# Patient Record
Sex: FEMALE | Race: WHITE | NOT HISPANIC OR LATINO | Employment: OTHER | URBAN - METROPOLITAN AREA
[De-identification: names, ages, dates, MRNs, and addresses within clinical notes are randomized per-mention and may not be internally consistent; named-entity substitution may affect disease eponyms.]

---

## 2017-01-09 ENCOUNTER — OFFICE VISIT (OUTPATIENT)
Dept: FAMILY MEDICINE | Facility: CLINIC | Age: 82
End: 2017-01-09

## 2017-01-09 VITALS
HEART RATE: 59 BPM | HEIGHT: 62 IN | OXYGEN SATURATION: 97 % | SYSTOLIC BLOOD PRESSURE: 120 MMHG | TEMPERATURE: 98.1 F | DIASTOLIC BLOOD PRESSURE: 74 MMHG | WEIGHT: 185 LBS | BODY MASS INDEX: 34.04 KG/M2

## 2017-01-09 DIAGNOSIS — Z01.818 PREOPERATIVE EXAMINATION: Primary | ICD-10-CM

## 2017-01-09 DIAGNOSIS — E11.40 TYPE 2 DIABETES MELLITUS WITH DIABETIC NEUROPATHY, WITHOUT LONG-TERM CURRENT USE OF INSULIN (H): ICD-10-CM

## 2017-01-09 DIAGNOSIS — E53.9 B-COMPLEX DEFICIENCY: ICD-10-CM

## 2017-01-09 DIAGNOSIS — E78.2 MIXED HYPERLIPIDEMIA: ICD-10-CM

## 2017-01-09 LAB
HBA1C MFR BLD: 7.2 % (ref 4–7)
HEMOGLOBIN: 11.3 G/DL (ref 11.7–15.7)

## 2017-01-09 PROCEDURE — 85018 HEMOGLOBIN: CPT | Performed by: FAMILY MEDICINE

## 2017-01-09 PROCEDURE — 99214 OFFICE O/P EST MOD 30 MIN: CPT | Performed by: FAMILY MEDICINE

## 2017-01-09 PROCEDURE — 83036 HEMOGLOBIN GLYCOSYLATED A1C: CPT | Performed by: FAMILY MEDICINE

## 2017-01-09 PROCEDURE — 36415 COLL VENOUS BLD VENIPUNCTURE: CPT | Performed by: FAMILY MEDICINE

## 2017-01-09 PROCEDURE — 93000 ELECTROCARDIOGRAM COMPLETE: CPT | Performed by: FAMILY MEDICINE

## 2017-01-09 NOTE — PROGRESS NOTES
[facility]  [address]  [address]  [facility] [address]  [phone]  Dept: [phone]    PRE-OP EVALUATION:  Today's date: 2017    Trisha Lucia (: 1935) presents for pre-operative evaluation assessment as requested by Dr. Joaquin Skaggs.  She requires evaluation and anesthesia risk assessment prior to undergoing surgery/procedure for treatment of Hernia .  Proposed procedure:ventral Hernia Repair    Date of Surgery/ Procedure: 17  Time of Surgery/ Procedure: 7:45Am  Hospital/Surgical Facility: [facility]  Primary Physician: Judith Walker  Type of Anesthesia Anticipated: General    Patient has a Health Care Directive or Living Will:  YES On File  Noelle Guerrero CMA    1. NO - Do you have a history of heart attack, stroke, stent, bypass or surgery on an artery in the head, neck, heart or legs?  2. NO - Do you ever have any pain or discomfort in your chest?  3. NO - Do you have a history of  Heart Failure?  4. NO - Are you troubled by shortness of breath when: walking on the level, up a slight hill or at night?  5. NO - Do you currently have a cold, bronchitis or other respiratory infection?  6. NO - Do you have a cough, shortness of breath or wheezing?  7. NO - Do you sometimes get pains in the calves of your legs when you walk?  8. NO - Do you or anyone in your family have previous history of blood clots?  9. NO - Do you or does anyone in your family have a serious bleeding problem such as prolonged bleeding following surgeries or cuts?  10. NO - Have you ever had problems with anemia or been told to take iron pills?  11. NO - Have you had any abnormal blood loss such as black, tarry or bloody stools, or abnormal vaginal bleeding?  12. NO - Have you ever had a blood transfusion?  13. NO - Have you or any of your relatives ever had problems with anesthesia?  14. YES - DO YOU HAVE SLEEP APNEA, EXCESSIVE SNORING OR DAYTIME DROWSINESS? Has CPAP  but doesn't use  15. NO - Do you have any prosthetic heart valves?  16. YES - DO YOU HAVE PROSTHETIC JOINTS? knee  17. NO - Is there any chance that you may be pregnant?      HPI:                                                      Brief HPI related to upcoming procedure: ventral hernia      See problem list for active medical problems.  Problems all longstanding and stable, except as noted/documented.  See ROS for pertinent symptoms related to these conditions.                                                                                                  .  DIABETES - Patient has a longstanding history of DiabetesType Type II . Patient is being treated with oral agents and denies significant side effects. Control has been good. Complicating factors include but are not limited to: high cholesterol  and obesity .                                                                                                             .    MEDICAL HISTORY:                                                      Patient Active Problem List    Diagnosis Date Noted     Ventral hernia 07/20/2016     Priority: Medium     Pernicious anemia 05/09/2016     Priority: Medium     Type 2 diabetes mellitus with diabetic neuropathy (H) 02/01/2016     Priority: Medium     Neuropathic pain 02/24/2015     Priority: Medium     Followed by neurology       ACP (advance care planning) 07/15/2014     Priority: Medium     Advance Care Planning 7/6/2016: Receipt of ACP document:  Received: Health Care Directive which was witnessed or notarized on 1/18/11.  Document previously scanned on 4/12/16.  Validation form completed and sent to be scanned.  Code Status needs to be updated to reflect choices in most recent ACP document. A conversation about goals of care is recommended for this patient with creation of POLST if appropriate. Confirmed/documented designated decision maker(s).  Added by Josie Holman   Advance Care Planning Liaison  Advance Care  Planning 7/15/2014 : ACP Review and Resources Provided:  Reviewed chart for advance care plan.  Trisha COPE Rea has no plan or code status on file. Discussed available resources and provided with information. Confirmed code status reflects current choices pending further ACP discussions.  Confirmed/documented designated decision maker(s). See permanent comments section of demographics in clinical tab. Added by Destiny Hardy               Open wound of foot, excluding toe(s) 04/03/2013     Priority: Medium     Problem list name updated by automated process. 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 Provider to review       Health Care Home 09/13/2012     Priority: Medium     State Tier Level:  Tier 2  Status:  n/a  Care Coordinator:    See Letters for [facility] Care Plan           History of small bowel obstruction 06/27/2011     Priority: Medium     6/16/2011  She had a CT of the abdomen in the emergency room which was suggestive of a small-bowel obstruction. Her lactic acid level was elevated. Surgery was consulted. The patient was put on IV fluids and pain medications. After surgical evaluation, she underwent surgery and had a laparotomy with lysis of adhesive band and small bowel resection with primary enteroenterostomy and appendectomy. During her hospital course, the patient had a good recovery.       ANNA (obstructive sleep apnea) 12/27/2010     Priority: Medium     cpap       Corn of toe 05/30/2008     Priority: Medium     Cellulitis and abscess of foot 05/30/2008     Priority: Medium     Mixed hyperlipidemia 02/01/2006     Priority: Medium     Obesity 09/28/2004     Priority: Medium     Problem list name updated by automated process. Provider to review       Irritable bowel syndrome 09/28/2004     Priority: Medium     Mucous polyp of cervix 09/28/2004      Priority: Medium     Essential hypertension, benign 06/19/2003     Priority: Medium     Other specified idiopathic peripheral neuropathy 06/19/2003     Priority: Medium     Hypothyroidism 06/19/2003     Priority: Medium     Problem list name updated by automated process. Provider to review       Osteoarthrosis, unspecified whether generalized or localized, involving lower leg 06/19/2003     Priority: Medium     Problem list name updated by automated process. Provider to review        Past Medical History   Diagnosis Date     Essential hypertension, benign      Other specified idiopathic peripheral neuropathy      Unspecified hypothyroidism      Other abnormal glucose      DIABETES MELLITUS TYPE II-UNCOMPL 10/24/2007     Sleep apnea      Past Surgical History   Procedure Laterality Date     Hc debridment mastoid cavity, simple       L ear     Hc knee scope, diagnostic  1997     Arthroscopy, Knee     Hc knee scope, diagnostic  2001     Arthroscopy, Knee     C eye exam established pt  2007     C mammogram, screening  2005     Hcl pap smear  2005     Hc colonoscopy thru stoma, diagnostic  2001     C dexa, bone density, axial skel  2005     Normal     Knee surgery  2011     right total-Dr. Gorman     Small bowel obstruction  6/2011     Small-bowel obstruction, status post laparotomy with lysis of adhesions, small bowel resection with primary enteroenterostomy and appendectomy     Bunionectomy  4/9/2013     Procedure: BUNIONECTOMY;  RIGHT GREAT CLAWTOE CORRECTION WITH TENDON TRANSFER, ENDOSCOPIC RECESSION OF GASTRONEMIUS, DEBRIDEMENT OF ULCER ON SOLE OF RIGHT FOOT;  Surgeon: Stephon Rae MD;  Location: [facility]     Irrigation and debridement foot, combined  4/9/2013     Procedure: COMBINED IRRIGATION AND DEBRIDEMENT FOOT;  Debridement of sole ulcer right foot;  Surgeon: Stephon Rae MD;  Location: [facility]     Endoscopic recession gastrocnemius (yvonne)  4/9/2013     Procedure: ENDOSCOPIC RECESSION  GASTROCNEMIUS (DONTA);;  Surgeon: Stephon Rae MD;  Location: [facility] repair of gracia,one  2015     Butch     Current Outpatient Prescriptions   Medication Sig Dispense Refill     simvastatin (ZOCOR) 20 MG tablet TAKE 1 TABLET (20 MG) BY MOUTH AT BEDTIME 90 tablet 1     levothyroxine (SYNTHROID, LEVOTHROID) 150 MCG tablet Take 1 tablet (150 mcg) by mouth daily 90 tablet 1     lisinopril (PRINIVIL,ZESTRIL) 20 MG tablet Take 1 tablet (20 mg) by mouth daily 90 tablet 1     metFORMIN (GLUCOPHAGE) 1000 MG tablet TAKE 1 TABLET (1,000 MG) BY MOUTH 2 TIMES DAILY (WITH MEALS) 180 tablet 1     latanoprost (XALATAN) 0.005 % ophthalmic solution   4     gabapentin (NEURONTIN) 300 MG capsule        hyoscyamine (ANASPAZ,LEVSIN) 0.125 MG tablet Take 1 tablet (125 mcg) by mouth 4 times daily (before meals and nightly) 30 tablet 2     Multiple Vitamins-Minerals (SENIOR MULTIVITAMIN PLUS) TABS Take  by mouth.       citalopram (CELEXA) 20 MG tablet Take 20 mg by mouth daily.       aspirin 81 MG EC tablet Take 1 tablet by mouth daily. 90 tablet 3     pregabalin (LYRICA) 150 MG capsule Take 150 mg by mouth 3 times daily.       OXYCONTIN 10 MG 12 hr tablet   0     HYDROcodone-acetaminophen (NORCO) 5-325 MG per tablet 1 tablet daily       OTC products: None, except as noted above    Allergies   Allergen Reactions     Cephalexin Other (See Comments) and Itching     Redness and peeling skin     Penicillins      Childhood rxn of red dots     Percocet [Oxycodone-Acetaminophen]      hallucinations     Shellfish Allergy      Sulfa Drugs       Latex Allergy: NO    Social History   Substance Use Topics     Smoking status: Never Smoker      Smokeless tobacco: Never Used     Alcohol Use: No      Comment: 0     History   Drug Use No       REVIEW OF SYSTEMS:                                                    C: NEGATIVE for fever, chills, change in weight  E/M: NEGATIVE for ear, mouth and throat problems  R: NEGATIVE for  "significant cough or SOB  CV: NEGATIVE for chest pain, palpitations or peripheral edema  : negative for and dysuria  PSYCHIATRIC: NEGATIVE for changes in mood or affect    EXAM:                                                    /74 mmHg  Pulse 59  Temp(Src) 98.1  F (36.7  C) (Oral)  Ht 1.572 m (5' 1.9\")  Wt 83.915 kg (185 lb)  BMI 33.96 kg/m2  SpO2 97%  GENERAL APPEARANCE: healthy, alert and no distress  HENT: ear canals and TM's normal and nose and mouth without ulcers or lesions  RESP: lungs clear to auscultation - no rales, rhonchi or wheezes  CV: regular rate and rhythm, normal S1 S2, no S3 or S4 and no murmur, click or rub   ABDOMEN: soft, nontender, no HSM or masses and bowel sounds normal  NEURO: Normal strength and tone, sensory exam grossly normal, mentation intact and speech normal  PSYCH: mentation appears normal and affect normal/bright    DIAGNOSTICS:                                                      EKG: sinus bradycardia, normal axis, normal intervals, no acute ST/T changes c/w ischemia, no LVH by voltage criteria, unchanged from previous tracings-do not agree with computer read of inferior Q waves  Labs Resulted Today:   Results for orders placed or performed in visit on 01/09/17   CL AFF HEMOGLOBIN (BFP)   Result Value Ref Range    Hemoglobin 11.3 (A) 11.7 - 15.7 g/dL    Narrative    Ran twice to confirm.   Hemoglobin A1c (BFP)   Result Value Ref Range    Hemoglobin A1C 7.2 (A) 4.0 - 7.0 %       Recent Labs   Lab Test  07/20/16   1343  07/20/16   1334  02/01/16   1000  02/01/16   0953   04/06/15   1627   03/20/13   0948   01/19/13   0950   04/21/11   0645  04/20/11   0635   HGB   --    --    --    --    --   12.1   --   12.1   --   12.0   < >   --   9.0*   PLT   --    --    --    --    --    --    --   183   --   238   < >   --    --    INR   --    --    --    --    --    --    --    --    --    --    --   1.51*  1.33*   NA  140   --    --   139   < >   --    < >   --    < >   --   "  < >   --    --    POTASSIUM  4.3   --    --   4.1   < >   --    < >   --    < >   --    < >   --    --    CR  0.61   --    --   0.61   < >   --    < >   --    < >   --    < >  0.64   --    A1C   --   7.1*  6.9   --    < >   --    < >   --    --    --    < >   --    --     < > = values in this interval not displayed.        IMPRESSION:                                                    Reason for surgery/procedure: ventral hernia    The proposed surgical procedure is considered INTERMEDIATE risk.    REVISED CARDIAC RISK INDEX  The patient has the following serious cardiovascular risks for perioperative complications such as (MI, PE, VFib and 3  AV Block):  No serious cardiac risks  INTERPRETATION: 0 risks: Class I (very low risk - 0.4% complication rate)    The patient has the following additional risks for perioperative complications:  No identified additional risks      ICD-10-CM    1. Preoperative examination Z01.818 EKG 12-lead complete w/read - Clinics     CL AFF HEMOGLOBIN (BFP)     Hemoglobin A1c (BFP)     VENOUS COLLECTION   2. Type 2 diabetes mellitus with diabetic neuropathy, without long-term current use of insulin (H) E11.40 EKG 12-lead complete w/read - Clinics     Hemoglobin A1c (BFP)     VENOUS COLLECTION   3. Mixed hyperlipidemia E78.2 EKG 12-lead complete w/read - Clinics       RECOMMENDATIONS:                                                        Obstructive Sleep Apnea (or suspected sleep apnea)  Hospital staff are advised to monitor for sleep related oxygen desaturations due to suspicion of ANNA      --Patient is to take all scheduled medications on the day of surgery EXCEPT for modifications listed below.    Diabetes Medication Use  -----Hold usual  oral diabetic meds (e.g. Metformin, Actos, Glipizide) while NPO.       Anticoagulant or Antiplatelet Medication Use  ASPIRIN: Discontinue ASA 7-10 days prior to procedure to reduce bleeding risk.  It should be resumed post-operatively.         APPROVAL GIVEN to proceed with proposed procedure, without further diagnostic evaluation Pt may take pain meds day of surgery but avoid metformin and lisinopril       Signed Electronically by: Berhane Stevens MD    Copy of this evaluation report is provided to requesting physician.    [facility] Preop Guidelines

## 2017-01-09 NOTE — NURSING NOTE
The following medication was given:     MEDICATION: Vitamin B12  1000mcg  ROUTE: IM  SITE: Arm - Right  DOSE: 1ML  LOT #: [id number]  :  [employer] [place] [employer]  EXPIRATION DATE:  05/01/2018  NDC#: 7660-5702-78    Noelle Guerrero CMA

## 2017-01-10 DIAGNOSIS — E03.9 HYPOTHYROIDISM: ICD-10-CM

## 2017-01-10 DIAGNOSIS — I10 ESSENTIAL HYPERTENSION, BENIGN: Primary | ICD-10-CM

## 2017-01-10 DIAGNOSIS — E03.9 HYPOTHYROIDISM, UNSPECIFIED TYPE: ICD-10-CM

## 2017-01-11 RX ORDER — LISINOPRIL 20 MG/1
TABLET ORAL
Qty: 30 TABLET | Refills: 0 | Status: SHIPPED | OUTPATIENT
Start: 2017-01-11 | End: 2017-02-16

## 2017-01-11 RX ORDER — LEVOTHYROXINE SODIUM 150 UG/1
TABLET ORAL
Qty: 30 TABLET | Refills: 0 | Status: SHIPPED | OUTPATIENT
Start: 2017-01-11 | End: 2017-06-09

## 2017-01-11 NOTE — TELEPHONE ENCOUNTER
Spoke with patient, informed meds were sent, pt is having surgery 1/13 and unsure if she will feel well enough to come in within 30 days.  She will call back to schedule visit for med check once she is recovered from surgery.

## 2017-01-11 NOTE — TELEPHONE ENCOUNTER
Pt is due for 6 mo F/U but insurance wants #[phone]-I will call and let her know she is due for an appt. Last visit was 7/20/16    Pending Prescriptions:                       Disp   Refills    levothyroxine (SYNTHROID/LEVOTHROID) 150 *90 tab*0            Sig: TAKE ONE TABLET BY MOUTH DAILY    lisinopril (PRINIVIL/ZESTRIL) 20 MG table*90 tab*0            Sig: TAKE ONE TABLET BY MOUTH ONE TIME DAILY

## 2017-01-11 NOTE — TELEPHONE ENCOUNTER
JCC, the patient is due for an office visit. I have changed the quantity to #30 and put in a note that the patient is due for an ov.    Pending Prescriptions:                       Disp   Refills    levothyroxine (SYNTHROID/LEVOTHROID) 150 *30 tab*0            Sig: TAKE ONE TABLET BY MOUTH DAILY    lisinopril (PRINIVIL/ZESTRIL) 20 MG table*30 tab*0            Sig: TAKE ONE TABLET BY MOUTH ONE TIME DAILY       Ready to be faxed when Rx is approved.    Please forward to the  so the can call the patient and help them set up an appointment.      Telephone Information:   Mobile [phone]         Thank-You,  Edie

## 2017-01-11 NOTE — TELEPHONE ENCOUNTER
Please let the patient know that a 30 day refill has been sent for their prescription.  They are due for a return fasting office visit within 30 days.  Failure to schedule an appointment may result in no further refills of his/her medication.

## 2017-01-13 ENCOUNTER — APPOINTMENT (OUTPATIENT)
Dept: SURGERY | Facility: PHYSICIAN GROUP | Age: 82
End: 2017-01-13
Payer: COMMERCIAL

## 2017-01-13 ENCOUNTER — ANESTHESIA (OUTPATIENT)
Dept: SURGERY | Facility: CLINIC | Age: 82
End: 2017-01-13
Payer: MEDICARE

## 2017-01-13 ENCOUNTER — ANESTHESIA EVENT (OUTPATIENT)
Dept: SURGERY | Facility: CLINIC | Age: 82
End: 2017-01-13
Payer: MEDICARE

## 2017-01-13 PROBLEM — K43.2 INCISIONAL HERNIA: Status: ACTIVE | Noted: 2017-01-13

## 2017-01-13 PROCEDURE — 25800025 ZZH RX 258: Performed by: ANESTHESIOLOGY

## 2017-01-13 PROCEDURE — 49560 ZZHC REPAIR INCISIONAL HERNIA,REDUCIBLE: CPT | Performed by: SURGERY

## 2017-01-13 PROCEDURE — 25000125 ZZHC RX 250: Performed by: NURSE ANESTHETIST, CERTIFIED REGISTERED

## 2017-01-13 PROCEDURE — 49568 ZZHC REPAIR HERNIA WITH MESH: CPT | Performed by: SURGERY

## 2017-01-13 RX ORDER — PROPOFOL 10 MG/ML
INJECTION, EMULSION INTRAVENOUS PRN
Status: DISCONTINUED | OUTPATIENT
Start: 2017-01-13 | End: 2017-01-13

## 2017-01-13 RX ORDER — GLYCOPYRROLATE 0.2 MG/ML
INJECTION, SOLUTION INTRAMUSCULAR; INTRAVENOUS PRN
Status: DISCONTINUED | OUTPATIENT
Start: 2017-01-13 | End: 2017-01-13

## 2017-01-13 RX ORDER — FENTANYL CITRATE 50 UG/ML
INJECTION, SOLUTION INTRAMUSCULAR; INTRAVENOUS PRN
Status: DISCONTINUED | OUTPATIENT
Start: 2017-01-13 | End: 2017-01-13

## 2017-01-13 RX ORDER — EPHEDRINE SULFATE 50 MG/ML
INJECTION, SOLUTION INTRAVENOUS PRN
Status: DISCONTINUED | OUTPATIENT
Start: 2017-01-13 | End: 2017-01-13

## 2017-01-13 RX ORDER — DEXAMETHASONE SODIUM PHOSPHATE 4 MG/ML
INJECTION, SOLUTION INTRA-ARTICULAR; INTRALESIONAL; INTRAMUSCULAR; INTRAVENOUS; SOFT TISSUE PRN
Status: DISCONTINUED | OUTPATIENT
Start: 2017-01-13 | End: 2017-01-13

## 2017-01-13 RX ORDER — ONDANSETRON 2 MG/ML
INJECTION INTRAMUSCULAR; INTRAVENOUS PRN
Status: DISCONTINUED | OUTPATIENT
Start: 2017-01-13 | End: 2017-01-13

## 2017-01-13 RX ORDER — LIDOCAINE HYDROCHLORIDE 10 MG/ML
INJECTION, SOLUTION INFILTRATION; PERINEURAL PRN
Status: DISCONTINUED | OUTPATIENT
Start: 2017-01-13 | End: 2017-01-13

## 2017-01-13 RX ADMIN — FENTANYL CITRATE 25 MCG: 50 INJECTION, SOLUTION INTRAMUSCULAR; INTRAVENOUS at 09:08

## 2017-01-13 RX ADMIN — SODIUM CHLORIDE, POTASSIUM CHLORIDE, SODIUM LACTATE AND CALCIUM CHLORIDE: 600; 310; 30; 20 INJECTION, SOLUTION INTRAVENOUS at 07:18

## 2017-01-13 RX ADMIN — FENTANYL CITRATE 100 MCG: 50 INJECTION, SOLUTION INTRAMUSCULAR; INTRAVENOUS at 07:54

## 2017-01-13 RX ADMIN — EPHEDRINE SULFATE 5 MG: 50 INJECTION, SOLUTION INTRAMUSCULAR; INTRAVENOUS; SUBCUTANEOUS at 08:06

## 2017-01-13 RX ADMIN — ONDANSETRON 4 MG: 2 INJECTION INTRAMUSCULAR; INTRAVENOUS at 09:08

## 2017-01-13 RX ADMIN — SODIUM CHLORIDE, POTASSIUM CHLORIDE, SODIUM LACTATE AND CALCIUM CHLORIDE: 600; 310; 30; 20 INJECTION, SOLUTION INTRAVENOUS at 08:52

## 2017-01-13 RX ADMIN — DEXAMETHASONE SODIUM PHOSPHATE 4 MG: 4 INJECTION, SOLUTION INTRAMUSCULAR; INTRAVENOUS at 07:54

## 2017-01-13 RX ADMIN — FENTANYL CITRATE 25 MCG: 50 INJECTION, SOLUTION INTRAMUSCULAR; INTRAVENOUS at 09:09

## 2017-01-13 RX ADMIN — PROPOFOL 160 MG: 10 INJECTION, EMULSION INTRAVENOUS at 07:54

## 2017-01-13 RX ADMIN — GLYCOPYRROLATE 0.2 MG: 0.2 INJECTION, SOLUTION INTRAMUSCULAR; INTRAVENOUS at 07:54

## 2017-01-13 RX ADMIN — FENTANYL CITRATE 50 MCG: 50 INJECTION, SOLUTION INTRAMUSCULAR; INTRAVENOUS at 08:32

## 2017-01-13 RX ADMIN — MIDAZOLAM HYDROCHLORIDE 2 MG: 1 INJECTION, SOLUTION INTRAMUSCULAR; INTRAVENOUS at 07:48

## 2017-01-13 RX ADMIN — FENTANYL CITRATE 50 MCG: 50 INJECTION, SOLUTION INTRAMUSCULAR; INTRAVENOUS at 08:30

## 2017-01-13 RX ADMIN — LIDOCAINE HYDROCHLORIDE 50 MG: 10 INJECTION, SOLUTION INFILTRATION; PERINEURAL at 07:54

## 2017-01-13 RX ADMIN — ROCURONIUM BROMIDE 10 MG: 10 INJECTION INTRAVENOUS at 08:35

## 2017-01-13 NOTE — ANESTHESIA CARE TRANSFER NOTE
Patient: Trisha Lucia    HERNIORRHAPHY INCISIONAL (LOCATION) (N/A Update)  Additional InformationProcedure(s):  Incisional Hernia repair with mesh  - Wound Class: I-Clean    Diagnosis: Incisional Hernia   Diagnosis Additional Information: No value filed.    Anesthesia Type:   General     Note:  Airway :LMA and Face Mask  Patient transferred to:PACU  Comments: Spontaneous respsirations. O2 per mask. Spontaneous respsirations. O2 per mask.       Vitals: (Last set prior to Anesthesia Care Transfer)              Electronically Signed By: SUPRIYA Robbins CRNA  January 13, 2017  9:28 AM

## 2017-01-13 NOTE — ANESTHESIA PREPROCEDURE EVALUATION
Anesthesia Evaluation     .        ROS/MED HX    ENT/Pulmonary:     (+)sleep apnea, , . .    Neurologic:  - neg neurologic ROS     Cardiovascular:     (+) hypertension----. : . . . :. .       METS/Exercise Tolerance:     Hematologic:  - neg hematologic  ROS       Musculoskeletal:  - neg musculoskeletal ROS       GI/Hepatic:  - neg GI/hepatic ROS       Renal/Genitourinary:         Endo:     (+) type I DM, thyroid problem hypothyroidism, Obesity, .      Psychiatric:  - neg psychiatric ROS       Infectious Disease:  - neg infectious disease ROS       Malignancy:      - no malignancy   Other:    (+) No chance of pregnancy C-spine cleared: N/A, no H/O Chronic Pain,no other significant disability   - neg other ROS           Physical Exam  Normal systems: cardiovascular, pulmonary and dental    Airway   Mallampati: I  TM distance: >3 FB  Neck ROM: full    Dental     Cardiovascular       Pulmonary                     Anesthesia Plan      History & Physical Review  History and physical reviewed and following examination; no interval change.    ASA Status:  3 .    NPO Status:  > 8 hours    Plan for General with Intravenous induction. Maintenance will be Balanced.    PONV prophylaxis:  Ondansetron (or other 5HT-3) and Dexamethasone or Solumedrol       Postoperative Care  Postoperative pain management:  IV analgesics.      Consents  Anesthetic plan, risks, benefits and alternatives discussed with:  Patient.  Use of blood products discussed: Yes.   Use of blood products discussed with Patient.  Consented to blood products.  .                          .

## 2017-01-13 NOTE — ANESTHESIA POSTPROCEDURE EVALUATION
Patient: Trisha Lucia    HERNIORRHAPHY INCISIONAL (LOCATION) (N/A Update)  Additional InformationProcedure(s):  Incisional Hernia repair with mesh  - Wound Class: I-Clean    Diagnosis:Incisional Hernia   Diagnosis Additional Information: Incisional Hernia      Anesthesia Type:  General    Note:  Anesthesia Post Evaluation    Patient location during evaluation: PACU  Patient participation: Able to fully participate in evaluation  Level of consciousness: awake and alert  Pain management: adequate  Airway patency: patent  Cardiovascular status: acceptable  Respiratory status: acceptable  Hydration status: acceptable  PONV: controlled     Anesthetic complications: None          Last vitals:  Filed Vitals:    01/13/17 1225 01/13/17 1235 01/13/17 1240   BP: 130/98  138/74   Pulse:      Temp:      Resp: 14 10 10   SpO2: 97% 94% 94%       Electronically Signed By: Nikolai Fernández MD  January 13, 2017  1:31 PM

## 2017-01-31 ENCOUNTER — OFFICE VISIT (OUTPATIENT)
Dept: SURGERY | Facility: CLINIC | Age: 82
End: 2017-01-31
Payer: COMMERCIAL

## 2017-01-31 DIAGNOSIS — E11.40 TYPE 2 DIABETES MELLITUS WITH DIABETIC NEUROPATHY, WITHOUT LONG-TERM CURRENT USE OF INSULIN (H): Primary | ICD-10-CM

## 2017-01-31 DIAGNOSIS — Z09 SURGICAL FOLLOWUP VISIT: Primary | ICD-10-CM

## 2017-01-31 PROCEDURE — 99024 POSTOP FOLLOW-UP VISIT: CPT | Performed by: PHYSICIAN ASSISTANT

## 2017-01-31 NOTE — Clinical Note
2017    Re:  Trisha COPE Rea   :  1935      Dear Dr. Walker,    I had the pleasure of seeing Trisha Vaughn today in follow-up after her incisional hernia repair with mesh on 2017 by Dr. Skaggs. The patient tolerated the procedure well. Trisha Vaughn is happy to report that her preoperative symptoms have improved. She is now tolerating a regular diet and having normal bowel movements.  She has returned to her baseline pain med regimen for neuropathy, and denies abdominal pain.    On exam, the patient's incision is healing well without signs of infection. Her abdomen is soft and non-tender.     Trisha Vaughn is recovering well postoperatively. We will be happy to see her in the future as needed. She was encouraged to call us with any questions or concerns.      Sincerely,       Jennie Newman PA-C

## 2017-01-31 NOTE — PROGRESS NOTES
2017    Re:  Trisha COPE Rea   :  1935      Dear Dr. Walker,    I had the pleasure of seeing Trisha Vaughn today in follow-up after her incisional hernia repair with mesh on 2017 by Dr. Skaggs. The patient tolerated the procedure well. Trisha Vaughn is happy to report that her preoperative symptoms have improved. She is now tolerating a regular diet and having normal bowel movements.  She has returned to her baseline pain med regimen for neuropathy, and denies abdominal pain.    On exam, the patient's incision is healing well without signs of infection. Her abdomen is soft and non-tender.     Trisha Vaughn is recovering well postoperatively. We will be happy to see her in the future as needed. She was encouraged to call us with any questions or concerns.      Sincerely,           Jennie Newman PA-C      Please route or send letter to:  Primary Care Provider (PCP)        DANDRE      ROS      Physical Exam

## 2017-02-01 NOTE — TELEPHONE ENCOUNTER
#30 only sent to [facility] pharmacy of Metformin.  Please call to schedule fasting Diabetes check.

## 2017-02-01 NOTE — TELEPHONE ENCOUNTER
Refused Prescriptions:                       Disp   Refills    metFORMIN (GLUCOPHAGE) 1000 MG tablet [Pha*180 ta*0        Sig: TAKE ONE TABLET BY MOUTH TWICE DAILY WITH MEALS  Refused By: JODI ACUÑA  Reason for Refusal: Request already responded to by other means (phone, fax, etc.)  Reason for Refusal Comment: called in 30 today     Frannie  [phone] (home)

## 2017-02-16 ENCOUNTER — OFFICE VISIT (OUTPATIENT)
Dept: FAMILY MEDICINE | Facility: CLINIC | Age: 82
End: 2017-02-16

## 2017-02-16 VITALS
SYSTOLIC BLOOD PRESSURE: 120 MMHG | WEIGHT: 180.2 LBS | HEART RATE: 61 BPM | BODY MASS INDEX: 34.05 KG/M2 | DIASTOLIC BLOOD PRESSURE: 62 MMHG | OXYGEN SATURATION: 96 % | TEMPERATURE: 98 F

## 2017-02-16 DIAGNOSIS — E11.40 TYPE 2 DIABETES MELLITUS WITH DIABETIC NEUROPATHY, WITHOUT LONG-TERM CURRENT USE OF INSULIN (H): ICD-10-CM

## 2017-02-16 DIAGNOSIS — E78.2 MIXED HYPERLIPIDEMIA: ICD-10-CM

## 2017-02-16 DIAGNOSIS — G62.9 PERIPHERAL POLYNEUROPATHY: ICD-10-CM

## 2017-02-16 DIAGNOSIS — I10 ESSENTIAL HYPERTENSION, BENIGN: ICD-10-CM

## 2017-02-16 DIAGNOSIS — E03.9 ACQUIRED HYPOTHYROIDISM: ICD-10-CM

## 2017-02-16 LAB
ALBUMIN URINE MG/G CR: <30 MG/G CREATININE
ALBUMIN URINE MG/SPEC: 30
CREATININE URINE: 100

## 2017-02-16 PROCEDURE — 82043 UR ALBUMIN QUANTITATIVE: CPT | Performed by: FAMILY MEDICINE

## 2017-02-16 PROCEDURE — 82607 VITAMIN B-12: CPT | Mod: 90 | Performed by: FAMILY MEDICINE

## 2017-02-16 PROCEDURE — 99214 OFFICE O/P EST MOD 30 MIN: CPT | Performed by: FAMILY MEDICINE

## 2017-02-16 PROCEDURE — 36415 COLL VENOUS BLD VENIPUNCTURE: CPT | Performed by: FAMILY MEDICINE

## 2017-02-16 RX ORDER — SIMVASTATIN 20 MG
TABLET ORAL
Qty: 90 TABLET | Refills: 1 | Status: SHIPPED | OUTPATIENT
Start: 2017-02-16 | End: 2017-06-12

## 2017-02-16 RX ORDER — LISINOPRIL 20 MG/1
20 TABLET ORAL DAILY
Qty: 90 TABLET | Refills: 1 | Status: SHIPPED | OUTPATIENT
Start: 2017-02-16 | End: 2017-06-12

## 2017-02-16 RX ORDER — CITALOPRAM HYDROBROMIDE 20 MG/1
20 TABLET ORAL DAILY
Qty: 90 TABLET | Refills: 1 | Status: SHIPPED | OUTPATIENT
Start: 2017-02-16 | End: 2017-10-04

## 2017-02-16 NOTE — PROGRESS NOTES
SUBJECTIVE:                                                    Trisha Lucia is a 81 year old female who presents to clinic today for the following health issues:      Diabetes Follow-up      Patient is checking blood sugars: not at all    Diabetic concerns: None     Symptoms of hypoglycemia (low blood sugar): none     Paresthesias (numbness or burning in feet) or sores: Yes norco half a pill in am, and middle of afternoon / oxycontin at bedtime    Under care of Dr Bennett for neuropathy- preceded diagnosis of diabetis- lyrica prescribed by Dr Bennett     Date of last diabetic eye exam: Dr Smith- about early January/ mild glaucoma- no diabetic retniopathy- sees every six months       Amount of exercise or physical activity: sedentary- walks stores- runs errands    Problems taking medications regularly: No    Medication side effects: none  Diet: diabetic    Back on aspirin: after surgery    Other problems:  In need of refill of citalopram-    Citalopram initially prescribed by Dr Lawson- twenty years ago- she can't remember why, but believes it was around menopause-     is on same medication  I recommended a trial of 10 mg (half dose) and if doing well, consider DC after recheck for diabetis in six months.    Hypothyroidism Follow-up      Since last visit, patient describes the following symptoms: Weight stable, no hair loss, no skin changes, no constipation, no loose stools     On same dose of thyroid medication since diagnosed in 1965/ after baby born  Recovering from hernia surgery-     Problem list and histories reviewed & adjusted, as indicated.  Additional history: as documented    Patient Active Problem List   Diagnosis     Essential hypertension, benign     Other specified idiopathic peripheral neuropathy     Hypothyroidism     Osteoarthrosis, unspecified whether generalized or localized, involving lower leg     Obesity     Irritable bowel syndrome     Mucous polyp of cervix     Mixed  hyperlipidemia     Corn of toe     Cellulitis and abscess of foot     ANNA (obstructive sleep apnea)     History of small bowel obstruction     Health Care Home     B-complex deficiency     Open wound of foot, excluding toe(s)     Hallux valgus with bunions     ACP (advance care planning)     Neuropathic pain     Type 2 diabetes mellitus with diabetic neuropathy (H)     Pernicious anemia     Ventral hernia     Incisional hernia     Past Surgical History   Procedure Laterality Date     Hc debridment mastoid cavity, simple       L ear     Hc knee scope, diagnostic  1997     Arthroscopy, Knee     Hc knee scope, diagnostic  2001     Arthroscopy, Knee     C eye exam established pt  2007     C mammogram, screening  2005     Hcl pap smear  2005     Hc colonoscopy thru stoma, diagnostic  2001     C dexa, bone density, axial skel  2005     Normal     Knee surgery  2011     right total-Dr. Gorman     Small bowel obstruction  6/2011     Small-bowel obstruction, status post laparotomy with lysis of adhesions, small bowel resection with primary enteroenterostomy and appendectomy     Bunionectomy  4/9/2013     Procedure: BUNIONECTOMY;  RIGHT GREAT CLAWTOE CORRECTION WITH TENDON TRANSFER, ENDOSCOPIC RECESSION OF GASTRONEMIUS, DEBRIDEMENT OF ULCER ON SOLE OF RIGHT FOOT;  Surgeon: Stephon Rae MD;  Location: [facility]     Irrigation and debridement foot, combined  4/9/2013     Procedure: COMBINED IRRIGATION AND DEBRIDEMENT FOOT;  Debridement of sole ulcer right foot;  Surgeon: Stephon Rae MD;  Location: [facility]     Endoscopic recession gastrocnemius (yvonne)  4/9/2013     Procedure: ENDOSCOPIC RECESSION GASTROCNEMIUS (YVONNE);;  Surgeon: Stephon Rae MD;  Location: [facility] repair of hammertoe,one  2015     [facility]     Herniorrhaphy incisional (location) N/A 1/13/2017     Procedure: HERNIORRHAPHY INCISIONAL (LOCATION);  Surgeon: Joaquin Skaggs MD;  Location:  OR       Social History    Substance Use Topics     Smoking status: Never Smoker     Smokeless tobacco: Never Used     Alcohol use No      Comment: 0     Family History   Problem Relation Age of Onset     C.A.D. Mother      DIABETES No family hx of      Hypertension Mother      Breast Cancer No family hx of      Cancer - colorectal No family hx of          Current Outpatient Prescriptions   Medication Sig Dispense Refill     lisinopril (PRINIVIL/ZESTRIL) 20 MG tablet Take 1 tablet (20 mg) by mouth daily 90 tablet 1     simvastatin (ZOCOR) 20 MG tablet TAKE 1 TABLET (20 MG) BY MOUTH AT BEDTIME 90 tablet 1     citalopram (CELEXA) 20 MG tablet Take 1 tablet (20 mg) by mouth daily 90 tablet 1     metFORMIN (GLUCOPHAGE) 1000 MG tablet TAKE ONE TABLET BY MOUTH TWICE DAILY WITH MEALS 180 tablet 0     VITAMIN D, CHOLECALCIFEROL, PO Take 1,000 Units by mouth daily       Cyanocobalamin (VITAMIN B 12 PO) Take 50 mcg by mouth daily       HYDROcodone-acetaminophen (NORCO) 5-325 MG per tablet Take 1-2 tablets by mouth every 4 hours as needed for other (Moderate to Severe Pain) 30 tablet 0     levothyroxine (SYNTHROID/LEVOTHROID) 150 MCG tablet TAKE ONE TABLET BY MOUTH DAILY 30 tablet 0     OXYCONTIN 10 MG 12 hr tablet Take 10 mg by mouth At Bedtime   0     gabapentin (NEURONTIN) 300 MG capsule Take 300 mg by mouth every evening        hyoscyamine (ANASPAZ,LEVSIN) 0.125 MG tablet Take 1 tablet (125 mcg) by mouth 4 times daily (before meals and nightly) 30 tablet 2     HYDROcodone-acetaminophen (NORCO) 5-325 MG per tablet Take 0.5 tablets by mouth 2 times daily        Multiple Vitamins-Minerals (SENIOR MULTIVITAMIN PLUS) TABS Take 1 tablet by mouth daily        aspirin 81 MG EC tablet Take 1 tablet by mouth daily. 90 tablet 3     pregabalin (LYRICA) 150 MG capsule Take 150 mg by mouth 3 times daily.       [DISCONTINUED] lisinopril (PRINIVIL/ZESTRIL) 20 MG tablet TAKE ONE TABLET BY MOUTH ONE TIME DAILY  30 tablet 0     [DISCONTINUED] simvastatin (ZOCOR) 20  MG tablet TAKE 1 TABLET (20 MG) BY MOUTH AT BEDTIME 90 tablet 1     [DISCONTINUED] citalopram (CELEXA) 20 MG tablet Take 20 mg by mouth daily.         ROS:  C: NEGATIVE for fever, chills, change in weight  E/M: NEGATIVE for ear, mouth and throat problems  R: NEGATIVE for significant cough or SOB  CV: NEGATIVE for chest pain, palpitations or peripheral edema    OBJECTIVE:                                                    /62 (BP Location: Left arm, Patient Position: Chair, Cuff Size: Adult Regular)  Pulse 61  Temp 98  F (36.7  C) (Oral)  Wt 81.7 kg (180 lb 3.2 oz)  SpO2 96%  BMI 34.05 kg/m2  Body mass index is 34.05 kg/(m^2).  Regular rate and  rhythm. S1 and S2 normal, no murmurs, clicks, gallops or rubs. No edema or JVD. Chest is clear; no wheezes or rales.  No edema    Diagnostic Test Results:  Results for orders placed or performed in visit on 02/16/17 (from the past 24 hour(s))   Albumin Random Urine Quantitative   Result Value Ref Range    Albumin Urine mg/spec 30 <30    Albumin Urine mg/g Cr <30 <30 MG/G Creatinine    Creatinine Urine 100 <300        ASSESSMENT/PLAN:                                                      Problem List Items Addressed This Visit     Essential hypertension, benign    Relevant Medications    lisinopril (PRINIVIL/ZESTRIL) 20 MG tablet    Other Relevant Orders    Albumin Random Urine Quantitative (Completed)    Lipid Profile    BASIC METABOLIC PANEL (QUEST)    Hypothyroidism - Primary    Relevant Orders    VENOUS COLLECTION (Completed)    TSH (QUEST)    T4 free    Mixed hyperlipidemia    Relevant Medications    simvastatin (ZOCOR) 20 MG tablet    Other Relevant Orders    Lipid Profile    VENOUS COLLECTION (Completed)    Type 2 diabetes mellitus with diabetic neuropathy (H)      Other Visit Diagnoses     Peripheral polyneuropathy (H)        Relevant Medications    citalopram (CELEXA) 20 MG tablet    Other Relevant Orders    VENOUS COLLECTION (Completed)    VITAMIN B12 (QUEST)     "       BMI:   Estimated body mass index is 34.05 kg/(m^2) as calculated from the following:    Height as of 1/13/17: 1.549 m (5' 1\").    Weight as of this encounter: 81.7 kg (180 lb 3.2 oz).   Weight management plan: Discussed healthy diet and exercise guidelines and patient will follow up in 6 months in clinic to re-evaluate.      MEDICATIONS:        - Decrease Citalopram to 10 mg       - Continue other medications without change  FUTURE APPOINTMENTS:       - Follow-up visit in  6months  Regular exercise    Judith Walker MD  [facility]        "

## 2017-02-16 NOTE — NURSING NOTE
The following medication was given:     MEDICATION: Vitamin B12  58005 mcg  ROUTE: IM  SITE: Deltoid - Right  DOSE: 1 ml  LOT #: [id number]  :  [employer]  EXPIRATION DATE:  07/30/2018  NDC#: 6809-3679-83

## 2017-02-16 NOTE — NURSING NOTE
Trisha is here for a fasting medication recheck.     Pre-Visit Screening :  Immunizations : up to date  Colonoscopy : is up to date  Mammogram : is up to date  Asthma Action Test/Plan : na  PHQ9/GAD7 :  utd    BP done on the left arm, with a regular sized cuff.  Pulse - regular  My Chart - accepts    CLASSIFICATION OF OVERWEIGHT AND OBESITY BY BMI                         Obesity Class           BMI(kg/m2)  Underweight                                    < 18.5  Normal                                         18.5-24.9  Overweight                                     25.0-29.9  OBESITY                     I                  30.0-34.9                              II                 35.0-39.9  EXTREME OBESITY             III                >40                             Patient's  BMI Body mass index is 34.05 kg/(m^2).  http://hin.nhlbi.nih.gov/menuplanner/menu.cgi  Questioned patient about current smoking habits.  Pt. has never smoked.  GISSELLE Herbert ([facility])

## 2017-02-16 NOTE — MR AVS SNAPSHOT
After Visit Summary   2/16/2017    Trisha Lucia    MRN: [id number]           Patient Information     Date Of Birth          1935        Visit Information        Provider Department      2/16/2017 9:45 AM Judith Walker MD [facility]        Today's Diagnoses     Acquired hypothyroidism    -  1    Essential hypertension, benign        Mixed hyperlipidemia        Peripheral polyneuropathy (H)        Type 2 diabetes mellitus with diabetic neuropathy, without long-term current use of insulin (H)          Care Instructions    Try half dose of citalopram/ and see if you can tell a difference        Follow-ups after your visit        Who to contact     If you have questions or need follow up information about today's clinic visit or your schedule please contact [facility] directly at [phone].  Normal or non-critical lab and imaging results will be communicated to you by Play[employer] App[employer], letter or phone within 4 business days after the clinic has received the results. If you do not hear from us within 7 days, please contact the clinic through [employer] App[employer] or phone. If you have a critical or abnormal lab result, we will notify you by phone as soon as possible.  Submit refill requests through [employer] or call your pharmacy and they will forward the refill request to us. Please allow 3 business days for your refill to be completed.          Additional Information About Your Visit        MyChart Information     [employer] gives you secure access to your electronic health record. If you see a primary care provider, you can also send messages to your care team and make appointments. If you have questions, please call your primary care clinic.  If you do not have a primary care provider, please call [phone] and they will assist you.        Care EveryWhere ID     This is your Care EveryWhere ID. This could be used by other organizations to access your [place]  medical records  [id number]        Your Vitals Were     Pulse Temperature Pulse Oximetry BMI (Body Mass Index)          61 98  F (36.7  C) (Oral) 96% 34.05 kg/m2         Blood Pressure from Last 3 Encounters:   02/16/17 120/62   01/14/17 141/58   01/09/17 120/74    Weight from Last 3 Encounters:   02/16/17 81.7 kg (180 lb 3.2 oz)   01/13/17 83.9 kg (185 lb)   01/09/17 83.9 kg (185 lb)              We Performed the Following     Albumin Random Urine Quantitative     BASIC METABOLIC PANEL (QUEST)     Lipid Profile     T4 free     TSH (QUEST)     VENOUS COLLECTION     VITAMIN B12 (QUEST)          Today's Medication Changes          These changes are accurate as of: 2/16/17 10:42 AM.  If you have any questions, ask your nurse or doctor.               These medicines have changed or have updated prescriptions.        Dose/Directions    lisinopril 20 MG tablet   Commonly known as:  PRINIVIL/ZESTRIL   This may have changed:  See the new instructions.   Used for:  Essential hypertension, benign   Changed by:  Judith Walker MD        Dose:  20 mg   Take 1 tablet (20 mg) by mouth daily   Quantity:  90 tablet   Refills:  1            Where to get your medicines      These medications were sent to [id number] [address] [address]     Phone:  [phone]     citalopram 20 MG tablet    lisinopril 20 MG tablet    simvastatin 20 MG tablet                Primary Care Provider Office Phone # Fax #    Judiht Walker [phone]       [facility] [address]  [facility] [address]        Thank you!     Thank you for choosing [facility]  for your care. Our goal is always to provide you with excellent care. Hearing back from our patients is one way we can continue to improve our services. Please take a few minutes to complete the written survey that you may receive in the mail  after your visit with us. Thank you!             Your Updated Medication List - Protect others around you: Learn how to safely use, store and throw away your medicines at www.disposemymeds.org.          This list is accurate as of: 2/16/17 10:42 AM.  Always use your most recent med list.                   Brand Name Dispense Instructions for use    aspirin 81 MG EC tablet     90 tablet    Take 1 tablet by mouth daily.       citalopram 20 MG tablet    celeXA    90 tablet    Take 1 tablet (20 mg) by mouth daily       gabapentin 300 MG capsule    NEURONTIN     Take 300 mg by mouth every evening       * HYDROcodone-acetaminophen 5-325 MG per tablet    NORCO     Take 0.5 tablets by mouth 2 times daily       * HYDROcodone-acetaminophen 5-325 MG per tablet    NORCO    30 tablet    Take 1-2 tablets by mouth every 4 hours as needed for other (Moderate to Severe Pain)       hyoscyamine 0.125 MG tablet    ANASPAZ/LEVSIN    30 tablet    Take 1 tablet (125 mcg) by mouth 4 times daily (before meals and nightly)       levothyroxine 150 MCG tablet    SYNTHROID/LEVOTHROID    30 tablet    TAKE ONE TABLET BY MOUTH DAILY       lisinopril 20 MG tablet    PRINIVIL/ZESTRIL    90 tablet    Take 1 tablet (20 mg) by mouth daily       LYRICA 150 MG capsule   Generic drug:  pregabalin      Take 150 mg by mouth 3 times daily.       metFORMIN 1000 MG tablet    GLUCOPHAGE    180 tablet    TAKE ONE TABLET BY MOUTH TWICE DAILY WITH MEALS       OXYCONTIN 10 MG 12 hr tablet   Generic drug:  oxyCODONE      Take 10 mg by mouth At Bedtime       SENIOR MULTIVITAMIN PLUS Tabs      Take 1 tablet by mouth daily       simvastatin 20 MG tablet    ZOCOR    90 tablet    TAKE 1 TABLET (20 MG) BY MOUTH AT BEDTIME       VITAMIN B 12 PO      Take 50 mcg by mouth daily       VITAMIN D (CHOLECALCIFEROL) PO      Take 1,000 Units by mouth daily       * Notice:  This list has 2 medication(s) that are the same as other medications prescribed for you. Read the directions  carefully, and ask your doctor or other care provider to review them with you.

## 2017-02-17 LAB
BUN SERPL-MCNC: 24 MG/DL (ref 7–25)
BUN/CREATININE RATIO: ABNORMAL (CALC) (ref 6–22)
CALCIUM SERPL-MCNC: 9.3 MG/DL (ref 8.6–10.4)
CHLORIDE SERPLBLD-SCNC: 105 MMOL/L (ref 98–110)
CHOLEST SERPL-MCNC: 139 MG/DL (ref 125–200)
CHOLEST/HDLC SERPL: 3.6 (CALC)
CO2 SERPL-SCNC: 26 MMOL/L (ref 20–31)
CREAT SERPL-MCNC: 0.75 MG/DL (ref 0.6–0.88)
EGFR AFRICAN AMERICAN - QUEST: 87 ML/MIN/1.73M2
GFR SERPL CREATININE-BSD FRML MDRD: 75 ML/MIN/1.73M2
GLUCOSE - QUEST: 111 MG/DL (ref 65–99)
HDLC SERPL-MCNC: 39 MG/DL
LDLC SERPL CALC-MCNC: 84 MG/DL (CALC)
NONHDLC SERPL-MCNC: 100 MG/DL (CALC)
POTASSIUM SERPL-SCNC: 4.6 MMOL/L (ref 3.5–5.3)
SODIUM SERPL-SCNC: 140 MMOL/L (ref 135–146)
T4, FREE, NON-DIALYSIS - QUEST: 1.7 NG/DL (ref 0.8–1.8)
TRIGL SERPL-MCNC: 82 MG/DL
TSH SERPL-ACNC: 0.02 MIU/L (ref 0.4–4.5)
VIT B12 SERPL-MCNC: >2000 PG/ML (ref 200–1100)

## 2017-04-06 ENCOUNTER — ALLIED HEALTH/NURSE VISIT (OUTPATIENT)
Dept: FAMILY MEDICINE | Facility: CLINIC | Age: 82
End: 2017-04-06

## 2017-04-06 DIAGNOSIS — E53.9 B-COMPLEX DEFICIENCY: Primary | ICD-10-CM

## 2017-04-06 PROCEDURE — 96372 THER/PROPH/DIAG INJ SC/IM: CPT | Performed by: FAMILY MEDICINE

## 2017-04-06 NOTE — NURSING NOTE
The following medication was given:     MEDICATION: Vitamin B12  1000mcg  ROUTE: IM  SITE: Deltoid - Right  DOSE: 1 ml  LOT #: [id number]  :  american  EXPIRATION DATE:  06/2018  NDC#: 8027-1090-05

## 2017-04-06 NOTE — MR AVS SNAPSHOT
After Visit Summary   4/6/2017    Trisha Lucia    MRN: [id number]           Patient Information     Date Of Birth          1935        Visit Information        Provider Department      4/6/2017 1:30 PM Judith Walker MD [facility]        Today's Diagnoses     B-complex deficiency    -  1       Follow-ups after your visit        Who to contact     If you have questions or need follow up information about today's clinic visit or your schedule please contact [facility] directly at [phone].  Normal or non-critical lab and imaging results will be communicated to you by [employer], letter or phone within 4 business days after the clinic has received the results. If you do not hear from us within 7 days, please contact the clinic through [employer] or phone. If you have a critical or abnormal lab result, we will notify you by phone as soon as possible.  Submit refill requests through [employer] or call your pharmacy and they will forward the refill request to us. Please allow 3 business days for your refill to be completed.          Additional Information About Your Visit        MyChart Information     [employer] gives you secure access to your electronic health record. If you see a primary care provider, you can also send messages to your care team and make appointments. If you have questions, please call your primary care clinic.  If you do not have a primary care provider, please call [phone] and they will assist you.        Care EveryWhere ID     This is your Care EveryWhere ID. This could be used by other organizations to access your [place] medical records  [id number]         Blood Pressure from Last 3 Encounters:   02/16/17 120/62   01/14/17 141/58   01/09/17 120/74    Weight from Last 3 Encounters:   02/16/17 81.7 kg (180 lb 3.2 oz)   01/13/17 83.9 kg (185 lb)   01/09/17 83.9 kg (185 lb)              We Performed the Following     VITAMIN  B12 INJ /1000G        Primary Care Provider Office Phone # Fax #    Judith Walker [phone]       [facility] [address] [facility] [address]  [facility] [address]        Thank you!     Thank you for choosing [facility]  for your care. Our goal is always to provide you with excellent care. Hearing back from our patients is one way we can continue to improve our services. Please take a few minutes to complete the written survey that you may receive in the mail after your visit with us. Thank you!             Your Updated Medication List - Protect others around you: Learn how to safely use, store and throw away your medicines at www.disposemymeds.org.          This list is accurate as of: 4/6/17 11:59 PM.  Always use your most recent med list.                   Brand Name Dispense Instructions for use    aspirin 81 MG EC tablet     90 tablet    Take 1 tablet by mouth daily.       citalopram 20 MG tablet    celeXA    90 tablet    Take 1 tablet (20 mg) by mouth daily       gabapentin 300 MG capsule    NEURONTIN     Take 300 mg by mouth every evening       * HYDROcodone-acetaminophen 5-325 MG per tablet    NORCO     Take 0.5 tablets by mouth 2 times daily       * HYDROcodone-acetaminophen 5-325 MG per tablet    NORCO    30 tablet    Take 1-2 tablets by mouth every 4 hours as needed for other (Moderate to Severe Pain)       hyoscyamine 0.125 MG tablet    ANASPAZ/LEVSIN    30 tablet    Take 1 tablet (125 mcg) by mouth 4 times daily (before meals and nightly)       * levothyroxine 150 MCG tablet    SYNTHROID/LEVOTHROID    30 tablet    TAKE ONE TABLET BY MOUTH DAILY       * levothyroxine 137 MCG tablet    SYNTHROID/LEVOTHROID    90 tablet    Take 1 tablet (137 mcg) by mouth daily       lisinopril 20 MG tablet    PRINIVIL/ZESTRIL    90 tablet    Take 1 tablet (20 mg) by mouth daily       LYRICA 150 MG capsule   Generic drug:  pregabalin      Take 150 mg by mouth 3  times daily.       metFORMIN 1000 MG tablet    GLUCOPHAGE    180 tablet    TAKE ONE TABLET BY MOUTH TWICE DAILY WITH MEALS       OXYCONTIN 10 MG 12 hr tablet   Generic drug:  oxyCODONE      Take 10 mg by mouth At Bedtime       SENIOR MULTIVITAMIN PLUS Tabs      Take 1 tablet by mouth daily       simvastatin 20 MG tablet    ZOCOR    90 tablet    TAKE 1 TABLET (20 MG) BY MOUTH AT BEDTIME       VITAMIN B 12 PO      Take 50 mcg by mouth daily       VITAMIN D (CHOLECALCIFEROL) PO      Take 1,000 Units by mouth daily       * Notice:  This list has 4 medication(s) that are the same as other medications prescribed for you. Read the directions carefully, and ask your doctor or other care provider to review them with you.

## 2017-04-14 ENCOUNTER — HEALTH MAINTENANCE LETTER (OUTPATIENT)
Age: 82
End: 2017-04-14

## 2017-04-17 ENCOUNTER — TELEPHONE (OUTPATIENT)
Dept: FAMILY MEDICINE | Facility: CLINIC | Age: 82
End: 2017-04-17

## 2017-04-17 NOTE — TELEPHONE ENCOUNTER
Trisha has not had this in the past other than very mild symptoms 1 month ago. Came on Saturday morning. Recommended to meclinizine. Explained allergic vs. Cerumen vs. Infection etiology of inner ear issues causing vertigo where it is helpful to do an exam in person. Explained Apley manuevers to try.

## 2017-04-17 NOTE — TELEPHONE ENCOUNTER
Jane can you please review this for me since BJS is out of the office    Pt called and left a msg that she is currently on her 3rd day of having vertigo. She is getting somewhat better but is not able to get out of bed. 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  Is there any suggestion that we can give her.      Telephone Information:   Mobile [phone]

## 2017-04-25 ENCOUNTER — TELEPHONE (OUTPATIENT)
Dept: FAMILY MEDICINE | Facility: CLINIC | Age: 82
End: 2017-04-25

## 2017-04-25 NOTE — TELEPHONE ENCOUNTER
Patient needs to be evaluated- either here, ent or dizzy and balance clinic-    Because of wait times, may make sense to start at our clinic.  I have seen the patient once. Any provider would be fine

## 2017-04-25 NOTE — TELEPHONE ENCOUNTER
Called Pt and helped her make and appointment with Dr. Stevens for 4/26/17 at 2:30pm.   Pt is going to call one of her daughters to come and drive her here.     GISSELLE Herbert ([facility])

## 2017-04-25 NOTE — TELEPHONE ENCOUNTER
Dr. Walker,     Pt has been dizzy sine 4/14/17.  Pt called the Office and was told to start Meclinizine on 4/17/17.   Pt called today 4/25/17 and states that she is still dizzy.     Do you want her to come in for an O.V or should she be referred to ENT?    Please advise.     Pt can be reached at:  [phone]    GISSELLE Herbert ([facility])

## 2017-04-26 ENCOUNTER — OFFICE VISIT (OUTPATIENT)
Dept: FAMILY MEDICINE | Facility: CLINIC | Age: 82
End: 2017-04-26

## 2017-04-26 VITALS
HEIGHT: 61 IN | BODY MASS INDEX: 33.61 KG/M2 | SYSTOLIC BLOOD PRESSURE: 112 MMHG | TEMPERATURE: 99.4 F | HEART RATE: 72 BPM | OXYGEN SATURATION: 96 % | DIASTOLIC BLOOD PRESSURE: 58 MMHG | WEIGHT: 178 LBS

## 2017-04-26 DIAGNOSIS — H81.10 VERTIGO, BENIGN POSITIONAL, UNSPECIFIED LATERALITY: Primary | ICD-10-CM

## 2017-04-26 PROCEDURE — 99214 OFFICE O/P EST MOD 30 MIN: CPT | Performed by: FAMILY MEDICINE

## 2017-04-26 RX ORDER — MECLIZINE HYDROCHLORIDE 25 MG/1
25 TABLET ORAL EVERY 6 HOURS PRN
Qty: 30 TABLET | Refills: 1 | Status: SHIPPED | OUTPATIENT
Start: 2017-04-26 | End: 2017-08-01

## 2017-04-26 NOTE — NURSING NOTE
Trisha Johana Lucia is here today for Vertigo issues x10 days, no vomiting.    Pre-Visit Screening :  Immunizations : up to date  Colonoscopy : is up to date  Mammogram : is up to date  Asthma Action Test/Plan : NA  PHQ9/GAD7 :  Up to date  Pulse - regular  My Chart - accepts    CLASSIFICATION OF OVERWEIGHT AND OBESITY BY BMI                         Obesity Class           BMI(kg/m2)  Underweight                                    < 18.5  Normal                                         18.5-24.9  Overweight                                     25.0-29.9  OBESITY                     I                  30.0-34.9                              II                 35.0-39.9  EXTREME OBESITY             III                >40                             Patient's  BMI Body mass index is 33.63 kg/(m^2).  http://hin.nhlbi.nih.gov/menuplanner/menu.cgi  Questioned patient about current smoking habits.  Pt. has never smoked.  Noelle Guerrero, CMA

## 2017-04-26 NOTE — PROGRESS NOTES
SUBJECTIVE:                                                    Trisha Lucia is a 81 year old female who presents to clinic today for the following health issues:        Dizziness     Onset: 10 days    Description:   Do you feel faint:  no   Does it feel like the surroundings (bed, room) are moving: YES  Unsteady/off balance: YES, episodes lasts seconds  Have you passed out or fallen: no     Intensity: moderate    Progression of Symptoms:  improving and waxing and waning    Accompanying Signs & Symptoms:  Heart palpitations: YES  Nausea, vomiting: YES  Weakness in arms or legs: no   Fatigue: no   Vision or speech changes: no   Ringing in ears (Tinnitus): no   Hearing Loss: no    History:   Head trauma/concussion hx: no   Previous similar symptoms: YES- mild in past year or so  Recent bleeding history: no     Precipitating factors:   Worse with activity or head movement: YES  Any new medications (BP?): no   Alcohol/drug abuse/withdrawal: no     Alleviating factors:   Does staying in a fixed position give relief:  YES       Therapies Tried and outcome: rest          Problem list and histories reviewed & adjusted, as indicated.  Additional history: as documented    Patient Active Problem List   Diagnosis     Essential hypertension, benign     Other specified idiopathic peripheral neuropathy     Hypothyroidism     Osteoarthrosis, unspecified whether generalized or localized, involving lower leg     Obesity     Irritable bowel syndrome     Mucous polyp of cervix     Mixed hyperlipidemia     Corn of toe     ANNA (obstructive sleep apnea)     History of small bowel obstruction     Health Care Home     B-complex deficiency     Hallux valgus with bunions     ACP (advance care planning)     Neuropathic pain     Type 2 diabetes mellitus with diabetic neuropathy (H)     Pernicious anemia     Ventral hernia     Incisional hernia     Past Surgical History:   Procedure Laterality Date     BUNIONECTOMY  4/9/2013    Procedure:  BUNIONECTOMY;  RIGHT GREAT CLAWTOE CORRECTION WITH TENDON TRANSFER, ENDOSCOPIC RECESSION OF GASTRONEMIUS, DEBRIDEMENT OF ULCER ON SOLE OF RIGHT FOOT;  Surgeon: Stephon Rae MD;  Location: [facility] DEXA, BONE DENSITY, AXIAL SKEL  2005    Normal     C EYE EXAM ESTABLISHED PT  2007     C MAMMOGRAM, SCREENING  2005     ENDOSCOPIC RECESSION GASTROCNEMIUS (DONTA)  4/9/2013    Procedure: ENDOSCOPIC RECESSION GASTROCNEMIUS (DONTA);;  Surgeon: Stephon Rae MD;  Location: [facility] COLONOSCOPY THRU STOMA, DIAGNOSTIC  2001     HC DEBRIDMENT MASTOID CAVITY, SIMPLE      L ear     HC KNEE SCOPE, DIAGNOSTIC  1997    Arthroscopy, Knee     HC KNEE SCOPE, DIAGNOSTIC  2001    Arthroscopy, Knee     HC REPAIR OF HAMMERTOE,ONE  2015    [facility]     HCL PAP SMEAR  2005     HERNIORRHAPHY INCISIONAL (LOCATION) N/A 1/13/2017    Procedure: HERNIORRHAPHY INCISIONAL (LOCATION);  Surgeon: Joaquin Skaggs MD;  Location: [facility]     IRRIGATION AND DEBRIDEMENT FOOT, COMBINED  4/9/2013    Procedure: COMBINED IRRIGATION AND DEBRIDEMENT FOOT;  Debridement of sole ulcer right foot;  Surgeon: Stephon Rae MD;  Location: [facility]     KNEE SURGERY  2011    right total-Dr. Gorman     Small bowel obstruction  6/2011    Small-bowel obstruction, status post laparotomy with lysis of adhesions, small bowel resection with primary enteroenterostomy and appendectomy       Social History   Substance Use Topics     Smoking status: Never Smoker     Smokeless tobacco: Never Used     Alcohol use No      Comment: 0     Family History   Problem Relation Age of Onset     C.A.D. Mother      DIABETES No family hx of      Hypertension Mother      Breast Cancer No family hx of      Cancer - colorectal No family hx of          Current Outpatient Prescriptions   Medication Sig Dispense Refill     levothyroxine (SYNTHROID/LEVOTHROID) 137 MCG tablet Take 1 tablet (137 mcg) by mouth daily 90 tablet 0     lisinopril (PRINIVIL/ZESTRIL) 20  MG tablet Take 1 tablet (20 mg) by mouth daily 90 tablet 1     simvastatin (ZOCOR) 20 MG tablet TAKE 1 TABLET (20 MG) BY MOUTH AT BEDTIME 90 tablet 1     citalopram (CELEXA) 20 MG tablet Take 1 tablet (20 mg) by mouth daily 90 tablet 1     metFORMIN (GLUCOPHAGE) 1000 MG tablet TAKE ONE TABLET BY MOUTH TWICE DAILY WITH MEALS 180 tablet 0     VITAMIN D, CHOLECALCIFEROL, PO Take 1,000 Units by mouth daily       Cyanocobalamin (VITAMIN B 12 PO) Take 50 mcg by mouth daily       HYDROcodone-acetaminophen (NORCO) 5-325 MG per tablet Take 1-2 tablets by mouth every 4 hours as needed for other (Moderate to Severe Pain) 30 tablet 0     levothyroxine (SYNTHROID/LEVOTHROID) 150 MCG tablet TAKE ONE TABLET BY MOUTH DAILY 30 tablet 0     OXYCONTIN 10 MG 12 hr tablet Take 10 mg by mouth At Bedtime   0     gabapentin (NEURONTIN) 300 MG capsule Take 300 mg by mouth every evening        hyoscyamine (ANASPAZ,LEVSIN) 0.125 MG tablet Take 1 tablet (125 mcg) by mouth 4 times daily (before meals and nightly) 30 tablet 2     HYDROcodone-acetaminophen (NORCO) 5-325 MG per tablet Take 0.5 tablets by mouth 2 times daily        Multiple Vitamins-Minerals (SENIOR MULTIVITAMIN PLUS) TABS Take 1 tablet by mouth daily        aspirin 81 MG EC tablet Take 1 tablet by mouth daily. 90 tablet 3     pregabalin (LYRICA) 150 MG capsule Take 150 mg by mouth 3 times daily.       Allergies   Allergen Reactions     Cephalexin Other (See Comments) and Itching     Redness and peeling skin     Penicillins      Childhood rxn of red dots     Percocet [Oxycodone-Acetaminophen]      hallucinations     Shellfish Allergy      Sulfa Drugs      Recent Labs   Lab Test  02/16/17   1104  01/13/17   0655  01/09/17   1100  07/20/16   1343  07/20/16   1334  02/01/16   1000  02/01/16   0953  07/20/15   1010   06/26/11   0615   A1C   --    --   7.2*   --   7.1*  6.9   --    --    < >   --    LDL  84   --    --    --    --    --   67  77   < >   --    HDL  39*   --    --    --     "--    --   43*  41*   < >   --    TRIG  82   --    --    --    --    --   99  97   < >   --    ALT   --    --    --   11   --    --   13  12   < >   --    CR  0.75  0.56   --   0.61   --    --   0.61  0.84   < >  0.43*   GFRESTIMATED  75  >90  Non  GFR Calc     --   86   --    --   86  66   < >  >90   GFRESTBLACK   --   >90   GFR Calc     --    --    --    --    --    --    --   >90   POTASSIUM  4.6  4.1   --   4.3   --    --   4.1  4.1   < >  3.7   TSH  0.02*   --    --    --    --    --   0.01*  0.04*   < >   --     < > = values in this interval not displayed.      BP Readings from Last 3 Encounters:   04/26/17 112/58   02/16/17 120/62   01/14/17 141/58    Wt Readings from Last 3 Encounters:   04/26/17 80.7 kg (178 lb)   02/16/17 81.7 kg (180 lb 3.2 oz)   01/13/17 83.9 kg (185 lb)                    ROS:  Constitutional, HEENT, cardiovascular, pulmonary, gi and gu systems are negative, except as otherwise noted.    OBJECTIVE:                                                    /58 (BP Location: Left arm, Patient Position: Chair, Cuff Size: Adult Large)  Pulse 72  Temp 99.4  F (37.4  C) (Oral)  Ht 1.549 m (5' 1\")  Wt 80.7 kg (178 lb)  SpO2 96%  BMI 33.63 kg/m2  Body mass index is 33.63 kg/(m^2).   GENERAL: healthy, alert and no distress  EYES: Eyes grossly normal to inspection, PERRL and conjunctivae and sclerae normal  HENT: ear canals and TM's normal, nose and mouth without ulcers or lesions  NECK: no adenopathy, no asymmetry, masses, or scars and thyroid normal to palpation  RESP: lungs clear to auscultation - no rales, rhonchi or wheezes  CV: regular rate and rhythm, normal S1 S2, no S3 or S4, no murmur, click or rub, no peripheral edema and peripheral pulses strong  ABDOMEN: soft, nontender, no hepatosplenomegaly, no masses and bowel sounds normal  MS: no gross musculoskeletal defects noted, no edema  SKIN: no suspicious lesions or rashes  NEURO: Normal strength and " "tone, mentation intact and speech normal  NEURO: cranial nerves 2-12 intact  PSYCH: mentation appears normal, affect normal/bright    Diagnostic Test Results:  none      ASSESSMENT:                                                        PLAN:                                                    (H81.10) Vertigo, benign positional, unspecified laterality  (primary encounter diagnosis)  Comment: no central symptoms , appears to have peripheral inner ear issue-discussed diagnosis  Plan: meclizine trial, exercise, rest, ENT if not better in a week, patient given instructions to go to emergency department immediately if worsening of symptoms and verbalizes this understanding     BMI:   Estimated body mass index is 33.63 kg/(m^2) as calculated from the following:    Height as of this encounter: 1.549 m (5' 1\").    Weight as of this encounter: 80.7 kg (178 lb).   Weight management plan: Discussed healthy diet and exercise guidelines and patient will follow up in 12 months in clinic to re-evaluate.      FUTURE APPOINTMENTS:       - Follow-up visit in prn  Work on weight loss  Regular exercise    Berhane Stevens MD  [facility]      "

## 2017-04-26 NOTE — MR AVS SNAPSHOT
"              After Visit Summary   4/26/2017    Trisha Lucia    MRN: [id number]           Patient Information     Date Of Birth          1935        Visit Information        Provider Department      4/26/2017 2:30 PM Berhane Stevens MD [facility]        Today's Diagnoses     Vertigo, benign positional, unspecified laterality    -  1       Follow-ups after your visit        Who to contact     If you have questions or need follow up information about today's clinic visit or your schedule please contact [facility] directly at [phone].  Normal or non-critical lab and imaging results will be communicated to you by MyChart, letter or phone within 4 business days after the clinic has received the results. If you do not hear from us within 7 days, please contact the clinic through [employer] or phone. If you have a critical or abnormal lab result, we will notify you by phone as soon as possible.  Submit refill requests through [employer] or call your pharmacy and they will forward the refill request to us. Please allow 3 business days for your refill to be completed.          Additional Information About Your Visit        MyChart Information     [employer] gives you secure access to your electronic health record. If you see a primary care provider, you can also send messages to your care team and make appointments. If you have questions, please call your primary care clinic.  If you do not have a primary care provider, please call [phone] and they will assist you.        Care EveryWhere ID     This is your Care EveryWhere ID. This could be used by other organizations to access your [facility] medical records  [id number]        Your Vitals Were     Pulse Temperature Height Pulse Oximetry BMI (Body Mass Index)       72 99.4  F (37.4  C) (Oral) 1.549 m (5' 1\") 96% 33.63 kg/m2        Blood Pressure from Last 3 Encounters:   04/26/17 112/58   02/16/17 120/62 "   01/14/17 141/58    Weight from Last 3 Encounters:   04/26/17 80.7 kg (178 lb)   02/16/17 81.7 kg (180 lb 3.2 oz)   01/13/17 83.9 kg (185 lb)              Today, you had the following     No orders found for display         Today's Medication Changes          These changes are accurate as of: 4/26/17  3:07 PM.  If you have any questions, ask your nurse or doctor.               Start taking these medicines.        Dose/Directions    meclizine 25 MG tablet   Commonly known as:  ANTIVERT   Used for:  Vertigo, benign positional, unspecified laterality   Started by:  Berhane Stevens MD        Dose:  25 mg   Take 1 tablet (25 mg) by mouth every 6 hours as needed for dizziness   Quantity:  30 tablet   Refills:  1            Where to get your medicines      These medications were sent to [id number] [place], [place] - [address] [id number]     Phone:  [phone]     meclizine 25 MG tablet                Primary Care Provider Office Phone # Fax #    Judith Walker [phone]       [facility] [address]  [facility] [address]        Thank you!     Thank you for choosing [facility]  for your care. Our goal is always to provide you with excellent care. Hearing back from our patients is one way we can continue to improve our services. Please take a few minutes to complete the written survey that you may receive in the mail after your visit with us. Thank you!             Your Updated Medication List - Protect others around you: Learn how to safely use, store and throw away your medicines at www.disposemymeds.org.          This list is accurate as of: 4/26/17  3:07 PM.  Always use your most recent med list.                   Brand Name Dispense Instructions for use    aspirin 81 MG EC tablet     90 tablet    Take 1 tablet by mouth daily.       citalopram 20 MG tablet    celeXA    90  tablet    Take 1 tablet (20 mg) by mouth daily       gabapentin 300 MG capsule    NEURONTIN     Take 300 mg by mouth every evening       * HYDROcodone-acetaminophen 5-325 MG per tablet    NORCO     Take 0.5 tablets by mouth 2 times daily       * HYDROcodone-acetaminophen 5-325 MG per tablet    NORCO    30 tablet    Take 1-2 tablets by mouth every 4 hours as needed for other (Moderate to Severe Pain)       hyoscyamine 0.125 MG tablet    ANASPAZ/LEVSIN    30 tablet    Take 1 tablet (125 mcg) by mouth 4 times daily (before meals and nightly)       * levothyroxine 150 MCG tablet    SYNTHROID/LEVOTHROID    30 tablet    TAKE ONE TABLET BY MOUTH DAILY       * levothyroxine 137 MCG tablet    SYNTHROID/LEVOTHROID    90 tablet    Take 1 tablet (137 mcg) by mouth daily       lisinopril 20 MG tablet    PRINIVIL/ZESTRIL    90 tablet    Take 1 tablet (20 mg) by mouth daily       LYRICA 150 MG capsule   Generic drug:  pregabalin      Take 150 mg by mouth 3 times daily.       meclizine 25 MG tablet    ANTIVERT    30 tablet    Take 1 tablet (25 mg) by mouth every 6 hours as needed for dizziness       metFORMIN 1000 MG tablet    GLUCOPHAGE    180 tablet    TAKE ONE TABLET BY MOUTH TWICE DAILY WITH MEALS       OXYCONTIN 10 MG 12 hr tablet   Generic drug:  oxyCODONE      Take 10 mg by mouth At Bedtime       SENIOR MULTIVITAMIN PLUS Tabs      Take 1 tablet by mouth daily       simvastatin 20 MG tablet    ZOCOR    90 tablet    TAKE 1 TABLET (20 MG) BY MOUTH AT BEDTIME       VITAMIN B 12 PO      Take 50 mcg by mouth daily       VITAMIN D (CHOLECALCIFEROL) PO      Take 1,000 Units by mouth daily       * Notice:  This list has 4 medication(s) that are the same as other medications prescribed for you. Read the directions carefully, and ask your doctor or other care provider to review them with you.

## 2017-05-05 DIAGNOSIS — E11.9 TYPE 2 DIABETES MELLITUS WITHOUT COMPLICATION, WITHOUT LONG-TERM CURRENT USE OF INSULIN (H): ICD-10-CM

## 2017-05-05 NOTE — TELEPHONE ENCOUNTER
Pending Prescriptions:                       Disp   Refills    metFORMIN (GLUCOPHAGE) 1000 MG tablet [Ph*180 ta*0            Sig: TAKE ONE TABLET BY MOUTH TWICE DAILY WITH MEALS    BJS pt saw you last for med check on 2-.  Per your notes pt is too follow up in six months.   Pt is diabetic, did have blood work done. Last a1c was Jan 2017  Do you want to refill for another 3 months? For 30 days?  Please  Change qty, fax, deny, or send to DIANA Kathleen  [phone] (home) None (work)

## 2017-05-06 NOTE — TELEPHONE ENCOUNTER
Please let the patient know that a  refill has been sent for their prescription.  They are due for a return fasting office visit within 30 days to recheck her diabetis.      Failure to schedule an appointment may result in no further refills of his/her medication.

## 2017-05-18 ENCOUNTER — TRANSFERRED RECORDS (OUTPATIENT)
Dept: FAMILY MEDICINE | Facility: CLINIC | Age: 82
End: 2017-05-18

## 2017-05-24 DIAGNOSIS — E03.9 HYPOTHYROIDISM, UNSPECIFIED TYPE: ICD-10-CM

## 2017-05-24 RX ORDER — LEVOTHYROXINE SODIUM 137 UG/1
TABLET ORAL
Qty: 90 TABLET | Refills: 0 | OUTPATIENT
Start: 2017-05-24

## 2017-05-24 NOTE — TELEPHONE ENCOUNTER
Refused Prescriptions:                       Disp   Refills    levothyroxine (SYNTHROID/LEVOTHROID) 137 M*90 tab*0        Sig: TAKE ONE TABLET BY MOUTH ONE TIME DAILY   Refused By: JODI ACUÑA  Reason for Refusal: OTHER  Reason for Refusal Comment: pt has future appt     Pt has an appt on 5-  E[facility]  [phone] (home) None (work)

## 2017-05-26 ENCOUNTER — ALLIED HEALTH/NURSE VISIT (OUTPATIENT)
Dept: FAMILY MEDICINE | Facility: CLINIC | Age: 82
End: 2017-05-26

## 2017-05-26 DIAGNOSIS — E53.9 B-COMPLEX DEFICIENCY: Primary | ICD-10-CM

## 2017-05-26 PROCEDURE — 96372 THER/PROPH/DIAG INJ SC/IM: CPT | Performed by: FAMILY MEDICINE

## 2017-05-26 NOTE — NURSING NOTE
The following medication was given:     MEDICATION: Vitamin B12  1000 mcg  ROUTE: IM  SITE: Deltoid - Left  DOSE: 1000 ml  LOT #: [id number]  :  [employer]  EXPIRATION DATE:  05/18  NDC#: 0320-8230-06    GISSELLE Herbert ([facility])

## 2017-05-26 NOTE — MR AVS SNAPSHOT
After Visit Summary   5/26/2017    Trisha Lucia    MRN: [id number]           Patient Information     Date Of Birth          1935        Visit Information        Provider Department      5/26/2017 1:30 PM Judith Walker MD [facility]        Today's Diagnoses     B-complex deficiency    -  1       Follow-ups after your visit        Your next 10 appointments already scheduled     Jun 09, 2017 10:45 AM CDT   Office Visit with Judith Walker MD   [facility] ([facility])    [address]  [address]  [facility] [id number] [phone]              Who to contact     If you have questions or need follow up information about today's clinic visit or your schedule please contact [facility] directly at [phone].  Normal or non-critical lab and imaging results will be communicated to you by MyChart, letter or phone within 4 business days after the clinic has received the results. If you do not hear from us within 7 days, please contact the clinic through [employer] or phone. If you have a critical or abnormal lab result, we will notify you by phone as soon as possible.  Submit refill requests through [employer] or call your pharmacy and they will forward the refill request to us. Please allow 3 business days for your refill to be completed.          Additional Information About Your Visit        MyChart Information     [employer] gives you secure access to your electronic health record. If you see a primary care provider, you can also send messages to your care team and make appointments. If you have questions, please call your primary care clinic.  If you do not have a primary care provider, please call [phone] and they will assist you.        Care EveryWhere ID     This is your Care EveryWhere ID. This could be used by other organizations to access your [facility]  records  [id number]         Blood Pressure from Last 3 Encounters:   04/26/17 112/58   02/16/17 120/62   01/14/17 141/58    Weight from Last 3 Encounters:   04/26/17 80.7 kg (178 lb)   02/16/17 81.7 kg (180 lb 3.2 oz)   01/13/17 83.9 kg (185 lb)              We Performed the Following     VITAMIN B12 INJ /1000MCG        Primary Care Provider Office Phone # Fax #    Judith Walker [phone]       [facility] [address]  [facility] [address]        Thank you!     Thank you for choosing [facility]  for your care. Our goal is always to provide you with excellent care. Hearing back from our patients is one way we can continue to improve our services. Please take a few minutes to complete the written survey that you may receive in the mail after your visit with us. Thank you!             Your Updated Medication List - Protect others around you: Learn how to safely use, store and throw away your medicines at www.disposemymeds.org.          This list is accurate as of: 5/26/17  1:46 PM.  Always use your most recent med list.                   Brand Name Dispense Instructions for use    aspirin 81 MG EC tablet     90 tablet    Take 1 tablet by mouth daily.       citalopram 20 MG tablet    celeXA    90 tablet    Take 1 tablet (20 mg) by mouth daily       gabapentin 300 MG capsule    NEURONTIN     Take 300 mg by mouth every evening       * HYDROcodone-acetaminophen 5-325 MG per tablet    NORCO     Take 0.5 tablets by mouth 2 times daily       * HYDROcodone-acetaminophen 5-325 MG per tablet    NORCO    30 tablet    Take 1-2 tablets by mouth every 4 hours as needed for other (Moderate to Severe Pain)       hyoscyamine 0.125 MG tablet    ANASPAZ/LEVSIN    30 tablet    Take 1 tablet (125 mcg) by mouth 4 times daily (before meals and nightly)       * levothyroxine 150 MCG tablet    SYNTHROID/LEVOTHROID    30 tablet    TAKE ONE TABLET BY MOUTH DAILY        * levothyroxine 137 MCG tablet    SYNTHROID/LEVOTHROID    90 tablet    Take 1 tablet (137 mcg) by mouth daily       lisinopril 20 MG tablet    PRINIVIL/ZESTRIL    90 tablet    Take 1 tablet (20 mg) by mouth daily       LYRICA 150 MG capsule   Generic drug:  pregabalin      Take 150 mg by mouth 3 times daily.       meclizine 25 MG tablet    ANTIVERT    30 tablet    Take 1 tablet (25 mg) by mouth every 6 hours as needed for dizziness       metFORMIN 1000 MG tablet    GLUCOPHAGE    180 tablet    TAKE ONE TABLET BY MOUTH TWICE DAILY WITH MEALS       OXYCONTIN 10 MG 12 hr tablet   Generic drug:  oxyCODONE      Take 10 mg by mouth At Bedtime       SENIOR MULTIVITAMIN PLUS Tabs      Take 1 tablet by mouth daily       simvastatin 20 MG tablet    ZOCOR    90 tablet    TAKE 1 TABLET (20 MG) BY MOUTH AT BEDTIME       VITAMIN B 12 PO      Take 50 mcg by mouth daily       VITAMIN D (CHOLECALCIFEROL) PO      Take 1,000 Units by mouth daily       * Notice:  This list has 4 medication(s) that are the same as other medications prescribed for you. Read the directions carefully, and ask your doctor or other care provider to review them with you.

## 2017-05-30 DIAGNOSIS — E03.9 HYPOTHYROIDISM, UNSPECIFIED TYPE: ICD-10-CM

## 2017-05-30 RX ORDER — LEVOTHYROXINE SODIUM 137 UG/1
TABLET ORAL
Qty: 90 TABLET | Refills: 0 | OUTPATIENT
Start: 2017-05-30

## 2017-05-30 NOTE — TELEPHONE ENCOUNTER
Telephone Information:   Mobile [phone]     Called the patient and I will call in 10 days so she will have enough to cover until her appointment with BJS    Call to the pharmacy listed

## 2017-06-09 ENCOUNTER — OFFICE VISIT (OUTPATIENT)
Dept: FAMILY MEDICINE | Facility: CLINIC | Age: 82
End: 2017-06-09

## 2017-06-09 VITALS
SYSTOLIC BLOOD PRESSURE: 118 MMHG | OXYGEN SATURATION: 96 % | DIASTOLIC BLOOD PRESSURE: 70 MMHG | TEMPERATURE: 97.5 F | HEART RATE: 62 BPM

## 2017-06-09 DIAGNOSIS — F41.1 GENERALIZED ANXIETY DISORDER: ICD-10-CM

## 2017-06-09 DIAGNOSIS — E11.40 TYPE 2 DIABETES MELLITUS WITH DIABETIC NEUROPATHY, WITHOUT LONG-TERM CURRENT USE OF INSULIN (H): Primary | ICD-10-CM

## 2017-06-09 DIAGNOSIS — E03.9 HYPOTHYROIDISM, UNSPECIFIED TYPE: ICD-10-CM

## 2017-06-09 DIAGNOSIS — R42 VERTIGO: ICD-10-CM

## 2017-06-09 LAB — HBA1C MFR BLD: 7 % (ref 4–7)

## 2017-06-09 PROCEDURE — 36415 COLL VENOUS BLD VENIPUNCTURE: CPT | Performed by: FAMILY MEDICINE

## 2017-06-09 PROCEDURE — 83036 HEMOGLOBIN GLYCOSYLATED A1C: CPT | Performed by: FAMILY MEDICINE

## 2017-06-09 PROCEDURE — 99214 OFFICE O/P EST MOD 30 MIN: CPT | Performed by: FAMILY MEDICINE

## 2017-06-09 PROCEDURE — 84443 ASSAY THYROID STIM HORMONE: CPT | Mod: 90 | Performed by: FAMILY MEDICINE

## 2017-06-09 PROCEDURE — 84439 ASSAY OF FREE THYROXINE: CPT | Mod: 90 | Performed by: FAMILY MEDICINE

## 2017-06-09 RX ORDER — LEVOTHYROXINE SODIUM 137 UG/1
137 TABLET ORAL DAILY
Qty: 90 TABLET | Refills: 1 | Status: CANCELLED | OUTPATIENT
Start: 2017-06-09

## 2017-06-09 NOTE — NURSING NOTE
Trisha is here for a Diabetic recheck and A1C recheck.     Pre-Visit Screening :  Immunizations : up to date    Colonoscopy : Not needed  Mammogram : Not Needed  Asthma Action Test/Plan : NA  PHQ9/GAD7 :  NA    Pulse - regular  My Chart - accepts    CLASSIFICATION OF OVERWEIGHT AND OBESITY BY BMI                         Obesity Class           BMI(kg/m2)  Underweight                                    < 18.5  Normal                                         18.5-24.9  Overweight                                     25.0-29.9  OBESITY                     I                  30.0-34.9                              II                 35.0-39.9  EXTREME OBESITY             III                >40                             Patient's  BMI There is no height or weight on file to calculate BMI.  http://hin.nhlbi.nih.gov/menuplanner/menu.cgi  Questioned patient about current smoking habits.  Pt. has never smoked.    Jeri.GISSELLE Medina ([facility])

## 2017-06-09 NOTE — PROGRESS NOTES
SUBJECTIVE:                                                    Trisha Lucia is a 81 year old female who presents to clinic today for the following health issues:      Diabetes Follow-up      Patient is checking blood sugars: not checking    Diabetic concerns: None     Symptoms of hypoglycemia (low blood sugar): sometimes late afternoon     Paresthesias (numbness or burning in feet) or sores: Yes / currently wearing a cast boot- under the care of Dr aRe- francesco on right foot     Date of last diabetic eye exam: 5/2017       Amount of exercise or physical activity: does not walk    Problems taking medications regularly: No    Medication side effects: none    Diet: diabetic    Other concerns:  Recheck thyroid function tests after lowering her dose of thyroid    History of generalized anxiety disorder- initial RX for citalopram- many years ago- situational trigger- family problems  She is currently taking one half citalopram/ no breakthrough anxiety symptoms  If anxiety remains controlled- my try to DC      Loosing weight despite a recent change to a lower dose of levothyroxine  Smaller portions    PROBLEMS TO ADD ON...  Neuropathy managed by Dr Bennett- Hydrocodone, lyrica    Five weeks of vertigo symptoms- started April 16- lasted for about five weeks- now resolved    Problem list and histories reviewed & adjusted, as indicated.  Additional history: as documented    Patient Active Problem List   Diagnosis     Essential hypertension, benign     Other specified idiopathic peripheral neuropathy     Hypothyroidism     Osteoarthrosis, unspecified whether generalized or localized, involving lower leg     Obesity     Irritable bowel syndrome     Mucous polyp of cervix     Mixed hyperlipidemia     Corn of toe     ANNA (obstructive sleep apnea)     History of small bowel obstruction     Health Care Home     B-complex deficiency     Hallux valgus with bunions     ACP (advance care planning)     Neuropathic pain      Type 2 diabetes mellitus with diabetic neuropathy (H)     Pernicious anemia     Ventral hernia     Incisional hernia     Past Surgical History:   Procedure Laterality Date     BUNIONECTOMY  4/9/2013    Procedure: BUNIONECTOMY;  RIGHT GREAT CLAWTOE CORRECTION WITH TENDON TRANSFER, ENDOSCOPIC RECESSION OF GASTRONEMIUS, DEBRIDEMENT OF ULCER ON SOLE OF RIGHT FOOT;  Surgeon: Stephon Rae MD;  Location: [facility] DEXA, BONE DENSITY, AXIAL SKEL  2005    Normal     C EYE EXAM ESTABLISHED PT  2007     C MAMMOGRAM, SCREENING  2005     ENDOSCOPIC RECESSION GASTROCNEMIUS (DONTA)  4/9/2013    Procedure: ENDOSCOPIC RECESSION GASTROCNEMIUS (DONTA);;  Surgeon: Stephon Rae MD;  Location: [facility] COLONOSCOPY THRU STOMA, DIAGNOSTIC  2001     HC DEBRIDMENT MASTOID CAVITY, SIMPLE      L ear     HC KNEE SCOPE, DIAGNOSTIC  1997    Arthroscopy, Knee     HC KNEE SCOPE, DIAGNOSTIC  2001    Arthroscopy, Knee     HC REPAIR OF HAMMERTOE,ONE  2015    [facility]     HCL PAP SMEAR  2005     HERNIORRHAPHY INCISIONAL (LOCATION) N/A 1/13/2017    Procedure: HERNIORRHAPHY INCISIONAL (LOCATION);  Surgeon: Joaquin Skaggs MD;  Location: [facility] OR     IRRIGATION AND DEBRIDEMENT FOOT, COMBINED  4/9/2013    Procedure: COMBINED IRRIGATION AND DEBRIDEMENT FOOT;  Debridement of sole ulcer right foot;  Surgeon: Stephon Rae MD;  Location: [facility]     KNEE SURGERY  2011    right total-Dr. Gorman     Small bowel obstruction  6/2011    Small-bowel obstruction, status post laparotomy with lysis of adhesions, small bowel resection with primary enteroenterostomy and appendectomy       Social History   Substance Use Topics     Smoking status: Never Smoker     Smokeless tobacco: Never Used     Alcohol use No      Comment: 0     Family History   Problem Relation Age of Onset     C.A.D. Mother      DIABETES No family hx of      Hypertension Mother      Breast Cancer No family hx of      Cancer - colorectal No family hx of           Current Outpatient Prescriptions   Medication Sig Dispense Refill     metFORMIN (GLUCOPHAGE) 1000 MG tablet TAKE ONE TABLET BY MOUTH TWICE DAILY WITH MEALS 180 tablet 0     meclizine (ANTIVERT) 25 MG tablet Take 1 tablet (25 mg) by mouth every 6 hours as needed for dizziness 30 tablet 1     levothyroxine (SYNTHROID/LEVOTHROID) 137 MCG tablet Take 1 tablet (137 mcg) by mouth daily 90 tablet 0     lisinopril (PRINIVIL/ZESTRIL) 20 MG tablet Take 1 tablet (20 mg) by mouth daily 90 tablet 1     simvastatin (ZOCOR) 20 MG tablet TAKE 1 TABLET (20 MG) BY MOUTH AT BEDTIME 90 tablet 1     citalopram (CELEXA) 20 MG tablet Take 1 tablet (20 mg) by mouth daily 90 tablet 1     VITAMIN D, CHOLECALCIFEROL, PO Take 1,000 Units by mouth daily       Cyanocobalamin (VITAMIN B 12 PO) Take 50 mcg by mouth daily       HYDROcodone-acetaminophen (NORCO) 5-325 MG per tablet Take 1-2 tablets by mouth every 4 hours as needed for other (Moderate to Severe Pain) 30 tablet 0     OXYCONTIN 10 MG 12 hr tablet Take 10 mg by mouth At Bedtime   0     gabapentin (NEURONTIN) 300 MG capsule Take 300 mg by mouth every evening        hyoscyamine (ANASPAZ,LEVSIN) 0.125 MG tablet Take 1 tablet (125 mcg) by mouth 4 times daily (before meals and nightly) 30 tablet 2     HYDROcodone-acetaminophen (NORCO) 5-325 MG per tablet Take 0.5 tablets by mouth 2 times daily        Multiple Vitamins-Minerals (SENIOR MULTIVITAMIN PLUS) TABS Take 1 tablet by mouth daily        aspirin 81 MG EC tablet Take 1 tablet by mouth daily. 90 tablet 3     pregabalin (LYRICA) 150 MG capsule Take 150 mg by mouth 3 times daily.         Reviewed and updated as needed this visit by clinical staff  Tobacco  Allergies  Meds  Problems       Reviewed and updated as needed this visit by Provider       ROS:  Constitutional, HEENT, cardiovascular, pulmonary, gi and gu systems are negative, except as otherwise noted.    OBJECTIVE:                                                    BP  118/70 (BP Location: Right arm, Patient Position: Chair, Cuff Size: Adult Regular)  Pulse 62  Temp 97.5  F (36.4  C) (Oral)  SpO2 96%  Breastfeeding? No  There is no height or weight on file to calculate BMI.  Regular rate and  rhythm. S1 and S2 normal, no murmurs, clicks, gallops or rubs. No edema or JVD. Chest is clear; no wheezes or rales.  Cast was not removed to exam foot    Diagnostic Test Results:  Results for orders placed or performed in visit on 06/09/17   Hemoglobin A1c   Result Value Ref Range    Hemoglobin A1C 7.0 4.0 - 7.0 %        ASSESSMENT/PLAN:                                                      Problem List Items Addressed This Visit     Hypothyroidism    Relevant Orders    TSH ([facility])    T4 free    Type 2 diabetes mellitus with diabetic neuropathy (H) - Primary    Relevant Orders    Hemoglobin A1c (Completed)    VENOUS COLLECTION (Completed)      Other Visit Diagnoses     Vertigo               MEDICATIONS:        - Decrease citalopram to 10 mg- possible DC       - Continue other medications without change  FUTURE APPOINTMENTS:       - Follow-up visit in 6 months  Regular exercise    Judith Walker MD  [facility]

## 2017-06-09 NOTE — MR AVS SNAPSHOT
After Visit Summary   6/9/2017    Trisha Lucia    MRN: [id number]           Patient Information     Date Of Birth          1935        Visit Information        Provider Department      6/9/2017 10:45 AM Judith Walker MD [facility]        Today's Diagnoses     Type 2 diabetes mellitus with diabetic neuropathy, without long-term current use of insulin (H)    -  1    Hypothyroidism, unspecified type        Vertigo        Generalized anxiety disorder           Follow-ups after your visit        Who to contact     If you have questions or need follow up information about today's clinic visit or your schedule please contact [facility] directly at [phone].  Normal or non-critical lab and imaging results will be communicated to you by Accur[employer]Chart, letter or phone within 4 business days after the clinic has received the results. If you do not hear from us within 7 days, please contact the clinic through AccurIChart or phone. If you have a critical or abnormal lab result, we will notify you by phone as soon as possible.  Submit refill requests through [employer] or call your pharmacy and they will forward the refill request to us. Please allow 3 business days for your refill to be completed.          Additional Information About Your Visit        MyChart Information     [employer] gives you secure access to your electronic health record. If you see a primary care provider, you can also send messages to your care team and make appointments. If you have questions, please call your primary care clinic.  If you do not have a primary care provider, please call [phone] and they will assist you.        Care EveryWhere ID     This is your Care EveryWhere ID. This could be used by other organizations to access your [facility] medical records  [id number]        Your Vitals Were     Pulse Temperature Pulse Oximetry Breastfeeding?          62 97.5  F (36.4  C)  (Oral) 96% No         Blood Pressure from Last 3 Encounters:   06/09/17 118/70   04/26/17 112/58   02/16/17 120/62    Weight from Last 3 Encounters:   04/26/17 80.7 kg (178 lb)   02/16/17 81.7 kg (180 lb 3.2 oz)   01/13/17 83.9 kg (185 lb)              We Performed the Following     Hemoglobin A1c     T4 free     TSH ([facility])     VENOUS COLLECTION          Today's Medication Changes          These changes are accurate as of: 6/9/17 11:59 PM.  If you have any questions, ask your nurse or doctor.               These medicines have changed or have updated prescriptions.        Dose/Directions    * levothyroxine 137 MCG tablet   Commonly known as:  SYNTHROID/LEVOTHROID   This may have changed:  Another medication with the same name was added. Make sure you understand how and when to take each.   Used for:  Hypothyroidism, unspecified type   Changed by:  Berhane Stevens MD        Dose:  137 mcg   Take 1 tablet (137 mcg) by mouth daily   Quantity:  90 tablet   Refills:  0       * levothyroxine 125 MCG tablet   Commonly known as:  SYNTHROID/LEVOTHROID   This may have changed:  You were already taking a medication with the same name, and this prescription was added. Make sure you understand how and when to take each.   Used for:  Hypothyroidism, unspecified type   Changed by:  Judith Walker MD        Dose:  125 mcg   Take 1 tablet (125 mcg) by mouth daily   Quantity:  90 tablet   Refills:  1       * Notice:  This list has 2 medication(s) that are the same as other medications prescribed for you. Read the directions carefully, and ask your doctor or other care provider to review them with you.         Where to get your medicines      These medications were sent to [id number] [place], [place] - [address] [place] [address]     Phone:  [phone]     levothyroxine 125 MCG tablet                Primary Care Provider Office Phone # Fax #    Judith Walker MD  [phone]       [facility] [address]  [facility] [address]        Thank you!     Thank you for choosing [facility]  for your care. Our goal is always to provide you with excellent care. Hearing back from our patients is one way we can continue to improve our services. Please take a few minutes to complete the written survey that you may receive in the mail after your visit with us. Thank you!             Your Updated Medication List - Protect others around you: Learn how to safely use, store and throw away your medicines at www.disposemymeds.org.          This list is accurate as of: 6/9/17 11:59 PM.  Always use your most recent med list.                   Brand Name Dispense Instructions for use    aspirin 81 MG EC tablet     90 tablet    Take 1 tablet by mouth daily.       citalopram 20 MG tablet    celeXA    90 tablet    Take 1 tablet (20 mg) by mouth daily       gabapentin 300 MG capsule    NEURONTIN     Take 300 mg by mouth every evening       * HYDROcodone-acetaminophen 5-325 MG per tablet    NORCO     Take 0.5 tablets by mouth 2 times daily       * HYDROcodone-acetaminophen 5-325 MG per tablet    NORCO    30 tablet    Take 1-2 tablets by mouth every 4 hours as needed for other (Moderate to Severe Pain)       hyoscyamine 0.125 MG tablet    ANASPAZ/LEVSIN    30 tablet    Take 1 tablet (125 mcg) by mouth 4 times daily (before meals and nightly)       * levothyroxine 137 MCG tablet    SYNTHROID/LEVOTHROID    90 tablet    Take 1 tablet (137 mcg) by mouth daily       * levothyroxine 125 MCG tablet    SYNTHROID/LEVOTHROID    90 tablet    Take 1 tablet (125 mcg) by mouth daily       LYRICA 150 MG capsule   Generic drug:  pregabalin      Take 150 mg by mouth 3 times daily.       meclizine 25 MG tablet    ANTIVERT    30 tablet    Take 1 tablet (25 mg) by mouth every 6 hours as needed for dizziness       OXYCONTIN 10 MG 12 hr tablet   Generic drug:   oxyCODONE      Take 10 mg by mouth At Bedtime       SENIOR MULTIVITAMIN PLUS Tabs      Take 1 tablet by mouth daily       VITAMIN B 12 PO      Take 50 mcg by mouth daily       VITAMIN D (CHOLECALCIFEROL) PO      Take 1,000 Units by mouth daily       * Notice:  This list has 4 medication(s) that are the same as other medications prescribed for you. Read the directions carefully, and ask your doctor or other care provider to review them with you.

## 2017-06-10 LAB
T4, FREE, NON-DIALYSIS - QUEST: 1.6 NG/DL (ref 0.8–1.8)
TSH SERPL-ACNC: 0.03 MIU/L (ref 0.4–4.5)

## 2017-06-12 DIAGNOSIS — H81.10 VERTIGO, BENIGN POSITIONAL, UNSPECIFIED LATERALITY: ICD-10-CM

## 2017-06-12 DIAGNOSIS — E03.9 HYPOTHYROIDISM, UNSPECIFIED TYPE: ICD-10-CM

## 2017-06-12 DIAGNOSIS — E78.2 MIXED HYPERLIPIDEMIA: ICD-10-CM

## 2017-06-12 DIAGNOSIS — E11.40 TYPE 2 DIABETES MELLITUS WITH DIABETIC NEUROPATHY, WITHOUT LONG-TERM CURRENT USE OF INSULIN (H): ICD-10-CM

## 2017-06-12 DIAGNOSIS — I10 ESSENTIAL HYPERTENSION, BENIGN: ICD-10-CM

## 2017-06-12 DIAGNOSIS — E11.9 TYPE 2 DIABETES MELLITUS WITHOUT COMPLICATION, WITHOUT LONG-TERM CURRENT USE OF INSULIN (H): ICD-10-CM

## 2017-06-12 RX ORDER — MECLIZINE HYDROCHLORIDE 25 MG/1
25 TABLET ORAL EVERY 6 HOURS PRN
Qty: 30 TABLET | Refills: 1 | Status: CANCELLED | OUTPATIENT
Start: 2017-06-12

## 2017-06-12 RX ORDER — LEVOTHYROXINE SODIUM 125 UG/1
125 TABLET ORAL DAILY
Qty: 90 TABLET | Refills: 1 | Status: SHIPPED | OUTPATIENT
Start: 2017-06-12 | End: 2017-11-29

## 2017-06-12 RX ORDER — SIMVASTATIN 20 MG
TABLET ORAL
Qty: 90 TABLET | Refills: 1 | Status: SHIPPED | OUTPATIENT
Start: 2017-06-12 | End: 2017-12-28

## 2017-06-12 RX ORDER — LISINOPRIL 20 MG/1
20 TABLET ORAL DAILY
Qty: 90 TABLET | Refills: 1 | Status: SHIPPED | OUTPATIENT
Start: 2017-06-12 | End: 2017-12-28

## 2017-06-12 RX ORDER — LEVOTHYROXINE SODIUM 137 UG/1
137 TABLET ORAL DAILY
Qty: 90 TABLET | Refills: 0 | Status: CANCELLED | OUTPATIENT
Start: 2017-06-12

## 2017-06-12 NOTE — TELEPHONE ENCOUNTER
Trisha is calling stating she saw you last week and needs her meds reordered.  She said she is out of her levothyroxine and the other 3 can wait, but will need to be filled soon (Citalopram. Lisinopril and Simvastatin)    Patient's phone #[phone]    Pending Prescriptions:                       Disp   Refills    levothyroxine (SYNTHROID/LEVOTHROID) 137 *90 tab*0            Sig: Take 1 tablet (137 mcg) by mouth daily

## 2017-06-12 NOTE — TELEPHONE ENCOUNTER
Dr. Walker,     Pt was last seen on 6/9/17 for diabetes and did have her TSH checked.   Nothing else for blood work was tested.   Pt is out of medication and looking to get her medication fill ASAP.   Pending Prescriptions:                       Disp   Refills    levothyroxine (SYNTHROID/LEVOTHROID) 137 *90 tab*0            Sig: Take 1 tablet (137 mcg) by mouth daily    simvastatin (ZOCOR) 20 MG tablet          90 tab*1            Sig: TAKE 1 TABLET (20 MG) BY MOUTH AT BEDTIME    metFORMIN (GLUCOPHAGE) 1000 MG tablet     180 ta*1            Sig: TAKE ONE TABLET BY MOUTH TWICE DAILY WITH MEALS    meclizine (ANTIVERT) 25 MG tablet         30 tab*1            Sig: Take 1 tablet (25 mg) by mouth every 6 hours as           needed for dizziness    lisinopril (PRINIVIL/ZESTRIL) 20 MG [id number] tab*1            Sig: Take 1 tablet (20 mg) by mouth daily    Do you want to refill all her medication or just give her 30 days for the ones that need lab work done for?    Please advise.   Pharmacy has been selected.    Jeri.GISSELLE Medina ([facility])

## 2017-06-13 NOTE — TELEPHONE ENCOUNTER
Meclizine not refilled as vertigo has resolved.    Levothyroxine dose changed - after thyroid function test results

## 2017-06-16 DIAGNOSIS — H81.10 VERTIGO, BENIGN POSITIONAL, UNSPECIFIED LATERALITY: ICD-10-CM

## 2017-06-16 RX ORDER — MECLIZINE HYDROCHLORIDE 25 MG/1
25 TABLET ORAL EVERY 6 HOURS PRN
Qty: 30 TABLET | Refills: 1 | OUTPATIENT
Start: 2017-06-16

## 2017-06-16 NOTE — TELEPHONE ENCOUNTER
Patient is requesting a refill of the following from Dr. Stevens:  Refused Prescriptions:                       Disp   Refills    meclizine (ANTIVERT) 25 MG tablet          30 tab*1        Sig: Take 1 tablet (25 mg) by mouth every 6 hours as           needed for dizziness  Refused By: DMITRI SALGUERO  Reason for Refusal: Refill not appropriate  Reason for Refusal Comment: Already refused on 06/12/17    Prescription refill request has already been denied by Dr. Walker on 06/12/17 because the Vertigo has resolved so a refill was not appropriate at this time.  Sent denial back to pharmacy.    Dmitri Salguero, [facility]

## 2017-06-26 ENCOUNTER — TRANSFERRED RECORDS (OUTPATIENT)
Dept: FAMILY MEDICINE | Facility: CLINIC | Age: 82
End: 2017-06-26

## 2017-07-12 ENCOUNTER — ALLIED HEALTH/NURSE VISIT (OUTPATIENT)
Dept: FAMILY MEDICINE | Facility: CLINIC | Age: 82
End: 2017-07-12

## 2017-07-12 DIAGNOSIS — E53.9 B-COMPLEX DEFICIENCY: Primary | ICD-10-CM

## 2017-07-12 PROCEDURE — 96372 THER/PROPH/DIAG INJ SC/IM: CPT | Performed by: FAMILY MEDICINE

## 2017-07-12 NOTE — MR AVS SNAPSHOT
After Visit Summary   7/12/2017    Trisha Lucia    MRN: [id number]           Patient Information     Date Of Birth          1935        Visit Information        Provider Department      7/12/2017 2:00 PM Judith Walker MD [facility]        Today's Diagnoses     B-complex deficiency    -  1       Follow-ups after your visit        Your next 10 appointments already scheduled     Jul 12, 2017  2:00 PM CDT   injection with Judith Walker MD   [facility] ([facility])    [address]  [address]  [facility] [id number] [phone]              Who to contact     If you have questions or need follow up information about today's clinic visit or your schedule please contact [facility] directly at [phone].  Normal or non-critical lab and imaging results will be communicated to you by MyChart, letter or phone within 4 business days after the clinic has received the results. If you do not hear from us within 7 days, please contact the clinic through [employer] or phone. If you have a critical or abnormal lab result, we will notify you by phone as soon as possible.  Submit refill requests through [employer] or call your pharmacy and they will forward the refill request to us. Please allow 3 business days for your refill to be completed.          Additional Information About Your Visit        MyChart Information     [employer] gives you secure access to your electronic health record. If you see a primary care provider, you can also send messages to your care team and make appointments. If you have questions, please call your primary care clinic.  If you do not have a primary care provider, please call [phone] and they will assist you.        Care EveryWhere ID     This is your Care EveryWhere ID. This could be used by other organizations to access your [facility] medical records  [id number]          Blood Pressure from Last 3 Encounters:   06/09/17 118/70   04/26/17 112/58   02/16/17 120/62    Weight from Last 3 Encounters:   04/26/17 80.7 kg (178 lb)   02/16/17 81.7 kg (180 lb 3.2 oz)   01/13/17 83.9 kg (185 lb)              We Performed the Following     VITAMIN B12 INJ /1000MCG        Primary Care Provider Office Phone # Fax #    Judith Walker [phone]       [facility] [address]  [facility] [address]        Equal Access to Services     [facility]: Hadii aad ku hadasho Soomaali, waaxda luqadaha, qaybta kaalmada adeegyada, waxsantos zhu hayyun janelle ocampo . So [facility] [phone].    ATENCIÓN: Si habla español, tiene a duarte disposición servicios gratuitos de asistencia lingüística. Llame al [phone].    We comply with applicable federal civil rights laws and [place] laws. We do not discriminate on the basis of race, color, national origin, age, disability sex, sexual orientation or gender identity.            Thank you!     Thank you for choosing [facility]  for your care. Our goal is always to provide you with excellent care. Hearing back from our patients is one way we can continue to improve our services. Please take a few minutes to complete the written survey that you may receive in the mail after your visit with us. Thank you!             Your Updated Medication List - Protect others around you: Learn how to safely use, store and throw away your medicines at www.disposemymeds.org.          This list is accurate as of: 7/12/17  1:53 PM.  Always use your most recent med list.                   Brand Name Dispense Instructions for use Diagnosis    aspirin 81 MG EC tablet     90 tablet    Take 1 tablet by mouth daily.        citalopram 20 MG tablet    celeXA    90 tablet    Take 1 tablet (20 mg) by mouth daily    Peripheral polyneuropathy (H)       gabapentin 300 MG capsule    NEURONTIN     Take 300 mg by  mouth every evening        * HYDROcodone-acetaminophen 5-325 MG per tablet    NORCO     Take 0.5 tablets by mouth 2 times daily        * HYDROcodone-acetaminophen 5-325 MG per tablet    NORCO    30 tablet    Take 1-2 tablets by mouth every 4 hours as needed for other (Moderate to Severe Pain)    Ventral hernia without obstruction or gangrene       hyoscyamine 0.125 MG tablet    ANASPAZ/LEVSIN    30 tablet    Take 1 tablet (125 mcg) by mouth 4 times daily (before meals and nightly)    Irritable bowel syndrome       * levothyroxine 137 MCG tablet    SYNTHROID/LEVOTHROID    90 tablet    Take 1 tablet (137 mcg) by mouth daily    Hypothyroidism, unspecified type       * levothyroxine 125 MCG tablet    SYNTHROID/LEVOTHROID    90 tablet    Take 1 tablet (125 mcg) by mouth daily    Hypothyroidism, unspecified type       lisinopril 20 MG tablet    PRINIVIL/ZESTRIL    90 tablet    Take 1 tablet (20 mg) by mouth daily    Essential hypertension, benign, Type 2 diabetes mellitus with diabetic neuropathy, without long-term current use of insulin (H)       LYRICA 150 MG capsule   Generic drug:  pregabalin      Take 150 mg by mouth 3 times daily.        meclizine 25 MG tablet    ANTIVERT    30 tablet    Take 1 tablet (25 mg) by mouth every 6 hours as needed for dizziness    Vertigo, benign positional, unspecified laterality       metFORMIN 1000 MG tablet    GLUCOPHAGE    180 tablet    TAKE ONE TABLET BY MOUTH TWICE DAILY WITH MEALS    Type 2 diabetes mellitus without complication, without long-term current use of insulin (H)       OXYCONTIN 10 MG 12 hr tablet   Generic drug:  oxyCODONE      Take 10 mg by mouth At Bedtime        SENIOR MULTIVITAMIN PLUS Tabs      Take 1 tablet by mouth daily        simvastatin 20 MG tablet    ZOCOR    90 tablet    TAKE 1 TABLET (20 MG) BY MOUTH AT BEDTIME    Mixed hyperlipidemia, Type 2 diabetes mellitus with diabetic neuropathy, without long-term current use of insulin (H)       VITAMIN B 12 PO       Take 50 mcg by mouth daily        VITAMIN D (CHOLECALCIFEROL) PO      Take 1,000 Units by mouth daily        * Notice:  This list has 4 medication(s) that are the same as other medications prescribed for you. Read the directions carefully, and ask your doctor or other care provider to review them with you.

## 2017-07-12 NOTE — NURSING NOTE
Trsiha Lucia came in for her B12 today.    The following medication was given:     MEDICATION: Vitamin B12  1000mcg  ROUTE: IM  SITE: Deltoid - Right  DOSE: 1ml  LOT #: [id number]  :  [employer] [place]  EXPIRATION DATE:  05/01/2018  NDC#: 7136-3146-51    Noelle Guerrero CMA

## 2017-07-14 DIAGNOSIS — K58.9 IRRITABLE BOWEL SYNDROME, UNSPECIFIED TYPE: ICD-10-CM

## 2017-07-14 RX ORDER — HYOSCYAMINE SULFATE 0.125 MG
125 TABLET ORAL
Qty: 30 TABLET | Refills: 1 | Status: SHIPPED | OUTPATIENT
Start: 2017-07-14 | End: 2022-03-16

## 2017-07-14 NOTE — TELEPHONE ENCOUNTER
Pt last seen 6/2017 Last filled 11/2014    Pending Prescriptions:                       Disp   Refills    hyoscyamine (ANASPAZ/LEVSIN) 0.125 MG tab*30 tab*2            Sig: Take 1 tablet (125 mcg) by mouth 4 times daily           (before meals and nightly)

## 2017-07-14 NOTE — TELEPHONE ENCOUNTER
Gera called the cs line stating that this drug she has been using for 25-30 years very rarely maybe 1 time per month for her IBS.  It works great when she gets a flare-up so she likes to keep it on hand.  Hoping you could please fill today.    Patient's phone #[phone]

## 2017-08-01 DIAGNOSIS — H81.10 VERTIGO, BENIGN POSITIONAL, UNSPECIFIED LATERALITY: ICD-10-CM

## 2017-08-02 RX ORDER — MECLIZINE HYDROCHLORIDE 25 MG/1
TABLET ORAL
Qty: 30 TABLET | Refills: 0 | Status: SHIPPED | OUTPATIENT
Start: 2017-08-02 | End: 2019-02-12

## 2017-08-02 NOTE — TELEPHONE ENCOUNTER
Pending Prescriptions:                       Disp   Refills    meclizine (ANTIVERT) 25 MG tablet [Pharma*30 tab*             Sig: TAKE ONE TABLET BY MOUTH EVERY SIX HOURS AS           NEEDED FOR DIZZINESS    Refilled last 4-/pt had vertigo then.  Do you want to refill? Was refilled by [facility]  Please fax, deny, or send to DIANA Kathleen  [phone] (home)

## 2017-08-02 NOTE — TELEPHONE ENCOUNTER
I have not seen the patient for vertigo-evaluated by Dr Stevens  Recommend forwarding to Dr Stevens

## 2017-08-10 ENCOUNTER — OFFICE VISIT (OUTPATIENT)
Dept: FAMILY MEDICINE | Facility: CLINIC | Age: 82
End: 2017-08-10

## 2017-08-10 VITALS
SYSTOLIC BLOOD PRESSURE: 136 MMHG | WEIGHT: 181 LBS | BODY MASS INDEX: 33.31 KG/M2 | HEIGHT: 62 IN | DIASTOLIC BLOOD PRESSURE: 60 MMHG | OXYGEN SATURATION: 96 % | HEART RATE: 57 BPM | TEMPERATURE: 98.2 F

## 2017-08-10 DIAGNOSIS — M21.961 FOOT DEFORMITY, RIGHT: ICD-10-CM

## 2017-08-10 DIAGNOSIS — Z01.818 PREOPERATIVE EXAMINATION: Primary | ICD-10-CM

## 2017-08-10 DIAGNOSIS — D51.0 PERNICIOUS ANEMIA: ICD-10-CM

## 2017-08-10 DIAGNOSIS — E11.40 TYPE 2 DIABETES MELLITUS WITH DIABETIC NEUROPATHY, WITHOUT LONG-TERM CURRENT USE OF INSULIN (H): ICD-10-CM

## 2017-08-10 PROBLEM — M48.00 SPINAL STENOSIS: Status: ACTIVE | Noted: 2017-08-10

## 2017-08-10 PROBLEM — K43.2 INCISIONAL HERNIA: Status: RESOLVED | Noted: 2017-01-13 | Resolved: 2017-08-10

## 2017-08-10 LAB
ERYTHROCYTE [DISTWIDTH] IN BLOOD BY AUTOMATED COUNT: 11.7 %
HCT VFR BLD AUTO: 35.7 % (ref 35–47)
HEMOGLOBIN: 11.6 G/DL (ref 11.7–15.7)
MCH RBC QN AUTO: 29.7 PG (ref 26–33)
MCHC RBC AUTO-ENTMCNC: 32.5 G/DL (ref 31–36)
MCV RBC AUTO: 91.6 FL (ref 78–100)
PLATELET COUNT - QUEST: 247 10^9/L (ref 150–375)
RBC # BLD AUTO: 3.9 10*12/L (ref 3.8–5.2)
WBC # BLD AUTO: 4.9 10*9/L (ref 4–11)

## 2017-08-10 PROCEDURE — 82607 VITAMIN B-12: CPT | Mod: 90 | Performed by: FAMILY MEDICINE

## 2017-08-10 PROCEDURE — 80048 BASIC METABOLIC PNL TOTAL CA: CPT | Mod: 90 | Performed by: FAMILY MEDICINE

## 2017-08-10 PROCEDURE — 93000 ELECTROCARDIOGRAM COMPLETE: CPT | Performed by: FAMILY MEDICINE

## 2017-08-10 PROCEDURE — 99214 OFFICE O/P EST MOD 30 MIN: CPT | Performed by: FAMILY MEDICINE

## 2017-08-10 PROCEDURE — 85027 COMPLETE CBC AUTOMATED: CPT | Performed by: FAMILY MEDICINE

## 2017-08-10 PROCEDURE — 36415 COLL VENOUS BLD VENIPUNCTURE: CPT | Performed by: FAMILY MEDICINE

## 2017-08-10 NOTE — PROGRESS NOTES
[facility]  [address]  [address]  [facility] [address]  [phone]  Dept: [phone]    PRE-OP EVALUATION:  Today's date: 8/10/2017    Trisha Lucia (: 1935) presents for pre-operative evaluation assessment as requested by Dr. Rae.  She requires evaluation and anesthesia risk assessment prior to undergoing surgery/procedure for treatment of Right Foot Replace Cushion on the bottom of first digit and bunion  .  Proposed procedure: Right Foot Replace Cushion on the bottom of the first digit and bunion.    Date of Surgery/ Procedure: 17  Time of Surgery/ Procedure: 10:30 a.m  Hospital/Surgical Facility: [facility]  Fax number for surgical facility: [phone]  Primary Physician: Judith Walker  Type of Anesthesia Anticipated: to be determined    Patient has a Health Care Directive or Living Will:  YES Duane Koski    1. NO - Do you have a history of heart attack, stroke, stent, bypass or surgery on an artery in the head, neck, heart or legs?  2. NO - Do you ever have any pain or discomfort in your chest?  3. NO - Do you have a history of  Heart Failure?  4. NO - Are you troubled by shortness of breath when: walking on the level, up a slight hill or at night?  5. NO - Do you currently have a cold, bronchitis or other respiratory infection?  6. NO - Do you have a cough, shortness of breath or wheezing?  7. NO - Do you sometimes get pains in the calves of your legs when you walk?  8. NO - Do you or anyone in your family have previous history of blood clots?  9. NO - Do you or does anyone in your family have a serious bleeding problem such as prolonged bleeding following surgeries or cuts?  10. NO - Have you ever had problems with anemia or been told to take iron pills?  11. NO - Have you had any abnormal blood loss such as black, tarry or bloody stools, or abnormal vaginal bleeding?  12. NO - Have you ever had a blood transfusion?  13.  NO - Have you or any of your relatives ever had problems with anesthesia?  14. YES - Do you have sleep apnea, excessive snoring or daytime drowsiness? SLEEP APNEA- has not worn for three years  15. NO - Do you have any prosthetic heart valves?  16. YES - Do you have prosthetic joints? RIGHT ANKLE, RIGHT KNEE  17. NO - Is there any chance that you may be pregnant?        HPI:                                                      Brief HPI related to upcoming procedure: multiple blisters from pressure on plantar surface of her foot      MEDICAL HISTORY:                                                    Patient Active Problem List    Diagnosis Date Noted     Spinal stenosis 08/10/2017     Priority: Medium     Pernicious anemia 05/09/2016     Priority: Medium     Type 2 diabetes mellitus with diabetic neuropathy (H) 02/01/2016     Priority: Medium     ACP (advance care planning) 07/15/2014     Priority: Medium     Advance Care Planning 7/6/2016: Receipt of ACP document:  Received: Health Care Directive which was witnessed or notarized on 1/18/11.  Document previously scanned on 4/12/16.  Validation form completed and sent to be scanned.  Code Status needs to be updated to reflect choices in most recent ACP document. A conversation about goals of care is recommended for this patient with creation of POLST if appropriate. Confirmed/documented designated decision maker(s).  Added by Josie Holman   Advance Care Planning Liaison  Advance Care Planning 7/15/2014 : ACP Review and Resources Provided:  Reviewed chart for advance care plan.  Trisha Lucia has no plan or code status on file. Discussed available resources and provided with information. Confirmed code status reflects current choices pending further ACP discussions.  Confirmed/documented designated decision maker(s). See permanent comments section of demographics in clinical tab. 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 Provider to review       Health Care Home 09/13/2012     Priority: Medium     State Tier Level:  Tier 2  Status:  n/a  Care Coordinator:    See Letters for [facility] Care Plan           ANNA (obstructive sleep apnea) 12/27/2010     Priority: Medium     cpap       Mixed hyperlipidemia 02/01/2006     Priority: Medium     Obesity 09/28/2004     Priority: Medium     Problem list name updated by automated process. Provider to review       Irritable bowel syndrome 09/28/2004     Priority: Medium     Essential hypertension, benign 06/19/2003     Priority: Medium     Other specified idiopathic peripheral neuropathy 06/19/2003     Priority: Medium     Hypothyroidism 06/19/2003     Priority: Medium     Problem list name updated by automated process. Provider to review       Osteoarthrosis, unspecified whether generalized or localized, involving lower leg 06/19/2003     Priority: Medium     Problem list name updated by automated process. Provider to review        Past Medical History:   Diagnosis Date     Cellulitis and abscess of foot 5/30/2008     DIABETES MELLITUS TYPE II-UNCOMPL 10/24/2007     Essential hypertension, benign      History of small bowel obstruction 6/27/2011 6/16/2011 She had a CT of the abdomen in the emergency room which was suggestive of a small-bowel obstruction. Her lactic acid level was elevated. Surgery was consulted. The patient was put on IV fluids and pain medications. After surgical evaluation, she underwent surgery and had a laparotomy with lysis of adhesive band and small bowel resection with primary enteroenterostomy and appendectomy. During her hospital course, the patient had a good recovery.      Mucous polyp of cervix 9/28/2004     Open wound of foot excluding toes 4/3/2013     Problem list name updated by automated process. Provider to review     Other  abnormal glucose      Other specified idiopathic peripheral neuropathy      Sleep apnea      Unspecified hypothyroidism      Past Surgical History:   Procedure Laterality Date     BUNIONECTOMY  4/9/2013    Procedure: BUNIONECTOMY;  RIGHT GREAT CLAWTOE CORRECTION WITH TENDON TRANSFER, ENDOSCOPIC RECESSION OF GASTRONEMIUS, DEBRIDEMENT OF ULCER ON SOLE OF RIGHT FOOT;  Surgeon: Stephon Rae MD;  Location: [facility] DEXA, BONE DENSITY, AXIAL SKEL  2005    Normal     C EYE EXAM ESTABLISHED PT  2007     C MAMMOGRAM, SCREENING  2005     ENDOSCOPIC RECESSION GASTROCNEMIUS (DONTA)  4/9/2013    Procedure: ENDOSCOPIC RECESSION GASTROCNEMIUS (DONTA);;  Surgeon: Stephon Rae MD;  Location: [facility] COLONOSCOPY THRU STOMA, DIAGNOSTIC  2001     HC DEBRIDMENT MASTOID CAVITY, SIMPLE      L ear     HC KNEE SCOPE, DIAGNOSTIC  1997    Arthroscopy, Knee     HC KNEE SCOPE, DIAGNOSTIC  2001    Arthroscopy, Knee     HC REPAIR OF HAMMERTOE,ONE  2015    [facility]     HCL PAP SMEAR  2005     HERNIORRHAPHY INCISIONAL (LOCATION) N/A 1/13/2017    Procedure: HERNIORRHAPHY INCISIONAL (LOCATION);  Surgeon: Joaquin Skaggs MD;  Location: [facility] OR     IRRIGATION AND DEBRIDEMENT FOOT, COMBINED  4/9/2013    Procedure: COMBINED IRRIGATION AND DEBRIDEMENT FOOT;  Debridement of sole ulcer right foot;  Surgeon: Stephon Rae MD;  Location: [facility]     KNEE SURGERY  2011    right total-Dr. Gorman     Small bowel obstruction  6/2011    Small-bowel obstruction, status post laparotomy with lysis of adhesions, small bowel resection with primary enteroenterostomy and appendectomy     Current Outpatient Prescriptions   Medication Sig Dispense Refill     meclizine (ANTIVERT) 25 MG tablet TAKE ONE TABLET BY MOUTH EVERY SIX HOURS AS NEEDED FOR DIZZINESS 30 tablet 0     hyoscyamine (ANASPAZ/LEVSIN) 0.125 MG tablet Take 1 tablet (125 mcg) by mouth 4 times daily (before meals and nightly) 30 tablet 1     simvastatin (ZOCOR) 20  MG tablet TAKE 1 TABLET (20 MG) BY MOUTH AT BEDTIME 90 tablet 1     metFORMIN (GLUCOPHAGE) 1000 MG tablet TAKE ONE TABLET BY MOUTH TWICE DAILY WITH MEALS 180 tablet 1     lisinopril (PRINIVIL/ZESTRIL) 20 MG tablet Take 1 tablet (20 mg) by mouth daily 90 tablet 1     levothyroxine (SYNTHROID/LEVOTHROID) 125 MCG tablet Take 1 tablet (125 mcg) by mouth daily 90 tablet 1     citalopram (CELEXA) 20 MG tablet Take 1 tablet (20 mg) by mouth daily 90 tablet 1     VITAMIN D, CHOLECALCIFEROL, PO Take 1,000 Units by mouth daily       Cyanocobalamin (VITAMIN B 12 PO) Take 50 mcg by mouth daily       HYDROcodone-acetaminophen (NORCO) 5-325 MG per tablet Take 1-2 tablets by mouth every 4 hours as needed for other (Moderate to Severe Pain) 30 tablet 0     OXYCONTIN 10 MG 12 hr tablet Take 10 mg by mouth At Bedtime   0     gabapentin (NEURONTIN) 300 MG capsule Take 300 mg by mouth every evening        HYDROcodone-acetaminophen (NORCO) 5-325 MG per tablet Take 0.5 tablets by mouth 2 times daily        Multiple Vitamins-Minerals (SENIOR MULTIVITAMIN PLUS) TABS Take 1 tablet by mouth daily        aspirin 81 MG EC tablet Take 1 tablet by mouth daily. 90 tablet 3     pregabalin (LYRICA) 150 MG capsule Take 150 mg by mouth 3 times daily.       [DISCONTINUED] levothyroxine (SYNTHROID/LEVOTHROID) 137 MCG tablet Take 1 tablet (137 mcg) by mouth daily 90 tablet 0     OTC products: no recent use of  Steroids  Stopped aspirin two weeks ago  Took a couple of  mg advil 8/9/2017    Allergies   Allergen Reactions     Cephalexin Other (See Comments) and Itching     Redness and peeling skin     Penicillins      Childhood rxn of red dots     Percocet [Oxycodone-Acetaminophen]      hallucinations     Shellfish Allergy      Sulfa Drugs       Latex Allergy: NO    Social History   Substance Use Topics     Smoking status: Never Smoker     Smokeless tobacco: Never Used     Alcohol use No      Comment: 0     History   Drug Use No       REVIEW OF  "SYSTEMS:                                                    C: NEGATIVE for fever, chills, change in weight  E/M: NEGATIVE for ear, mouth and throat problems  R: NEGATIVE for significant cough or SOB  CV: NEGATIVE for chest pain, palpitations or peripheral edema    EXAM:                                                    /60 (BP Location: Left arm, Patient Position: Chair, Cuff Size: Adult Regular)  Pulse 57  Temp 98.2  F (36.8  C) (Oral)  Ht 1.562 m (5' 1.5\")  Wt 82.1 kg (181 lb)  SpO2 96%  Breastfeeding? No  BMI 33.65 kg/m2  GENERAL APPEARANCE: healthy, alert and no distress  HENT: ear canals and TM's normal and nose and mouth without ulcers or lesions  RESP: lungs clear to auscultation - no rales, rhonchi or wheezes  CV: regular rate and rhythm, normal S1 S2, no S3 or S4 and no murmur, click or rub   ABDOMEN: soft, nontender, no HSM or masses and bowel sounds normal  NEURO: Normal strength and tone, mentation intact and speech normal    DIAGNOSTICS:                                                    EKG: sinus bradycardia  / HR 56    Hemoglobin   Date Value Ref Range Status   08/10/2017 11.6 (A) 11.7 - 15.7 g/dL Final   ]  Potassium   Date Value Ref Range Status   08/10/2017 4.3 3.5 - 5.3 mmol/L Final   ]      Recent Labs   Lab Test  06/09/17   1128  02/16/17   1104  01/13/17   0655  01/09/17   1100  01/09/17   1059  07/20/16   1343   04/06/15   1627   03/20/13   0948   01/19/13   0950   04/21/11   0645  04/20/11   0635   HGB   --    --    --    --   11.3*   --    --   12.1   --   12.1   --   12.0   < >   --   9.0*   PLT   --    --    --    --    --    --    --    --    --   183   --   238   < >   --    --    INR   --    --    --    --    --    --    --    --    --    --    --    --    --   1.51*  1.33*   NA   --   140   --    --    --   140   < >   --    < >   --    < >   --    < >   --    --    POTASSIUM   --   4.6  4.1   --    --   4.3   < >   --    < >   --    < >   --    < >   --    --    CR   " --   0.75  0.56   --    --   0.61   < >   --    < >   --    < >   --    < >  0.64   --    A1C  7.0   --    --   7.2*   --    --    < >   --    < >   --    --    --    < >   --    --     < > = values in this interval not displayed.        IMPRESSION:                                                    Reason for surgery/procedure: diabetic with neuropathy- deformity, plantar surface  The proposed surgical procedure is considered INTERMEDIATE risk.    REVISED CARDIAC RISK INDEX  The patient has the following serious cardiovascular risks for perioperative complications such as (MI, PE, VFib and 3  AV Block):  No serious cardiac risks  INTERPRETATION: 1 risks: Class II (low risk - 0.9% complication rate)- diabetis    The patient has the following additional risks for perioperative complications:  Sleep Apnea  Type 2 diabetis    ICD-10-CM    1. Preoperative examination Z01.818 HEMOGRAM/PLATELET (BFP)     BASIC METABOLIC PANEL ([facility])     VENOUS COLLECTION     EKG 12-lead complete w/read - Clinics   2. Foot deformity, right M21.961    3. Type 2 diabetes mellitus with diabetic neuropathy, without long-term current use of insulin (H) E11.40    4. Pernicious anemia D51.0 VITAMIN B12 INJ /1000MCG       RECOMMENDATIONS:                                                        --Patient is to take all scheduled medications on the day of surgery EXCEPT for modifications listed below.    Lisinopril  Metformin    APPROVAL GIVEN to proceed with proposed procedure, without further diagnostic evaluation (pending potassium)    Patient has been advised to bring her CPAP machine to her surgery       Signed Electronically by: Judith Walker MD    Copy of this evaluation report is provided to requesting physician.    [facility] Preop Guidelines

## 2017-08-10 NOTE — NURSING NOTE
The following medication was given: B12 injection    MEDICATION: Vitamin B12  1000 mcg  ROUTE: IM  SITE: Deltoid - Left  DOSE: 1000 mcg  LOT #: [id number]  :  [employer]  EXPIRATION DATE:  8/1/18  NDC: 5931-1658-04    GISSELLE Herbert ([facility])

## 2017-08-10 NOTE — PATIENT INSTRUCTIONS
DO NOT TAKE LISINOPRIL OR METFORMIN TOMORROW MORNING    Restart metformin when eating normally again

## 2017-08-10 NOTE — MR AVS SNAPSHOT
After Visit Summary   8/10/2017    Trisha Lucia    MRN: [id number]           Patient Information     Date Of Birth          1935        Visit Information        Provider Department      8/10/2017 12:15 PM Judith Walker MD [facility]        Today's Diagnoses     Preoperative examination    -  1    B-complex deficiency          Care Instructions    DO NOT TAKE LISINOPRIL OR METFORMIN TOMORROW MORNING    Restart metformin when eating normally again          Follow-ups after your visit        Who to contact     If you have questions or need follow up information about today's clinic visit or your schedule please contact [facility] directly at [phone].  Normal or non-critical lab and imaging results will be communicated to you by [employer], letter or phone within 4 business days after the clinic has received the results. If you do not hear from us within 7 days, please contact the clinic through [employer] or phone. If you have a critical or abnormal lab result, we will notify you by phone as soon as possible.  Submit refill requests through [employer] or call your pharmacy and they will forward the refill request to us. Please allow 3 business days for your refill to be completed.          Additional Information About Your Visit        MyChart Information     [employer] gives you secure access to your electronic health record. If you see a primary care provider, you can also send messages to your care team and make appointments. If you have questions, please call your primary care clinic.  If you do not have a primary care provider, please call [phone] and they will assist you.        Care EveryWhere ID     This is your Care EveryWhere ID. This could be used by other organizations to access your [facility] medical records  [id number]        Your Vitals Were     Pulse Temperature Height Pulse Oximetry Breastfeeding? BMI (Body Mass Index)    57  "98.2  F (36.8  C) (Oral) 1.562 m (5' 1.5\") 96% No 33.65 kg/m2       Blood Pressure from Last 3 Encounters:   08/10/17 136/60   06/09/17 118/70   04/26/17 112/58    Weight from Last 3 Encounters:   08/10/17 82.1 kg (181 lb)   04/26/17 80.7 kg (178 lb)   02/16/17 81.7 kg (180 lb 3.2 oz)              We Performed the Following     BASIC METABOLIC PANEL ([facility])     EKG 12-lead complete w/read - Clinics     HEMOGRAM/PLATELET (BFP)     VENOUS COLLECTION     VITAMIN B12 ([facility])        Primary Care Provider Office Phone # Fax #    Judith Walker [phone] [address] [address]        Equal Access to Services     [facility]: Hadii sen howe hadasho Soomaali, waaxda luqadaha, qaybta kaalmada adeegyada, raul zhu haycristina ocampo . So [facility] [phone].    ATENCIÓN: Si habla español, tiene a duarte disposición servicios gratuitos de asistencia lingüística. Alee al [phone].    We comply with applicable federal civil rights laws and [place] laws. We do not discriminate on the basis of race, color, national origin, age, disability sex, sexual orientation or gender identity.            Thank you!     Thank you for choosing [facility]  for your care. Our goal is always to provide you with excellent care. Hearing back from our patients is one way we can continue to improve our services. Please take a few minutes to complete the written survey that you may receive in the mail after your visit with us. Thank you!             Your Updated Medication List - Protect others around you: Learn how to safely use, store and throw away your medicines at www.disposemymeds.org.          This list is accurate as of: 8/10/17  1:37 PM.  Always use your most recent med list.                   Brand Name Dispense Instructions for use Diagnosis    aspirin 81 MG EC tablet     90 tablet    Take 1 tablet by mouth daily.        citalopram 20 MG tablet    " celeXA    90 tablet    Take 1 tablet (20 mg) by mouth daily    Peripheral polyneuropathy (H)       gabapentin 300 MG capsule    NEURONTIN     Take 300 mg by mouth every evening        * HYDROcodone-acetaminophen 5-325 MG per tablet    NORCO     Take 0.5 tablets by mouth 2 times daily        * HYDROcodone-acetaminophen 5-325 MG per tablet    NORCO    30 tablet    Take 1-2 tablets by mouth every 4 hours as needed for other (Moderate to Severe Pain)    Ventral hernia without obstruction or gangrene       hyoscyamine 0.125 MG tablet    ANASPAZ/LEVSIN    30 tablet    Take 1 tablet (125 mcg) by mouth 4 times daily (before meals and nightly)    Irritable bowel syndrome, unspecified type       levothyroxine 125 MCG tablet    SYNTHROID/LEVOTHROID    90 tablet    Take 1 tablet (125 mcg) by mouth daily    Hypothyroidism, unspecified type       lisinopril 20 MG tablet    PRINIVIL/ZESTRIL    90 tablet    Take 1 tablet (20 mg) by mouth daily    Essential hypertension, benign, Type 2 diabetes mellitus with diabetic neuropathy, without long-term current use of insulin (H)       LYRICA 150 MG capsule   Generic drug:  pregabalin      Take 150 mg by mouth 3 times daily.        meclizine 25 MG tablet    ANTIVERT    30 tablet    TAKE ONE TABLET BY MOUTH EVERY SIX HOURS AS NEEDED FOR DIZZINESS    Vertigo, benign positional, unspecified laterality       metFORMIN 1000 MG tablet    GLUCOPHAGE    180 tablet    TAKE ONE TABLET BY MOUTH TWICE DAILY WITH MEALS    Type 2 diabetes mellitus without complication, without long-term current use of insulin (H)       OXYCONTIN 10 MG 12 hr tablet   Generic drug:  oxyCODONE      Take 10 mg by mouth At Bedtime        SENIOR MULTIVITAMIN PLUS Tabs      Take 1 tablet by mouth daily        simvastatin 20 MG tablet    ZOCOR    90 tablet    TAKE 1 TABLET (20 MG) BY MOUTH AT BEDTIME    Mixed hyperlipidemia, Type 2 diabetes mellitus with diabetic neuropathy, without long-term current use of insulin (H)        VITAMIN B 12 PO      Take 50 mcg by mouth daily        VITAMIN D (CHOLECALCIFEROL) PO      Take 1,000 Units by mouth daily        * Notice:  This list has 2 medication(s) that are the same as other medications prescribed for you. Read the directions carefully, and ask your doctor or other care provider to review them with you.

## 2017-08-11 LAB
BUN SERPL-MCNC: 22 MG/DL (ref 7–25)
BUN/CREATININE RATIO: 38 (CALC) (ref 6–22)
CALCIUM SERPL-MCNC: 9 MG/DL (ref 8.6–10.4)
CHLORIDE SERPLBLD-SCNC: 104 MMOL/L (ref 98–110)
CO2 SERPL-SCNC: 26 MMOL/L (ref 20–31)
CREAT SERPL-MCNC: 0.58 MG/DL (ref 0.6–0.88)
EGFR AFRICAN AMERICAN - QUEST: 100 ML/MIN/1.73M2
GFR SERPL CREATININE-BSD FRML MDRD: 86 ML/MIN/1.73M2
GLUCOSE - QUEST: 114 MG/DL (ref 65–99)
POTASSIUM SERPL-SCNC: 4.3 MMOL/L (ref 3.5–5.3)
SODIUM SERPL-SCNC: 139 MMOL/L (ref 135–146)
VIT B12 SERPL-MCNC: 326 PG/ML (ref 200–1100)

## 2017-09-13 ENCOUNTER — ALLIED HEALTH/NURSE VISIT (OUTPATIENT)
Dept: FAMILY MEDICINE | Facility: CLINIC | Age: 82
End: 2017-09-13

## 2017-09-13 DIAGNOSIS — Z23 NEED FOR VACCINATION: Primary | ICD-10-CM

## 2017-09-13 DIAGNOSIS — E53.9 B-COMPLEX DEFICIENCY: ICD-10-CM

## 2017-09-13 PROCEDURE — 90471 IMMUNIZATION ADMIN: CPT | Performed by: FAMILY MEDICINE

## 2017-09-13 PROCEDURE — 90686 IIV4 VACC NO PRSV 0.5 ML IM: CPT | Performed by: FAMILY MEDICINE

## 2017-09-13 NOTE — NURSING NOTE
Trisha Lucia is here for a  Flu and VIt B12 injection. Pt. signed consent, and VIS was given.    The following medication was given:     MEDICATION: Vitamin B12  1000mcg  ROUTE: IM  SITE: Deltoid - Right  DOSE: 1.0 mL  LOT #: [id number]  :  [employer]  EXPIRATION DATE:  8/18  NDC#: 5020-0201-54

## 2017-09-13 NOTE — MR AVS SNAPSHOT
After Visit Summary   9/13/2017    Trisha Lucia    MRN: [id number]           Patient Information     Date Of Birth          1935        Visit Information        Provider Department      9/13/2017 1:30 PM Judith Walker MD [facility]        Today's Diagnoses     Need for vaccination    -  1    B-complex deficiency           Follow-ups after your visit        Who to contact     If you have questions or need follow up information about today's clinic visit or your schedule please contact [facility] directly at [phone].  Normal or non-critical lab and imaging results will be communicated to you by LetsVenturehart, letter or phone within 4 business days after the clinic has received the results. If you do not hear from us within 7 days, please contact the clinic through LetsVenturehart or phone. If you have a critical or abnormal lab result, we will notify you by phone as soon as possible.  Submit refill requests through [employer] or call your pharmacy and they will forward the refill request to us. Please allow 3 business days for your refill to be completed.          Additional Information About Your Visit        MyChart Information     [employer] gives you secure access to your electronic health record. If you see a primary care provider, you can also send messages to your care team and make appointments. If you have questions, please call your primary care clinic.  If you do not have a primary care provider, please call [phone] and they will assist you.        Care EveryWhere ID     This is your Care EveryWhere ID. This could be used by other organizations to access your [facility] medical records  [id number]         Blood Pressure from Last 3 Encounters:   08/10/17 136/60   06/09/17 118/70   04/26/17 112/58    Weight from Last 3 Encounters:   08/10/17 82.1 kg (181 lb)   04/26/17 80.7 kg (178 lb)   02/16/17 81.7 kg (180 lb 3.2 oz)              We  Performed the Following     HC FLU VAC PRESRV FREE QUAD SPLIT VIR 3+YRS IM     VACCINE ADMINISTRATION, INITIAL     VITAMIN B12 INJ /1000MCG        Primary Care Provider Office Phone # Fax #    Judith Walker [phone] [address] [address]        Equal Access to Services     [facility] : Hadii aad ku hadasho Soomaali, waaxda luqadaha, qaybta kaalmada adeegyada, waxay idiin hayaan adeeg kharash laSylviaaan . So [facility] [phone].    ATENCIÓN: Si habla español, tiene a duarte disposición servicios gratuitos de asistencia lingüística. Llame al [phone].    We comply with applicable federal civil rights laws and [place] laws. We do not discriminate on the basis of race, color, national origin, age, disability sex, sexual orientation or gender identity.            Thank you!     Thank you for choosing [facility]  for your care. Our goal is always to provide you with excellent care. Hearing back from our patients is one way we can continue to improve our services. Please take a few minutes to complete the written survey that you may receive in the mail after your visit with us. Thank you!             Your Updated Medication List - Protect others around you: Learn how to safely use, store and throw away your medicines at www.disposemymeds.org.          This list is accurate as of: 9/13/17  2:41 PM.  Always use your most recent med list.                   Brand Name Dispense Instructions for use Diagnosis    aspirin 81 MG EC tablet     90 tablet    Take 1 tablet by mouth daily.        citalopram 20 MG tablet    celeXA    90 tablet    Take 1 tablet (20 mg) by mouth daily    Peripheral polyneuropathy (H)       gabapentin 300 MG capsule    NEURONTIN     Take 300 mg by mouth every evening        * HYDROcodone-acetaminophen 5-325 MG per tablet    NORCO     Take 0.5 tablets by mouth 2 times daily        * HYDROcodone-acetaminophen 5-325 MG per tablet     NORCO    30 tablet    Take 1-2 tablets by mouth every 4 hours as needed for other (Moderate to Severe Pain)    Ventral hernia without obstruction or gangrene       hyoscyamine 0.125 MG tablet    ANASPAZ/LEVSIN    30 tablet    Take 1 tablet (125 mcg) by mouth 4 times daily (before meals and nightly)    Irritable bowel syndrome, unspecified type       levothyroxine 125 MCG tablet    SYNTHROID/LEVOTHROID    90 tablet    Take 1 tablet (125 mcg) by mouth daily    Hypothyroidism, unspecified type       lisinopril 20 MG tablet    PRINIVIL/ZESTRIL    90 tablet    Take 1 tablet (20 mg) by mouth daily    Essential hypertension, benign, Type 2 diabetes mellitus with diabetic neuropathy, without long-term current use of insulin (H)       LYRICA 150 MG capsule   Generic drug:  pregabalin      Take 150 mg by mouth 3 times daily.        meclizine 25 MG tablet    ANTIVERT    30 tablet    TAKE ONE TABLET BY MOUTH EVERY SIX HOURS AS NEEDED FOR DIZZINESS    Vertigo, benign positional, unspecified laterality       metFORMIN 1000 MG tablet    GLUCOPHAGE    180 tablet    TAKE ONE TABLET BY MOUTH TWICE DAILY WITH MEALS    Type 2 diabetes mellitus without complication, without long-term current use of insulin (H)       OXYCONTIN 10 MG 12 hr tablet   Generic drug:  oxyCODONE      Take 10 mg by mouth At Bedtime        SENIOR MULTIVITAMIN PLUS Tabs      Take 1 tablet by mouth daily        simvastatin 20 MG tablet    ZOCOR    90 tablet    TAKE 1 TABLET (20 MG) BY MOUTH AT BEDTIME    Mixed hyperlipidemia, Type 2 diabetes mellitus with diabetic neuropathy, without long-term current use of insulin (H)       VITAMIN B 12 PO      Take 50 mcg by mouth daily        VITAMIN D (CHOLECALCIFEROL) PO      Take 1,000 Units by mouth daily        * Notice:  This list has 2 medication(s) that are the same as other medications prescribed for you. Read the directions carefully, and ask your doctor or other care provider to review them with you.

## 2017-10-04 DIAGNOSIS — G62.9 PERIPHERAL POLYNEUROPATHY: ICD-10-CM

## 2017-10-04 RX ORDER — CITALOPRAM HYDROBROMIDE 20 MG/1
TABLET ORAL
Qty: 90 TABLET | Refills: 0 | Status: SHIPPED | OUTPATIENT
Start: 2017-10-04 | End: 2017-12-28

## 2017-10-04 NOTE — TELEPHONE ENCOUNTER
Pending Prescriptions:                       Disp   Refills    citalopram (CELEXA) 20 MG tablet [Pharmac*30 tab*0            Sig: TAKE ONE TABLET BY MOUTH ONE TIME DAILY     Pt last refill was 2-  Pt was last seen in 6-2017  Please change qty, fax, or send 30 in and send to FD for a non fasting ov  Eves  [phone] (home)

## 2017-10-16 ENCOUNTER — TRANSFERRED RECORDS (OUTPATIENT)
Dept: FAMILY MEDICINE | Facility: CLINIC | Age: 82
End: 2017-10-16

## 2017-10-17 ENCOUNTER — TELEPHONE (OUTPATIENT)
Dept: FAMILY MEDICINE | Facility: CLINIC | Age: 82
End: 2017-10-17

## 2017-10-17 ENCOUNTER — ALLIED HEALTH/NURSE VISIT (OUTPATIENT)
Dept: FAMILY MEDICINE | Facility: CLINIC | Age: 82
End: 2017-10-17

## 2017-10-17 DIAGNOSIS — E53.9 B-COMPLEX DEFICIENCY: Primary | ICD-10-CM

## 2017-10-17 PROCEDURE — 96372 THER/PROPH/DIAG INJ SC/IM: CPT | Performed by: FAMILY MEDICINE

## 2017-10-17 NOTE — NURSING NOTE
The following medication was given:     MEDICATION: Vitamin B12  1000mcg  ROUTE: IM  SITE: Deltoid - Left  DOSE: 1.0 mL  LOT #: [id number]  :  Letsgofordinner  EXPIRATION DATE:  2/19  NDC#: 0557-9402-27

## 2017-10-17 NOTE — MR AVS SNAPSHOT
After Visit Summary   10/17/2017    Trisha Lucia    MRN: [id number]           Patient Information     Date Of Birth          1935        Visit Information        Provider Department      10/17/2017 3:00 PM Judith Walker MD [facility]        Today's Diagnoses     B-complex deficiency    -  1       Follow-ups after your visit        Who to contact     If you have questions or need follow up information about today's clinic visit or your schedule please contact [facility] directly at [phone].  Normal or non-critical lab and imaging results will be communicated to you by [email or web address], letter or phone within 4 business days after the clinic has received the results. If you do not hear from us within 7 days, please contact the clinic through [email or web address] or phone. If you have a critical or abnormal lab result, we will notify you by phone as soon as possible.  Submit refill requests through [employer] or call your pharmacy and they will forward the refill request to us. Please allow 3 business days for your refill to be completed.          Additional Information About Your Visit        MyChart Information     [employer] gives you secure access to your electronic health record. If you see a primary care provider, you can also send messages to your care team and make appointments. If you have questions, please call your primary care clinic.  If you do not have a primary care provider, please call [phone] and they will assist you.        Care EveryWhere ID     This is your Care EveryWhere ID. This could be used by other organizations to access your [place] medical records  [id number]         Blood Pressure from Last 3 Encounters:   08/10/17 136/60   06/09/17 118/70   04/26/17 112/58    Weight from Last 3 Encounters:   08/10/17 82.1 kg (181 lb)   04/26/17 80.7 kg (178 lb)   02/16/17 81.7 kg (180 lb 3.2 oz)              We Performed the Following      VITAMIN B12 INJ /1000G        Primary Care Provider Office Phone # Fax #    Judith Walker [phone] [address] [facility] [address]  [facility] [address]        Equal Access to Services     ROSA ISBELL : Hadii sen howe hadkimo Soomaali, waaxda luqadaha, qaybta kaalmada adeegyada, raul ronquillon kamronjayesh ny laSylviacristina leonard. So [facility] [phone].    ATENCIÓN: Si habla español, tiene a duarte disposición servicios gratuitos de asistencia lingüística. Llame al [phone].    We comply with applicable federal civil rights laws and [place] laws. We do not discriminate on the basis of race, color, national origin, age, disability, sex, sexual orientation, or gender identity.            Thank you!     Thank you for choosing [facility]  for your care. Our goal is always to provide you with excellent care. Hearing back from our patients is one way we can continue to improve our services. Please take a few minutes to complete the written survey that you may receive in the mail after your visit with us. Thank you!             Your Updated Medication List - Protect others around you: Learn how to safely use, store and throw away your medicines at www.disposemymeds.org.          This list is accurate as of: 10/17/17  3:23 PM.  Always use your most recent med list.                   Brand Name Dispense Instructions for use Diagnosis    aspirin 81 MG EC tablet     90 tablet    Take 1 tablet by mouth daily.        citalopram 20 MG tablet    celeXA    90 tablet    TAKE ONE TABLET BY MOUTH ONE TIME DAILY    Peripheral polyneuropathy       gabapentin 300 MG capsule    NEURONTIN     Take 300 mg by mouth every evening        * HYDROcodone-acetaminophen 5-325 MG per tablet    NORCO     Take 0.5 tablets by mouth 2 times daily        * HYDROcodone-acetaminophen 5-325 MG per tablet    NORCO    30 tablet    Take 1-2 tablets by mouth every 4 hours as needed for other (Moderate to Severe Pain)     Ventral hernia without obstruction or gangrene       hyoscyamine 0.125 MG tablet    ANASPAZ/LEVSIN    30 tablet    Take 1 tablet (125 mcg) by mouth 4 times daily (before meals and nightly)    Irritable bowel syndrome, unspecified type       levothyroxine 125 MCG tablet    SYNTHROID/LEVOTHROID    90 tablet    Take 1 tablet (125 mcg) by mouth daily    Hypothyroidism, unspecified type       lisinopril 20 MG tablet    PRINIVIL/ZESTRIL    90 tablet    Take 1 tablet (20 mg) by mouth daily    Essential hypertension, benign, Type 2 diabetes mellitus with diabetic neuropathy, without long-term current use of insulin (H)       LYRICA 150 MG capsule   Generic drug:  pregabalin      Take 150 mg by mouth 3 times daily.        meclizine 25 MG tablet    ANTIVERT    30 tablet    TAKE ONE TABLET BY MOUTH EVERY SIX HOURS AS NEEDED FOR DIZZINESS    Vertigo, benign positional, unspecified laterality       metFORMIN 1000 MG tablet    GLUCOPHAGE    180 tablet    TAKE ONE TABLET BY MOUTH TWICE DAILY WITH MEALS    Type 2 diabetes mellitus without complication, without long-term current use of insulin (H)       OXYCONTIN 10 MG 12 hr tablet   Generic drug:  oxyCODONE      Take 10 mg by mouth At Bedtime        SENIOR MULTIVITAMIN PLUS Tabs      Take 1 tablet by mouth daily        simvastatin 20 MG tablet    ZOCOR    90 tablet    TAKE 1 TABLET (20 MG) BY MOUTH AT BEDTIME    Mixed hyperlipidemia, Type 2 diabetes mellitus with diabetic neuropathy, without long-term current use of insulin (H)       VITAMIN B 12 PO      Take 50 mcg by mouth daily        VITAMIN D (CHOLECALCIFEROL) PO      Take 1,000 Units by mouth daily        * Notice:  This list has 2 medication(s) that are the same as other medications prescribed for you. Read the directions carefully, and ask your doctor or other care provider to review them with you.

## 2017-10-17 NOTE — TELEPHONE ENCOUNTER
Please fill out form from [employer]  And fax to them at  [phone]. Any question please call patient.

## 2017-10-19 NOTE — TELEPHONE ENCOUNTER
Dr. Walker we have received a form from [facility].     It is ready for you to review and fill out.     In your POD.     GISSELLE Herbert ([facility])

## 2017-10-20 NOTE — TELEPHONE ENCOUNTER
Orders have been signed, faxed and ready for scanning.     Jeri.GISSELLE Medina ([facility])

## 2017-10-24 NOTE — TELEPHONE ENCOUNTER
Marissa called regarding the form that was filled out for special shoes for Trisha.   Two options were missed for her to qualify for shoes.     I have checked them and am re faxing this back to her.    Then it just needs to be re scanned into her chart.     Jeri.GISSELLE Medina ([facility])

## 2017-11-02 ENCOUNTER — OFFICE VISIT (OUTPATIENT)
Dept: FAMILY MEDICINE | Facility: CLINIC | Age: 82
End: 2017-11-02

## 2017-11-02 VITALS
SYSTOLIC BLOOD PRESSURE: 126 MMHG | OXYGEN SATURATION: 97 % | TEMPERATURE: 97.9 F | HEART RATE: 34 BPM | BODY MASS INDEX: 33.57 KG/M2 | WEIGHT: 180.6 LBS | DIASTOLIC BLOOD PRESSURE: 60 MMHG

## 2017-11-02 DIAGNOSIS — F41.1 GAD (GENERALIZED ANXIETY DISORDER): Primary | ICD-10-CM

## 2017-11-02 PROCEDURE — 99213 OFFICE O/P EST LOW 20 MIN: CPT | Performed by: PHYSICIAN ASSISTANT

## 2017-11-02 ASSESSMENT — ANXIETY QUESTIONNAIRES
IF YOU CHECKED OFF ANY PROBLEMS ON THIS QUESTIONNAIRE, HOW DIFFICULT HAVE THESE PROBLEMS MADE IT FOR YOU TO DO YOUR WORK, TAKE CARE OF THINGS AT HOME, OR GET ALONG WITH OTHER PEOPLE: EXTREMELY DIFFICULT
5. BEING SO RESTLESS THAT IT IS HARD TO SIT STILL: MORE THAN HALF THE DAYS
6. BECOMING EASILY ANNOYED OR IRRITABLE: NEARLY EVERY DAY
7. FEELING AFRAID AS IF SOMETHING AWFUL MIGHT HAPPEN: SEVERAL DAYS
1. FEELING NERVOUS, ANXIOUS, OR ON EDGE: NEARLY EVERY DAY
GAD7 TOTAL SCORE: 16
2. NOT BEING ABLE TO STOP OR CONTROL WORRYING: NEARLY EVERY DAY
3. WORRYING TOO MUCH ABOUT DIFFERENT THINGS: MORE THAN HALF THE DAYS

## 2017-11-02 ASSESSMENT — PATIENT HEALTH QUESTIONNAIRE - PHQ9: 5. POOR APPETITE OR OVEREATING: MORE THAN HALF THE DAYS

## 2017-11-02 NOTE — PROGRESS NOTES
SUBJECTIVE:                                                    Trisha Lucia is a 82 year old female who presents to clinic today for the following health issues:      Here with worsening anxiety regarding care of her .  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 She does everything for him aside from bathe and toilet.  She said that he sleeps all day, is up all night.  She has had decreased social life due to taking care of him. Has a group of 3 friends she sees monthly for movies.      She is not seeing a therapist. Denies self harm.     Previously on 20 mg Celexa - now on 1/2, wants to inc. To 20 mg again.         BP Readings from Last 3 Encounters:   11/02/17 126/60   08/10/17 136/60   06/09/17 118/70    Wt Readings from Last 3 Encounters:   11/02/17 81.9 kg (180 lb 9.6 oz)   08/10/17 82.1 kg (181 lb)   04/26/17 80.7 kg (178 lb)                  Patient Active Problem List   Diagnosis     Essential hypertension, benign     Other specified idiopathic peripheral neuropathy     Hypothyroidism     Osteoarthrosis, unspecified whether generalized or localized, involving lower leg     Obesity     Irritable bowel syndrome     Mixed hyperlipidemia     ANNA (obstructive sleep apnea)     Health Care Home     B-complex deficiency     Hallux valgus with bunions     ACP (advance care planning)     Type 2 diabetes mellitus with diabetic neuropathy (H)     Pernicious anemia     Spinal stenosis     Past Surgical History:   Procedure Laterality Date     BUNIONECTOMY  4/9/2013    Procedure: BUNIONECTOMY;  RIGHT GREAT CLAWTOE CORRECTION WITH TENDON TRANSFER, ENDOSCOPIC RECESSION OF GASTRONEMIUS, DEBRIDEMENT OF ULCER ON SOLE OF RIGHT FOOT;  Surgeon: Stephon Rae MD;  Location:  [facility]     C OPEN TX TRIMALLEOLAR ANKLE FX W FIX PST LIP Right 2014     ENDOSCOPIC RECESSION GASTROCNEMIUS (DONTA)  4/9/2013    Procedure: ENDOSCOPIC RECESSION GASTROCNEMIUS (DONTA);;  Surgeon: Stephon Rae MD;  Location:  [facility]      HC COLONOSCOPY THRU STOMA, DIAGNOSTIC  2001     HC DEBRIDMENT MASTOID CAVITY, SIMPLE      L ear     HC KNEE SCOPE, DIAGNOSTIC  1997    Arthroscopy, Knee     HC KNEE SCOPE, DIAGNOSTIC  2001    Arthroscopy, Knee     HC REPAIR OF CHARITOE,ONE  2015    [facility]     HERNIORRHAPHY INCISIONAL (LOCATION) N/A 1/13/2017    Procedure: HERNIORRHAPHY INCISIONAL (LOCATION);  Surgeon: Joaquin Skaggs MD;  Location: [facility] OR     IRRIGATION AND DEBRIDEMENT FOOT, COMBINED  4/9/2013    Procedure: COMBINED IRRIGATION AND DEBRIDEMENT FOOT;  Debridement of sole ulcer right foot;  Surgeon: Stephon Rae MD;  Location: [facility]     KNEE SURGERY  2011    right total-Dr. Gorman     Small bowel obstruction  6/2011    Small-bowel obstruction, status post laparotomy with lysis of adhesions, small bowel resection with primary enteroenterostomy and appendectomy       Social History   Substance Use Topics     Smoking status: Never Smoker     Smokeless tobacco: Never Used     Alcohol use No      Comment: 0     Family History   Problem Relation Age of Onset     C.A.D. Mother      DIABETES No family hx of      Hypertension Mother      Breast Cancer No family hx of      Cancer - colorectal No family hx of            Current Outpatient Prescriptions   Medication Sig Dispense Refill     citalopram (CELEXA) 20 MG tablet TAKE ONE TABLET BY MOUTH ONE TIME DAILY  90 tablet 0     meclizine (ANTIVERT) 25 MG tablet TAKE ONE TABLET BY MOUTH EVERY SIX HOURS AS NEEDED FOR DIZZINESS 30 tablet 0     hyoscyamine (ANASPAZ/LEVSIN) 0.125 MG tablet Take 1 tablet (125 mcg) by mouth 4 times daily (before meals and nightly) 30 tablet 1     simvastatin (ZOCOR) 20 MG tablet TAKE 1 TABLET (20 MG) BY MOUTH AT BEDTIME 90 tablet 1     metFORMIN (GLUCOPHAGE) 1000 MG tablet TAKE ONE TABLET BY MOUTH TWICE DAILY WITH MEALS 180 tablet 1     lisinopril (PRINIVIL/ZESTRIL) 20 MG tablet Take 1 tablet (20 mg) by mouth daily 90 tablet 1     levothyroxine (SYNTHROID/LEVOTHROID) 125  MCG tablet Take 1 tablet (125 mcg) by mouth daily 90 tablet 1     VITAMIN D, CHOLECALCIFEROL, PO Take 1,000 Units by mouth daily       Cyanocobalamin (VITAMIN B 12 PO) Take 50 mcg by mouth daily       HYDROcodone-acetaminophen (NORCO) 5-325 MG per tablet Take 1-2 tablets by mouth every 4 hours as needed for other (Moderate to Severe Pain) 30 tablet 0     OXYCONTIN 10 MG 12 hr tablet Take 10 mg by mouth At Bedtime   0     gabapentin (NEURONTIN) 300 MG capsule Take 300 mg by mouth every evening        HYDROcodone-acetaminophen (NORCO) 5-325 MG per tablet Take 0.5 tablets by mouth 2 times daily        Multiple Vitamins-Minerals (SENIOR MULTIVITAMIN PLUS) TABS Take 1 tablet by mouth daily        aspirin 81 MG EC tablet Take 1 tablet by mouth daily. 90 tablet 3     pregabalin (LYRICA) 150 MG capsule Take 150 mg by mouth 3 times daily.       Allergies   Allergen Reactions     Cephalexin Other (See Comments) and Itching     Redness and peeling skin     Penicillins      Childhood rxn of red dots     Percocet [Oxycodone-Acetaminophen]      hallucinations     Shellfish Allergy      Sulfa Drugs          OBJECTIVE:                                                    /60 (BP Location: Left arm, Patient Position: Chair, Cuff Size: Adult Large)  Pulse (!) 34  Temp 97.9  F (36.6  C) (Oral)  Wt 81.9 kg (180 lb 9.6 oz)  SpO2 97%  Breastfeeding? No  BMI 33.57 kg/m2 Body mass index is 33.57 kg/(m^2).       Mental Status Exam:   Appearance: anxious  Grooming: adequately groomed  Demeanor: engaged, cooperative  Affect: normal  Speech: Normal.  Gait:Normal.  Movements: Normal  Form of thought: Logical, Linear and Goal directed  Thought content:  Normal  Insight:Good   Judgment: Good   Cognition: Good              ASSESSMENT/PLAN:                                                        ICD-10-CM    1. SONYA (generalized anxiety disorder) F41.1        Inc to 20 mg daily  Interaction with Norco discussed  See PCP 1 months  recheck    I have recommended that the patient make a therapist appointment, and a list of local mental health clinics has been provided to the patient. He/she has been advised to check insurance coverage and the patient will call to schedule an appointment.       Marylin Moralez PA-C  [facility]

## 2017-11-03 ASSESSMENT — ANXIETY QUESTIONNAIRES: GAD7 TOTAL SCORE: 16

## 2017-11-29 DIAGNOSIS — E03.9 HYPOTHYROIDISM, UNSPECIFIED TYPE: ICD-10-CM

## 2017-11-29 RX ORDER — LEVOTHYROXINE SODIUM 125 UG/1
TABLET ORAL
Qty: 90 TABLET | Refills: 0 | OUTPATIENT
Start: 2017-11-29

## 2017-11-29 RX ORDER — LEVOTHYROXINE SODIUM 125 UG/1
125 TABLET ORAL DAILY
Qty: 30 TABLET | Refills: 0 | COMMUNITY
Start: 2017-11-29 | End: 2018-06-13

## 2017-11-29 NOTE — TELEPHONE ENCOUNTER
Refused Prescriptions:                       Disp   Refills    levothyroxine (SYNTHROID/LEVOTHROID) 125 M*90 tab*0        Sig: TAKE ONE TABLET BY MOUTH ONE TIME DAILY   Refused By: JODI ACUÑA  Reason for Refusal: Request already responded to by other means (phone, fax, etc.)  Reason for Refusal Comment: called in 30 today 11-      Kemi Whitehall   Levothyroxine-30  Pt has a fasting appt on 12-  Ev  [phone] (home)

## 2017-12-12 ENCOUNTER — ALLIED HEALTH/NURSE VISIT (OUTPATIENT)
Dept: FAMILY MEDICINE | Facility: CLINIC | Age: 82
End: 2017-12-12

## 2017-12-12 DIAGNOSIS — E53.9 B-COMPLEX DEFICIENCY: Primary | ICD-10-CM

## 2017-12-12 PROCEDURE — 96372 THER/PROPH/DIAG INJ SC/IM: CPT | Performed by: FAMILY MEDICINE

## 2017-12-12 NOTE — NURSING NOTE
The following medication was given:     MEDICATION: Vitamin B12  1000mcg  ROUTE: IM  SITE: Deltoid - Right  DOSE: 1.0 mL  LOT #: [id number]  :  [employer] Software  EXPIRATION DATE:  5/19  NDC#: 2861-0606-61

## 2017-12-12 NOTE — MR AVS SNAPSHOT
After Visit Summary   12/12/2017    Trisha Lucia    MRN: [id number]           Patient Information     Date Of Birth          1935        Visit Information        Provider Department      12/12/2017 1:00 PM Judith Walker MD [facility]        Today's Diagnoses     B-complex deficiency    -  1       Follow-ups after your visit        Your next 10 appointments already scheduled     Dec 28, 2017 10:30 AM CST   Office Visit with Judith Walker MD   [facility] ([facility])    [address]  [address]  [facility] [id number] [phone]              Who to contact     If you have questions or need follow up information about today's clinic visit or your schedule please contact [facility] directly at [phone].  Normal or non-critical lab and imaging results will be communicated to you by MyChart, letter or phone within 4 business days after the clinic has received the results. If you do not hear from us within 7 days, please contact the clinic through [employer] or phone. If you have a critical or abnormal lab result, we will notify you by phone as soon as possible.  Submit refill requests through [employer] or call your pharmacy and they will forward the refill request to us. Please allow 3 business days for your refill to be completed.          Additional Information About Your Visit        MyChart Information     [facility] [employer] gives you secure access to your electronic health record. If you see a primary care provider, you can also send messages to your care team and make appointments. If you have questions, please call your primary care clinic.  If you do not have a primary care provider, please call [phone] and they will assist you.        Care EveryWhere ID     This is your Care EveryWhere ID. This could be used by other organizations to access your [facility]  records  [id number]         Blood Pressure from Last 3 Encounters:   11/02/17 126/60   08/10/17 136/60   06/09/17 118/70    Weight from Last 3 Encounters:   11/02/17 81.9 kg (180 lb 9.6 oz)   08/10/17 82.1 kg (181 lb)   04/26/17 80.7 kg (178 lb)              We Performed the Following     VITAMIN B12 INJ /1000MCG        Primary Care Provider Office Phone # Fax #    Judith Walker [phone] [address] [address]        Equal Access to Services     [facility]: Hadii aad carley hadasho Soleonor, waaxda luqadaha, qaybta kaalmada aderkda, raul ocampo . So [facility] [phone].    ATENCIÓN: Si habla español, tiene a duarte disposición servicios gratuitos de asistencia lingüística. Llame al [phone].    We comply with applicable federal civil rights laws and [place] laws. We do not discriminate on the basis of race, color, national origin, age, disability, sex, sexual orientation, or gender identity.            Thank you!     Thank you for choosing [facility]  for your care. Our goal is always to provide you with excellent care. Hearing back from our patients is one way we can continue to improve our services. Please take a few minutes to complete the written survey that you may receive in the mail after your visit with us. Thank you!             Your Updated Medication List - Protect others around you: Learn how to safely use, store and throw away your medicines at www.disposemymeds.org.          This list is accurate as of: 12/12/17  1:16 PM.  Always use your most recent med list.                   Brand Name Dispense Instructions for use Diagnosis    aspirin 81 MG EC tablet     90 tablet    Take 1 tablet by mouth daily.        citalopram 20 MG tablet    celeXA    90 tablet    TAKE ONE TABLET BY MOUTH ONE TIME DAILY    Peripheral polyneuropathy       gabapentin 300 MG capsule    NEURONTIN     Take 300 mg by  mouth every evening        * HYDROcodone-acetaminophen 5-325 MG per tablet    NORCO     Take 0.5 tablets by mouth 2 times daily        * HYDROcodone-acetaminophen 5-325 MG per tablet    NORCO    30 tablet    Take 1-2 tablets by mouth every 4 hours as needed for other (Moderate to Severe Pain)    Ventral hernia without obstruction or gangrene       hyoscyamine 0.125 MG tablet    ANASPAZ/LEVSIN    30 tablet    Take 1 tablet (125 mcg) by mouth 4 times daily (before meals and nightly)    Irritable bowel syndrome, unspecified type       levothyroxine 125 MCG tablet    SYNTHROID/LEVOTHROID    30 tablet    Take 1 tablet (125 mcg) by mouth daily    Hypothyroidism, unspecified type       lisinopril 20 MG tablet    PRINIVIL/ZESTRIL    90 tablet    Take 1 tablet (20 mg) by mouth daily    Essential hypertension, benign, Type 2 diabetes mellitus with diabetic neuropathy, without long-term current use of insulin (H)       LYRICA 150 MG capsule   Generic drug:  pregabalin      Take 150 mg by mouth 3 times daily.        meclizine 25 MG tablet    ANTIVERT    30 tablet    TAKE ONE TABLET BY MOUTH EVERY SIX HOURS AS NEEDED FOR DIZZINESS    Vertigo, benign positional, unspecified laterality       metFORMIN 1000 MG tablet    GLUCOPHAGE    180 tablet    TAKE ONE TABLET BY MOUTH TWICE DAILY WITH MEALS    Type 2 diabetes mellitus without complication, without long-term current use of insulin (H)       OXYCONTIN 10 MG 12 hr tablet   Generic drug:  oxyCODONE      Take 10 mg by mouth At Bedtime        SENIOR MULTIVITAMIN PLUS Tabs      Take 1 tablet by mouth daily        simvastatin 20 MG tablet    ZOCOR    90 tablet    TAKE 1 TABLET (20 MG) BY MOUTH AT BEDTIME    Mixed hyperlipidemia, Type 2 diabetes mellitus with diabetic neuropathy, without long-term current use of insulin (H)       VITAMIN B 12 PO      Take 50 mcg by mouth daily        VITAMIN D (CHOLECALCIFEROL) PO      Take 1,000 Units by mouth daily        * Notice:  This list has 2  medication(s) that are the same as other medications prescribed for you. Read the directions carefully, and ask your doctor or other care provider to review them with you.

## 2017-12-28 ENCOUNTER — OFFICE VISIT (OUTPATIENT)
Dept: FAMILY MEDICINE | Facility: CLINIC | Age: 82
End: 2017-12-28

## 2017-12-28 VITALS
TEMPERATURE: 97.6 F | WEIGHT: 181.4 LBS | HEART RATE: 66 BPM | SYSTOLIC BLOOD PRESSURE: 114 MMHG | DIASTOLIC BLOOD PRESSURE: 70 MMHG | BODY MASS INDEX: 33.72 KG/M2 | OXYGEN SATURATION: 96 %

## 2017-12-28 DIAGNOSIS — G62.9 PERIPHERAL POLYNEUROPATHY: ICD-10-CM

## 2017-12-28 DIAGNOSIS — E11.40 TYPE 2 DIABETES MELLITUS WITH DIABETIC NEUROPATHY, WITHOUT LONG-TERM CURRENT USE OF INSULIN (H): Primary | ICD-10-CM

## 2017-12-28 DIAGNOSIS — E78.2 MIXED HYPERLIPIDEMIA: ICD-10-CM

## 2017-12-28 DIAGNOSIS — I10 ESSENTIAL HYPERTENSION, BENIGN: ICD-10-CM

## 2017-12-28 DIAGNOSIS — E03.9 HYPOTHYROIDISM, UNSPECIFIED TYPE: ICD-10-CM

## 2017-12-28 DIAGNOSIS — E11.9 TYPE 2 DIABETES MELLITUS WITHOUT COMPLICATION, WITHOUT LONG-TERM CURRENT USE OF INSULIN (H): ICD-10-CM

## 2017-12-28 LAB — HBA1C MFR BLD: 6.4 % (ref 4–7)

## 2017-12-28 PROCEDURE — 36415 COLL VENOUS BLD VENIPUNCTURE: CPT | Performed by: FAMILY MEDICINE

## 2017-12-28 PROCEDURE — 84443 ASSAY THYROID STIM HORMONE: CPT | Mod: 90 | Performed by: FAMILY MEDICINE

## 2017-12-28 PROCEDURE — 80048 BASIC METABOLIC PNL TOTAL CA: CPT | Mod: 90 | Performed by: FAMILY MEDICINE

## 2017-12-28 PROCEDURE — 99214 OFFICE O/P EST MOD 30 MIN: CPT | Performed by: FAMILY MEDICINE

## 2017-12-28 PROCEDURE — 80061 LIPID PANEL: CPT | Mod: 90 | Performed by: FAMILY MEDICINE

## 2017-12-28 PROCEDURE — 84439 ASSAY OF FREE THYROXINE: CPT | Mod: 90 | Performed by: FAMILY MEDICINE

## 2017-12-28 PROCEDURE — 83036 HEMOGLOBIN GLYCOSYLATED A1C: CPT | Performed by: FAMILY MEDICINE

## 2017-12-28 RX ORDER — LEVOTHYROXINE SODIUM 125 UG/1
125 TABLET ORAL DAILY
Qty: 90 TABLET | Refills: 3 | Status: CANCELLED | OUTPATIENT
Start: 2017-12-28

## 2017-12-28 ASSESSMENT — PATIENT HEALTH QUESTIONNAIRE - PHQ9: SUM OF ALL RESPONSES TO PHQ QUESTIONS 1-9: 9

## 2017-12-28 NOTE — NURSING NOTE
Trisha is here for med check    Pre-visit Screening:  Immunizations:  up to date  Colonoscopy:  is up to date  Mammogram: is up to date  Asthma Action Test/Plan:  KAL  PHQ9:  Done today  GAD7:  NA  Questioned patient about current smoking habits Pt. has never smoked.  Ok to leave detailed message on voice mail for today's visit only YES, phone # [phone]

## 2017-12-28 NOTE — PROGRESS NOTES
"  SUBJECTIVE:   Trisha Lucia is a 82 year old female who presents to clinic today for the following health issues:      six weeks ago  Doing well  Sleeping  Good family support    Requests refill of citalopram : anxious two weeks before he :  Will still on occasion get chest \"bouncing\"    Diabetes Follow-up      Patient is checking blood sugars: no recorded blood glucose to review    Diabetic concerns: None     Symptoms of hypoglycemia (low blood sugar): none     Paresthesias (numbness or burning in feet) or sores: Yes numbness extends to mid calf     Date of last diabetic eye exam: scheduled    BP Readings from Last 2 Encounters:   17 126/60   08/10/17 136/60     Hemoglobin A1C (%)   Date Value   2017 7.0   2017 7.2 (A)     LDL Cholesterol Calculated (mg/dL (calc))   Date Value   2017 84   2016 67     Hypothyroidism Follow-up      Since last visit, patient describes the following symptoms: Weight stable, no hair loss, no skin changes, no constipation, no loose stools        Amount of exercise or physical activity: none    Problems taking medications regularly: No    Medication side effects: none    Diet: regular/ family and friends are bringing food      Problem list and histories reviewed & adjusted, as indicated.  Additional history: as documented    Patient Active Problem List   Diagnosis     Essential hypertension, benign     Other specified idiopathic peripheral neuropathy     Hypothyroidism     Osteoarthrosis, unspecified whether generalized or localized, involving lower leg     Obesity     Irritable bowel syndrome     Mixed hyperlipidemia     ANNA (obstructive sleep apnea)     Health Care Home     B-complex deficiency     Hallux valgus with bunions     ACP (advance care planning)     Type 2 diabetes mellitus with diabetic neuropathy (H)     Pernicious anemia     Spinal stenosis     SONYA (generalized anxiety disorder)     Past Surgical History:   Procedure " Laterality Date     BUNIONECTOMY  4/9/2013    Procedure: BUNIONECTOMY;  RIGHT GREAT CLAWTOE CORRECTION WITH TENDON TRANSFER, ENDOSCOPIC RECESSION OF GASTRONEMIUS, DEBRIDEMENT OF ULCER ON SOLE OF RIGHT FOOT;  Surgeon: Stephon Rae MD;  Location: [facility]     C OPEN TX TRIMALLEOLAR ANKLE FX W FIX PST LIP Right 2014     ENDOSCOPIC RECESSION GASTROCNEMIUS (DONTA)  4/9/2013    Procedure: ENDOSCOPIC RECESSION GASTROCNEMIUS (DONTA);;  Surgeon: Stephon Rae MD;  Location: [facility] COLONOSCOPY THRU STOMA, DIAGNOSTIC  2001     HC DEBRIDMENT MASTOID CAVITY, SIMPLE      L ear     HC KNEE SCOPE, DIAGNOSTIC  1997    Arthroscopy, Knee     HC KNEE SCOPE, DIAGNOSTIC  2001    Arthroscopy, Knee     HC REPAIR OF HAMMERTOE,ONE  2015    [facility]     HERNIORRHAPHY INCISIONAL (LOCATION) N/A 1/13/2017    Procedure: HERNIORRHAPHY INCISIONAL (LOCATION);  Surgeon: Joaquin Skaggs MD;  Location: [facility]     IRRIGATION AND DEBRIDEMENT FOOT, COMBINED  4/9/2013    Procedure: COMBINED IRRIGATION AND DEBRIDEMENT FOOT;  Debridement of sole ulcer right foot;  Surgeon: Stephon Rae MD;  Location: [facility]     KNEE SURGERY  2011    right total-Dr. Gorman     Small bowel obstruction  6/2011    Small-bowel obstruction, status post laparotomy with lysis of adhesions, small bowel resection with primary enteroenterostomy and appendectomy       Social History   Substance Use Topics     Smoking status: Never Smoker     Smokeless tobacco: Never Used     Alcohol use No      Comment: 0     Family History   Problem Relation Age of Onset     C.A.D. Mother      DIABETES No family hx of      Hypertension Mother      Breast Cancer No family hx of      Cancer - colorectal No family hx of          Current Outpatient Prescriptions   Medication Sig Dispense Refill     levothyroxine (SYNTHROID/LEVOTHROID) 125 MCG tablet Take 1 tablet (125 mcg) by mouth daily 30 tablet 0     citalopram (CELEXA) 20 MG tablet TAKE ONE TABLET BY MOUTH  ONE TIME DAILY  90 tablet 0     meclizine (ANTIVERT) 25 MG tablet TAKE ONE TABLET BY MOUTH EVERY SIX HOURS AS NEEDED FOR DIZZINESS 30 tablet 0     hyoscyamine (ANASPAZ/LEVSIN) 0.125 MG tablet Take 1 tablet (125 mcg) by mouth 4 times daily (before meals and nightly) 30 tablet 1     simvastatin (ZOCOR) 20 MG tablet TAKE 1 TABLET (20 MG) BY MOUTH AT BEDTIME 90 tablet 1     metFORMIN (GLUCOPHAGE) 1000 MG tablet TAKE ONE TABLET BY MOUTH TWICE DAILY WITH MEALS 180 tablet 1     lisinopril (PRINIVIL/ZESTRIL) 20 MG tablet Take 1 tablet (20 mg) by mouth daily 90 tablet 1     VITAMIN D, CHOLECALCIFEROL, PO Take 1,000 Units by mouth daily       Cyanocobalamin (VITAMIN B 12 PO) Take 50 mcg by mouth daily       HYDROcodone-acetaminophen (NORCO) 5-325 MG per tablet Take 1-2 tablets by mouth every 4 hours as needed for other (Moderate to Severe Pain) 30 tablet 0     OXYCONTIN 10 MG 12 hr tablet Take 10 mg by mouth At Bedtime   0     gabapentin (NEURONTIN) 300 MG capsule Take 300 mg by mouth every evening        Multiple Vitamins-Minerals (SENIOR MULTIVITAMIN PLUS) TABS Take 1 tablet by mouth daily        aspirin 81 MG EC tablet Take 1 tablet by mouth daily. 90 tablet 3     pregabalin (LYRICA) 150 MG capsule Take 150 mg by mouth 3 times daily.           Reviewed and updated as needed this visit by clinical staff     Reviewed and updated as needed this visit by Provider       ROS:  C: NEGATIVE for fever, chills, change in weight  E/M: NEGATIVE for ear, mouth and throat problems  R: NEGATIVE for significant cough or SOB  CV: NEGATIVE for chest pain, palpitations or peripheral edema    OBJECTIVE:     /70 (BP Location: Right arm, Patient Position: Chair, Cuff Size: Adult Large)  Pulse 66  Temp 97.6  F (36.4  C) (Oral)  Wt 82.3 kg (181 lb 6.4 oz)  SpO2 96%  Breastfeeding? No  BMI 33.72 kg/m2  Body mass index is 33.72 kg/(m^2).   Regular rate and  rhythm. S1 and S2 normal, no murmurs, clicks, gallops or rubs. No edema or JVD.  "Chest is clear; no wheezes or rales.    Foot exam:  No callous  Peripheral pulses full- posterior tibial  No significant callous  No diabetic ulcers  Recent food surgery- minimal toe deformity  Diagnostic Test Results:  Results for orders placed or performed in visit on 12/28/17 (from the past 24 hour(s))   Hemoglobin A1c (BFP)   Result Value Ref Range    Hemoglobin A1C 6.4 4.0 - 7.0 %       ASSESSMENT/PLAN:     Problem List Items Addressed This Visit     Essential hypertension, benign    Hypothyroidism    Relevant Orders    TSH ([facility])    T4 free    Mixed hyperlipidemia    Type 2 diabetes mellitus with diabetic neuropathy (H) - Primary    Relevant Orders    Lipid Profile    BASIC METABOLIC PANEL ([facility])    VENOUS COLLECTION (Completed)    Hemoglobin A1c (BFP) (Completed)      Other Visit Diagnoses     Type 2 diabetes mellitus without complication, without long-term current use of insulin (H)        Peripheral polyneuropathy               BMI:   Estimated body mass index is 33.72 kg/(m^2) as calculated from the following:    Height as of 8/10/17: 1.562 m (5' 1.5\").    Weight as of this encounter: 82.3 kg (181 lb 6.4 oz).   Weight management plan: Discussed healthy diet and exercise guidelines and patient will follow up in 6 months in clinic to re-evaluate.        MEDICATIONS:    Levothyroxine dose decreased to 112 mcg- repeat TSH in 3-6 months  Continue other current medications without change  FUTURE APPOINTMENTS:       - Follow-up visit in 6 months      Judith Walker MD  [facility]  "

## 2017-12-28 NOTE — MR AVS SNAPSHOT
After Visit Summary   12/28/2017    Trisha Lucia    MRN: [id number]           Patient Information     Date Of Birth          1935        Visit Information        Provider Department      12/28/2017 10:30 AM Judith Walker MD [facility]        Today's Diagnoses     Type 2 diabetes mellitus with diabetic neuropathy, without long-term current use of insulin (H)    -  1    Hypothyroidism, unspecified type        Mixed hyperlipidemia        Type 2 diabetes mellitus without complication, without long-term current use of insulin (H)        Essential hypertension, benign        Peripheral polyneuropathy           Follow-ups after your visit        Who to contact     If you have questions or need follow up information about today's clinic visit or your schedule please contact [facility] directly at [phone].  Normal or non-critical lab and imaging results will be communicated to you by MyChart, letter or phone within 4 business days after the clinic has received the results. If you do not hear from us within 7 days, please contact the clinic through [employer] or phone. If you have a critical or abnormal lab result, we will notify you by phone as soon as possible.  Submit refill requests through [employer] or call your pharmacy and they will forward the refill request to us. Please allow 3 business days for your refill to be completed.          Additional Information About Your Visit        MyChart Information     [employer] gives you secure access to your electronic health record. If you see a primary care provider, you can also send messages to your care team and make appointments. If you have questions, please call your primary care clinic.  If you do not have a primary care provider, please call [phone] and they will assist you.        Care EveryWhere ID     This is your Care EveryWhere ID. This could be used by other organizations to access  your [place] medical records  [id number]        Your Vitals Were     Pulse Temperature Pulse Oximetry Breastfeeding? BMI (Body Mass Index)       66 97.6  F (36.4  C) (Oral) 96% No 33.72 kg/m2        Blood Pressure from Last 3 Encounters:   12/28/17 114/70   11/02/17 126/60   08/10/17 136/60    Weight from Last 3 Encounters:   12/28/17 82.3 kg (181 lb 6.4 oz)   11/02/17 81.9 kg (180 lb 9.6 oz)   08/10/17 82.1 kg (181 lb)              We Performed the Following     BASIC METABOLIC PANEL ([facility])     Hemoglobin A1c (BFP)     Lipid Profile     T4 free     TSH ([facility])     VENOUS COLLECTION          Today's Medication Changes          These changes are accurate as of: 12/28/17 11:59 PM.  If you have any questions, ask your nurse or doctor.               These medicines have changed or have updated prescriptions.        Dose/Directions    citalopram 20 MG tablet   Commonly known as:  celeXA   This may have changed:  See the new instructions.   Used for:  Peripheral polyneuropathy   Changed by:  Judith Walker MD        Dose:  20 mg   Take 1 tablet (20 mg) by mouth daily   Quantity:  90 tablet   Refills:  1       * levothyroxine 125 MCG tablet   Commonly known as:  SYNTHROID/LEVOTHROID   This may have changed:  Another medication with the same name was added. Make sure you understand how and when to take each.   Used for:  Hypothyroidism, unspecified type   Changed by:  Judith Walker MD        Dose:  125 mcg   Take 1 tablet (125 mcg) by mouth daily   Quantity:  30 tablet   Refills:  0       * levothyroxine 112 MCG tablet   Commonly known as:  SYNTHROID/LEVOTHROID   This may have changed:  You were already taking a medication with the same name, and this prescription was added. Make sure you understand how and when to take each.   Used for:  Hypothyroidism, unspecified type   Changed by:  Judith Walker MD        Dose:  112 mcg   Take 1 tablet (112 mcg) by mouth daily   Quantity:  90 tablet   Refills:  1        * Notice:  This list has 2 medication(s) that are the same as other medications prescribed for you. Read the directions carefully, and ask your doctor or other care provider to review them with you.         Where to get your medicines      These medications were sent to [id number] - [place], [place] - [address]  [address] [facility] [address]     Phone:  [phone]     citalopram 20 MG tablet    levothyroxine 112 MCG tablet    lisinopril 20 MG tablet    metFORMIN 1000 MG tablet    simvastatin 20 MG tablet                Primary Care Provider Office Phone # Fax #    Judith Walker [phone] [address] [facility] [address]  [facility] [address]        Equal Access to Services     [facility] : Hadii sen howe hadasho Sodajaali, waaxda luqadaha, qaybta kaalmada adeegyada, raul ocampo . So [facility] [phone].    ATENCIÓN: Si habla español, tiene a duarte disposición servicios gratuitos de asistencia lingüística. [facility] [phone].    We comply with applicable federal civil rights laws and [place] laws. We do not discriminate on the basis of race, color, national origin, age, disability, sex, sexual orientation, or gender identity.            Thank you!     Thank you for choosing [facility]  for your care. Our goal is always to provide you with excellent care. Hearing back from our patients is one way we can continue to improve our services. Please take a few minutes to complete the written survey that you may receive in the mail after your visit with us. Thank you!             Your Updated Medication List - Protect others around you: Learn how to safely use, store and throw away your medicines at www.disposemymeds.org.          This list is accurate as of: 12/28/17 11:59 PM.  Always use your most recent med list.                   Brand Name Dispense Instructions for use Diagnosis    aspirin 81 MG EC  tablet     90 tablet    Take 1 tablet by mouth daily.        citalopram 20 MG tablet    celeXA    90 tablet    Take 1 tablet (20 mg) by mouth daily    Peripheral polyneuropathy       gabapentin 300 MG capsule    NEURONTIN     Take 300 mg by mouth every evening        HYDROcodone-acetaminophen 5-325 MG per tablet    NORCO    30 tablet    Take 1-2 tablets by mouth every 4 hours as needed for other (Moderate to Severe Pain)    Ventral hernia without obstruction or gangrene       hyoscyamine 0.125 MG tablet    ANASPAZ/LEVSIN    30 tablet    Take 1 tablet (125 mcg) by mouth 4 times daily (before meals and nightly)    Irritable bowel syndrome, unspecified type       * levothyroxine 125 MCG tablet    SYNTHROID/LEVOTHROID    30 tablet    Take 1 tablet (125 mcg) by mouth daily    Hypothyroidism, unspecified type       * levothyroxine 112 MCG tablet    SYNTHROID/LEVOTHROID    90 tablet    Take 1 tablet (112 mcg) by mouth daily    Hypothyroidism, unspecified type       lisinopril 20 MG tablet    PRINIVIL/ZESTRIL    90 tablet    Take 1 tablet (20 mg) by mouth daily    Essential hypertension, benign, Type 2 diabetes mellitus with diabetic neuropathy, without long-term current use of insulin (H)       LYRICA 150 MG capsule   Generic drug:  pregabalin      Take 150 mg by mouth 3 times daily.        meclizine 25 MG tablet    ANTIVERT    30 tablet    TAKE ONE TABLET BY MOUTH EVERY SIX HOURS AS NEEDED FOR DIZZINESS    Vertigo, benign positional, unspecified laterality       metFORMIN 1000 MG tablet    GLUCOPHAGE    180 tablet    TAKE ONE TABLET BY MOUTH TWICE DAILY WITH MEALS    Type 2 diabetes mellitus without complication, without long-term current use of insulin (H)       OXYCONTIN 10 MG 12 hr tablet   Generic drug:  oxyCODONE      Take 10 mg by mouth At Bedtime        SENIOR MULTIVITAMIN PLUS Tabs      Take 1 tablet by mouth daily        simvastatin 20 MG tablet    ZOCOR    90 tablet    TAKE 1 TABLET (20 MG) BY MOUTH AT BEDTIME     Mixed hyperlipidemia, Type 2 diabetes mellitus with diabetic neuropathy, without long-term current use of insulin (H)       VITAMIN B 12 PO      Take 50 mcg by mouth daily        VITAMIN D (CHOLECALCIFEROL) PO      Take 1,000 Units by mouth daily        * Notice:  This list has 2 medication(s) that are the same as other medications prescribed for you. Read the directions carefully, and ask your doctor or other care provider to review them with you.

## 2017-12-29 LAB
BUN SERPL-MCNC: 23 MG/DL (ref 7–25)
BUN/CREATININE RATIO: ABNORMAL (CALC) (ref 6–22)
CALCIUM SERPL-MCNC: 9.2 MG/DL (ref 8.6–10.4)
CHLORIDE SERPLBLD-SCNC: 107 MMOL/L (ref 98–110)
CHOLEST SERPL-MCNC: 134 MG/DL
CHOLEST/HDLC SERPL: 3.4 (CALC)
CO2 SERPL-SCNC: 26 MMOL/L (ref 20–31)
CREAT SERPL-MCNC: 0.63 MG/DL (ref 0.6–0.88)
EGFR AFRICAN AMERICAN - QUEST: 97 ML/MIN/1.73M2
GFR SERPL CREATININE-BSD FRML MDRD: 84 ML/MIN/1.73M2
GLUCOSE - QUEST: 114 MG/DL (ref 65–99)
HDLC SERPL-MCNC: 40 MG/DL
LDLC SERPL CALC-MCNC: 76 MG/DL (CALC)
NONHDLC SERPL-MCNC: 94 MG/DL (CALC)
POTASSIUM SERPL-SCNC: 4.2 MMOL/L (ref 3.5–5.3)
SODIUM SERPL-SCNC: 141 MMOL/L (ref 135–146)
T4, FREE, NON-DIALYSIS - QUEST: 1.6 NG/DL (ref 0.8–1.8)
TRIGL SERPL-MCNC: 94 MG/DL
TSH SERPL-ACNC: 0.28 MIU/L (ref 0.4–4.5)

## 2017-12-30 DIAGNOSIS — E03.9 HYPOTHYROIDISM, UNSPECIFIED TYPE: ICD-10-CM

## 2018-01-01 RX ORDER — LISINOPRIL 20 MG/1
20 TABLET ORAL DAILY
Qty: 90 TABLET | Refills: 1 | Status: SHIPPED | OUTPATIENT
Start: 2018-01-01 | End: 2018-08-01

## 2018-01-01 RX ORDER — LEVOTHYROXINE SODIUM 112 UG/1
112 TABLET ORAL DAILY
Qty: 90 TABLET | Refills: 1 | Status: SHIPPED | OUTPATIENT
Start: 2018-01-01 | End: 2018-06-27

## 2018-01-01 RX ORDER — SIMVASTATIN 20 MG
TABLET ORAL
Qty: 90 TABLET | Refills: 3 | Status: SHIPPED | OUTPATIENT
Start: 2018-01-01 | End: 2018-12-08

## 2018-01-01 RX ORDER — CITALOPRAM HYDROBROMIDE 20 MG/1
20 TABLET ORAL DAILY
Qty: 90 TABLET | Refills: 1 | Status: SHIPPED | OUTPATIENT
Start: 2018-01-01 | End: 2018-07-08

## 2018-01-02 RX ORDER — LEVOTHYROXINE SODIUM 125 UG/1
TABLET ORAL
Qty: 30 TABLET | Refills: 0 | OUTPATIENT
Start: 2018-01-02

## 2018-02-06 ENCOUNTER — ALLIED HEALTH/NURSE VISIT (OUTPATIENT)
Dept: FAMILY MEDICINE | Facility: CLINIC | Age: 83
End: 2018-02-06

## 2018-02-06 DIAGNOSIS — D62 ACUTE POSTHEMORRHAGIC ANEMIA: Primary | ICD-10-CM

## 2018-02-06 PROCEDURE — 96372 THER/PROPH/DIAG INJ SC/IM: CPT | Performed by: FAMILY MEDICINE

## 2018-02-06 NOTE — MR AVS SNAPSHOT
After Visit Summary   2/6/2018    Trisha Lucia    MRN: [id number]           Patient Information     Date Of Birth          1935        Visit Information        Provider Department      2/6/2018 3:00 PM Judith Walker MD [facility]        Today's Diagnoses     Acute posthemorrhagic anemia    -  1       Follow-ups after your visit        Who to contact     If you have questions or need follow up information about today's clinic visit or your schedule please contact [facility] directly at [phone].  Normal or non-critical lab and imaging results will be communicated to you by [employer], letter or phone within 4 business days after the clinic has received the results. If you do not hear from us within 7 days, please contact the clinic through [employer] or phone. If you have a critical or abnormal lab result, we will notify you by phone as soon as possible.  Submit refill requests through y prime or call your pharmacy and they will forward the refill request to us. Please allow 3 business days for your refill to be completed.          Additional Information About Your Visit        MyChart Information     y prime gives you secure access to your electronic health record. If you see a primary care provider, you can also send messages to your care team and make appointments. If you have questions, please call your primary care clinic.  If you do not have a primary care provider, please call [phone] and they will assist you.        Care EveryWhere ID     This is your Care EveryWhere ID. This could be used by other organizations to access your [place] medical records  [id number]         Blood Pressure from Last 3 Encounters:   12/28/17 114/70   11/02/17 126/60   08/10/17 136/60    Weight from Last 3 Encounters:   12/28/17 82.3 kg (181 lb 6.4 oz)   11/02/17 81.9 kg (180 lb 9.6 oz)   08/10/17 82.1 kg (181 lb)              We Performed the  Following     VITAMIN B12 INJ /1000MCG        Primary Care Provider Office Phone # Fax #    Juidth Walker [phone] [address] [facility] [address]  [facility] [address]        Equal Access to Services     ROSA ISBELL : Hadii sen ku hadkimo Soomaali, waaxda luqadaha, qaybta kaalmada adeegyada, raul ronquillon janelle ny laSylviacristina . So [facility] [phone].    ATENCIÓN: Si habla español, tiene a duarte disposición servicios gratuitos de asistencia lingüística. Llame al [phone].    We comply with applicable federal civil rights laws and [place] laws. We do not discriminate on the basis of race, color, national origin, age, disability, sex, sexual orientation, or gender identity.            Thank you!     Thank you for choosing [facility]  for your care. Our goal is always to provide you with excellent care. Hearing back from our patients is one way we can continue to improve our services. Please take a few minutes to complete the written survey that you may receive in the mail after your visit with us. Thank you!             Your Updated Medication List - Protect others around you: Learn how to safely use, store and throw away your medicines at www.disposemymeds.org.          This list is accurate as of 2/6/18  3:43 PM.  Always use your most recent med list.                   Brand Name Dispense Instructions for use Diagnosis    aspirin 81 MG EC tablet     90 tablet    Take 1 tablet by mouth daily.        citalopram 20 MG tablet    celeXA    90 tablet    Take 1 tablet (20 mg) by mouth daily    Peripheral polyneuropathy       gabapentin 300 MG capsule    NEURONTIN     Take 300 mg by mouth every evening        HYDROcodone-acetaminophen 5-325 MG per tablet    NORCO    30 tablet    Take 1-2 tablets by mouth every 4 hours as needed for other (Moderate to Severe Pain)    Ventral hernia without obstruction or gangrene       hyoscyamine 0.125 MG tablet    ANASPAZ/LEVSIN     30 tablet    Take 1 tablet (125 mcg) by mouth 4 times daily (before meals and nightly)    Irritable bowel syndrome, unspecified type       * levothyroxine 125 MCG tablet    SYNTHROID/LEVOTHROID    30 tablet    Take 1 tablet (125 mcg) by mouth daily    Hypothyroidism, unspecified type       * levothyroxine 112 MCG tablet    SYNTHROID/LEVOTHROID    90 tablet    Take 1 tablet (112 mcg) by mouth daily    Hypothyroidism, unspecified type       lisinopril 20 MG tablet    PRINIVIL/ZESTRIL    90 tablet    Take 1 tablet (20 mg) by mouth daily    Essential hypertension, benign, Type 2 diabetes mellitus with diabetic neuropathy, without long-term current use of insulin (H)       LYRICA 150 MG capsule   Generic drug:  pregabalin      Take 150 mg by mouth 3 times daily.        meclizine 25 MG tablet    ANTIVERT    30 tablet    TAKE ONE TABLET BY MOUTH EVERY SIX HOURS AS NEEDED FOR DIZZINESS    Vertigo, benign positional, unspecified laterality       metFORMIN 1000 MG tablet    GLUCOPHAGE    180 tablet    TAKE ONE TABLET BY MOUTH TWICE DAILY WITH MEALS    Type 2 diabetes mellitus without complication, without long-term current use of insulin (H)       OXYCONTIN 10 MG 12 hr tablet   Generic drug:  oxyCODONE      Take 10 mg by mouth At Bedtime        SENIOR MULTIVITAMIN PLUS Tabs      Take 1 tablet by mouth daily        simvastatin 20 MG tablet    ZOCOR    90 tablet    TAKE 1 TABLET (20 MG) BY MOUTH AT BEDTIME    Mixed hyperlipidemia, Type 2 diabetes mellitus with diabetic neuropathy, without long-term current use of insulin (H)       VITAMIN B 12 PO      Take 50 mcg by mouth daily        VITAMIN D (CHOLECALCIFEROL) PO      Take 1,000 Units by mouth daily        * Notice:  This list has 2 medication(s) that are the same as other medications prescribed for you. Read the directions carefully, and ask your doctor or other care provider to review them with you.

## 2018-02-06 NOTE — NURSING NOTE
The following medication was given:     MEDICATION: Vitamin B12  1000 mcg  ROUTE: IM  SITE: Deltoid - Left  DOSE: 1 ml  LOT #: [id number]  :  Darius Ascencio  EXPIRATION DATE:  05/2019  NDC#: 1896-6026-76

## 2018-03-14 ENCOUNTER — ALLIED HEALTH/NURSE VISIT (OUTPATIENT)
Dept: FAMILY MEDICINE | Facility: CLINIC | Age: 83
End: 2018-03-14

## 2018-03-14 DIAGNOSIS — E53.9 B-COMPLEX DEFICIENCY: Primary | ICD-10-CM

## 2018-03-14 PROCEDURE — 96372 THER/PROPH/DIAG INJ SC/IM: CPT | Performed by: FAMILY MEDICINE

## 2018-03-14 NOTE — NURSING NOTE
Trisha Lucia is here today for her Vitamin B12 Injection.    The following medication was given:     MEDICATION: Vitamin B12  1,000mcg  ROUTE: IM  SITE: Deltoid - Right  DOSE: 1mL  LOT #: [id number]  :  [employer]  EXPIRATION DATE:  05/2019  NDC#: 3407-3377-29    Administered by: Noelle Guerrero CMA

## 2018-03-14 NOTE — MR AVS SNAPSHOT
After Visit Summary   3/14/2018    Trisha Lucia    MRN: [id number]           Patient Information     Date Of Birth          1935        Visit Information        Provider Department      3/14/2018 3:30 PM Judith Walker MD [facility]        Today's Diagnoses     B-complex deficiency    -  1       Follow-ups after your visit        Who to contact     If you have questions or need follow up information about today's clinic visit or your schedule please contact [facility] directly at [phone].  Normal or non-critical lab and imaging results will be communicated to you by [employer]art, letter or phone within 4 business days after the clinic has received the results. If you do not hear from us within 7 days, please contact the clinic through [employer] or phone. If you have a critical or abnormal lab result, we will notify you by phone as soon as possible.  Submit refill requests through [employer] or call your pharmacy and they will forward the refill request to us. Please allow 3 business days for your refill to be completed.          Additional Information About Your Visit        MyChart Information     [employer] gives you secure access to your electronic health record. If you see a primary care provider, you can also send messages to your care team and make appointments. If you have questions, please call your primary care clinic.  If you do not have a primary care provider, please call [phone] and they will assist you.        Care EveryWhere ID     This is your Care EveryWhere ID. This could be used by other organizations to access your [place] medical records  [id number]         Blood Pressure from Last 3 Encounters:   12/28/17 114/70   11/02/17 126/60   08/10/17 136/60    Weight from Last 3 Encounters:   12/28/17 82.3 kg (181 lb 6.4 oz)   11/02/17 81.9 kg (180 lb 9.6 oz)   08/10/17 82.1 kg (181 lb)              We Performed the Following      VITAMIN B12 INJ /1000G        Primary Care Provider Office Phone # Fax #    Judith Walker [phone] [address] [address]        Equal Access to Services     ROSA ISBELL : Hadii sen ku hadkimo Soomaali, waaxda luqadaha, qaybta kaalmada adeegyada, raul ronquillon janelle ny lacarmendov leonard. So [facility] [phone].    ATENCIÓN: Si habla español, tiene a duarte disposición servicios gratuitos de asistencia lingüística. Llame al [phone].    We comply with applicable federal civil rights laws and [place] laws. We do not discriminate on the basis of race, color, national origin, age, disability, sex, sexual orientation, or gender identity.            Thank you!     Thank you for choosing [facility]  for your care. Our goal is always to provide you with excellent care. Hearing back from our patients is one way we can continue to improve our services. Please take a few minutes to complete the written survey that you may receive in the mail after your visit with us. Thank you!             Your Updated Medication List - Protect others around you: Learn how to safely use, store and throw away your medicines at www.disposemymeds.org.          This list is accurate as of 3/14/18  5:45 PM.  Always use your most recent med list.                   Brand Name Dispense Instructions for use Diagnosis    aspirin 81 MG EC tablet     90 tablet    Take 1 tablet by mouth daily.        citalopram 20 MG tablet    celeXA    90 tablet    Take 1 tablet (20 mg) by mouth daily    Peripheral polyneuropathy       gabapentin 300 MG capsule    NEURONTIN     Take 300 mg by mouth every evening        HYDROcodone-acetaminophen 5-325 MG per tablet    NORCO    30 tablet    Take 1-2 tablets by mouth every 4 hours as needed for other (Moderate to Severe Pain)    Ventral hernia without obstruction or gangrene       hyoscyamine 0.125 MG tablet    ANASPAZ/LEVSIN    30  tablet    Take 1 tablet (125 mcg) by mouth 4 times daily (before meals and nightly)    Irritable bowel syndrome, unspecified type       * levothyroxine 125 MCG tablet    SYNTHROID/LEVOTHROID    30 tablet    Take 1 tablet (125 mcg) by mouth daily    Hypothyroidism, unspecified type       * levothyroxine 112 MCG tablet    SYNTHROID/LEVOTHROID    90 tablet    Take 1 tablet (112 mcg) by mouth daily    Hypothyroidism, unspecified type       lisinopril 20 MG tablet    PRINIVIL/ZESTRIL    90 tablet    Take 1 tablet (20 mg) by mouth daily    Essential hypertension, benign, Type 2 diabetes mellitus with diabetic neuropathy, without long-term current use of insulin (H)       LYRICA 150 MG capsule   Generic drug:  pregabalin      Take 150 mg by mouth 3 times daily.        meclizine 25 MG tablet    ANTIVERT    30 tablet    TAKE ONE TABLET BY MOUTH EVERY SIX HOURS AS NEEDED FOR DIZZINESS    Vertigo, benign positional, unspecified laterality       metFORMIN 1000 MG tablet    GLUCOPHAGE    180 tablet    TAKE ONE TABLET BY MOUTH TWICE DAILY WITH MEALS    Type 2 diabetes mellitus without complication, without long-term current use of insulin (H)       OXYCONTIN 10 MG 12 hr tablet   Generic drug:  oxyCODONE      Take 10 mg by mouth At Bedtime        SENIOR MULTIVITAMIN PLUS Tabs      Take 1 tablet by mouth daily        simvastatin 20 MG tablet    ZOCOR    90 tablet    TAKE 1 TABLET (20 MG) BY MOUTH AT BEDTIME    Mixed hyperlipidemia, Type 2 diabetes mellitus with diabetic neuropathy, without long-term current use of insulin (H)       VITAMIN B 12 PO      Take 50 mcg by mouth daily        VITAMIN D (CHOLECALCIFEROL) PO      Take 1,000 Units by mouth daily        * Notice:  This list has 2 medication(s) that are the same as other medications prescribed for you. Read the directions carefully, and ask your doctor or other care provider to review them with you.

## 2018-04-18 ENCOUNTER — ALLIED HEALTH/NURSE VISIT (OUTPATIENT)
Dept: FAMILY MEDICINE | Facility: CLINIC | Age: 83
End: 2018-04-18

## 2018-04-18 DIAGNOSIS — E53.9 B-COMPLEX DEFICIENCY: Primary | ICD-10-CM

## 2018-04-18 PROCEDURE — 96372 THER/PROPH/DIAG INJ SC/IM: CPT | Performed by: FAMILY MEDICINE

## 2018-04-18 NOTE — MR AVS SNAPSHOT
After Visit Summary   4/18/2018    Trisha Lucia    MRN: [id number]           Patient Information     Date Of Birth          1935        Visit Information        Provider Department      4/18/2018 2:30 PM Judith Walker MD [facility]        Today's Diagnoses     B-complex deficiency    -  1       Follow-ups after your visit        Who to contact     If you have questions or need follow up information about today's clinic visit or your schedule please contact [facility] directly at [phone].  Normal or non-critical lab and imaging results will be communicated to you by [employer], letter or phone within 4 business days after the clinic has received the results. If you do not hear from us within 7 days, please contact the clinic through [employer] or phone. If you have a critical or abnormal lab result, we will notify you by phone as soon as possible.  Submit refill requests through [employer] or call your pharmacy and they will forward the refill request to us. Please allow 3 business days for your refill to be completed.          Additional Information About Your Visit        MyChart Information     [employer] gives you secure access to your electronic health record. If you see a primary care provider, you can also send messages to your care team and make appointments. If you have questions, please call your primary care clinic.  If you do not have a primary care provider, please call [phone] and they will assist you.        Care EveryWhere ID     This is your Care EveryWhere ID. This could be used by other organizations to access your [place] medical records  [id number]         Blood Pressure from Last 3 Encounters:   12/28/17 114/70   11/02/17 126/60   08/10/17 136/60    Weight from Last 3 Encounters:   12/28/17 82.3 kg (181 lb 6.4 oz)   11/02/17 81.9 kg (180 lb 9.6 oz)   08/10/17 82.1 kg (181 lb)              We Performed the Following      VITAMIN B12 INJ /1000G        Primary Care Provider Office Phone # Fax #    Judith Walker [phone] [address] [address]        Equal Access to Services     ROSA ISBELL : Hadii sen ku hadkimo Soomaali, waaxda luqadaha, qaybta kaalmada adeegyada, raul ronquillon janelle ny lacarmendov leonard. So [facility] [phone].    ATENCIÓN: Si habla español, tiene a duarte disposición servicios gratuitos de asistencia lingüística. Llame al [phone].    We comply with applicable federal civil rights laws and [place] laws. We do not discriminate on the basis of race, color, national origin, age, disability, sex, sexual orientation, or gender identity.            Thank you!     Thank you for choosing [facility]  for your care. Our goal is always to provide you with excellent care. Hearing back from our patients is one way we can continue to improve our services. Please take a few minutes to complete the written survey that you may receive in the mail after your visit with us. Thank you!             Your Updated Medication List - Protect others around you: Learn how to safely use, store and throw away your medicines at www.disposemymeds.org.          This list is accurate as of 4/18/18  2:34 PM.  Always use your most recent med list.                   Brand Name Dispense Instructions for use Diagnosis    aspirin 81 MG EC tablet     90 tablet    Take 1 tablet by mouth daily.        citalopram 20 MG tablet    celeXA    90 tablet    Take 1 tablet (20 mg) by mouth daily    Peripheral polyneuropathy       gabapentin 300 MG capsule    NEURONTIN     Take 300 mg by mouth every evening        HYDROcodone-acetaminophen 5-325 MG per tablet    NORCO    30 tablet    Take 1-2 tablets by mouth every 4 hours as needed for other (Moderate to Severe Pain)    Ventral hernia without obstruction or gangrene       hyoscyamine 0.125 MG tablet    ANASPAZ/LEVSIN    30  tablet    Take 1 tablet (125 mcg) by mouth 4 times daily (before meals and nightly)    Irritable bowel syndrome, unspecified type       * levothyroxine 125 MCG tablet    SYNTHROID/LEVOTHROID    30 tablet    Take 1 tablet (125 mcg) by mouth daily    Hypothyroidism, unspecified type       * levothyroxine 112 MCG tablet    SYNTHROID/LEVOTHROID    90 tablet    Take 1 tablet (112 mcg) by mouth daily    Hypothyroidism, unspecified type       lisinopril 20 MG tablet    PRINIVIL/ZESTRIL    90 tablet    Take 1 tablet (20 mg) by mouth daily    Essential hypertension, benign, Type 2 diabetes mellitus with diabetic neuropathy, without long-term current use of insulin (H)       LYRICA 150 MG capsule   Generic drug:  pregabalin      Take 150 mg by mouth 3 times daily.        meclizine 25 MG tablet    ANTIVERT    30 tablet    TAKE ONE TABLET BY MOUTH EVERY SIX HOURS AS NEEDED FOR DIZZINESS    Vertigo, benign positional, unspecified laterality       metFORMIN 1000 MG tablet    GLUCOPHAGE    180 tablet    TAKE ONE TABLET BY MOUTH TWICE DAILY WITH MEALS    Type 2 diabetes mellitus without complication, without long-term current use of insulin (H)       OXYCONTIN 10 MG 12 hr tablet   Generic drug:  oxyCODONE      Take 10 mg by mouth At Bedtime        SENIOR MULTIVITAMIN PLUS Tabs      Take 1 tablet by mouth daily        simvastatin 20 MG tablet    ZOCOR    90 tablet    TAKE 1 TABLET (20 MG) BY MOUTH AT BEDTIME    Mixed hyperlipidemia, Type 2 diabetes mellitus with diabetic neuropathy, without long-term current use of insulin (H)       VITAMIN B 12 PO      Take 50 mcg by mouth daily        VITAMIN D (CHOLECALCIFEROL) PO      Take 1,000 Units by mouth daily        * Notice:  This list has 2 medication(s) that are the same as other medications prescribed for you. Read the directions carefully, and ask your doctor or other care provider to review them with you.

## 2018-04-18 NOTE — NURSING NOTE
Trisha Lucia is here today for her Vitamin B12 shot today.    The following medication was given:     MEDICATION: Vitamin B12  1,000mcg  ROUTE: IM  SITE: Deltoid - Left  DOSE: 1 mL  LOT #: [id number]  :  [employer]  EXPIRATION DATE:  5/2019  NDC#: 3525-0059-36    Administered by: Noelle Guerrero CMA

## 2018-04-20 ENCOUNTER — HEALTH MAINTENANCE LETTER (OUTPATIENT)
Age: 83
End: 2018-04-20

## 2018-05-16 ENCOUNTER — TRANSFERRED RECORDS (OUTPATIENT)
Dept: FAMILY MEDICINE | Facility: CLINIC | Age: 83
End: 2018-05-16

## 2018-05-16 ENCOUNTER — ALLIED HEALTH/NURSE VISIT (OUTPATIENT)
Dept: FAMILY MEDICINE | Facility: CLINIC | Age: 83
End: 2018-05-16

## 2018-05-16 DIAGNOSIS — E53.9 B-COMPLEX DEFICIENCY: Primary | ICD-10-CM

## 2018-05-16 PROCEDURE — 96372 THER/PROPH/DIAG INJ SC/IM: CPT | Performed by: FAMILY MEDICINE

## 2018-05-16 NOTE — MR AVS SNAPSHOT
After Visit Summary   5/16/2018    Trisha Lucia    MRN: [id number]           Patient Information     Date Of Birth          1935        Visit Information        Provider Department      5/16/2018 3:30 PM Judith Walker MD [facility]        Today's Diagnoses     B-complex deficiency    -  1       Follow-ups after your visit        Who to contact     If you have questions or need follow up information about today's clinic visit or your schedule please contact [facility] directly at [phone].  Normal or non-critical lab and imaging results will be communicated to you by ActivIdentityhart, letter or phone within 4 business days after the clinic has received the results. If you do not hear from us within 7 days, please contact the clinic through ActivIdentityhart or phone. If you have a critical or abnormal lab result, we will notify you by phone as soon as possible.  Submit refill requests through [employer] or call your pharmacy and they will forward the refill request to us. Please allow 3 business days for your refill to be completed.          Additional Information About Your Visit        MyChart Information     [employer] gives you secure access to your electronic health record. If you see a primary care provider, you can also send messages to your care team and make appointments. If you have questions, please call your primary care clinic.  If you do not have a primary care provider, please call [phone] and they will assist you.        Care EveryWhere ID     This is your Care EveryWhere ID. This could be used by other organizations to access your [place] medical records  [id number]         Blood Pressure from Last 3 Encounters:   12/28/17 114/70   11/02/17 126/60   08/10/17 136/60    Weight from Last 3 Encounters:   12/28/17 82.3 kg (181 lb 6.4 oz)   11/02/17 81.9 kg (180 lb 9.6 oz)   08/10/17 82.1 kg (181 lb)              We Performed the Following      VITAMIN B12 INJ /1000G        Primary Care Provider Office Phone # Fax #    Judith Walker [phone] [address] [address]        Equal Access to Services     ROSA ISBELL : Hadii sen ku hadkimo Soomaali, waaxda luqadaha, qaybta kaalmada adeegyada, raul ronquillon janelle ny lacarmendov leonard. So [facility] [phone].    ATENCIÓN: Si habla español, tiene a duarte disposición servicios gratuitos de asistencia lingüística. Llame al [phone].    We comply with applicable federal civil rights laws and [place] laws. We do not discriminate on the basis of race, color, national origin, age, disability, sex, sexual orientation, or gender identity.            Thank you!     Thank you for choosing [facility]  for your care. Our goal is always to provide you with excellent care. Hearing back from our patients is one way we can continue to improve our services. Please take a few minutes to complete the written survey that you may receive in the mail after your visit with us. Thank you!             Your Updated Medication List - Protect others around you: Learn how to safely use, store and throw away your medicines at www.disposemymeds.org.          This list is accurate as of 5/16/18  3:40 PM.  Always use your most recent med list.                   Brand Name Dispense Instructions for use Diagnosis    aspirin 81 MG EC tablet     90 tablet    Take 1 tablet by mouth daily.        citalopram 20 MG tablet    celeXA    90 tablet    Take 1 tablet (20 mg) by mouth daily    Peripheral polyneuropathy       gabapentin 300 MG capsule    NEURONTIN     Take 300 mg by mouth every evening        HYDROcodone-acetaminophen 5-325 MG per tablet    NORCO    30 tablet    Take 1-2 tablets by mouth every 4 hours as needed for other (Moderate to Severe Pain)    Ventral hernia without obstruction or gangrene       hyoscyamine 0.125 MG tablet    ANASPAZ/LEVSIN    30  tablet    Take 1 tablet (125 mcg) by mouth 4 times daily (before meals and nightly)    Irritable bowel syndrome, unspecified type       * levothyroxine 125 MCG tablet    SYNTHROID/LEVOTHROID    30 tablet    Take 1 tablet (125 mcg) by mouth daily    Hypothyroidism, unspecified type       * levothyroxine 112 MCG tablet    SYNTHROID/LEVOTHROID    90 tablet    Take 1 tablet (112 mcg) by mouth daily    Hypothyroidism, unspecified type       lisinopril 20 MG tablet    PRINIVIL/ZESTRIL    90 tablet    Take 1 tablet (20 mg) by mouth daily    Essential hypertension, benign, Type 2 diabetes mellitus with diabetic neuropathy, without long-term current use of insulin (H)       LYRICA 150 MG capsule   Generic drug:  pregabalin      Take 150 mg by mouth 3 times daily.        meclizine 25 MG tablet    ANTIVERT    30 tablet    TAKE ONE TABLET BY MOUTH EVERY SIX HOURS AS NEEDED FOR DIZZINESS    Vertigo, benign positional, unspecified laterality       metFORMIN 1000 MG tablet    GLUCOPHAGE    180 tablet    TAKE ONE TABLET BY MOUTH TWICE DAILY WITH MEALS    Type 2 diabetes mellitus without complication, without long-term current use of insulin (H)       OXYCONTIN 10 MG 12 hr tablet   Generic drug:  oxyCODONE      Take 10 mg by mouth At Bedtime        SENIOR MULTIVITAMIN PLUS Tabs      Take 1 tablet by mouth daily        simvastatin 20 MG tablet    ZOCOR    90 tablet    TAKE 1 TABLET (20 MG) BY MOUTH AT BEDTIME    Mixed hyperlipidemia, Type 2 diabetes mellitus with diabetic neuropathy, without long-term current use of insulin (H)       VITAMIN B 12 PO      Take 50 mcg by mouth daily        VITAMIN D (CHOLECALCIFEROL) PO      Take 1,000 Units by mouth daily        * Notice:  This list has 2 medication(s) that are the same as other medications prescribed for you. Read the directions carefully, and ask your doctor or other care provider to review them with you.

## 2018-05-16 NOTE — NURSING NOTE
Trisha Lucia is here for her Vitamin B12 Injection Today.    The following medication was given:     MEDICATION: Vitamin B12  1,000mcg  ROUTE: IM  SITE: Deltoid - Right  DOSE: 1 ML  LOT #: [id number]  :  ModuleQ  EXPIRATION DATE:  05/2019  NDC#: 9877-0295-69    Administered by: Noelle Guerrero CMA

## 2018-06-01 ENCOUNTER — HEALTH MAINTENANCE LETTER (OUTPATIENT)
Age: 83
End: 2018-06-01

## 2018-06-13 ENCOUNTER — OFFICE VISIT (OUTPATIENT)
Dept: FAMILY MEDICINE | Facility: CLINIC | Age: 83
End: 2018-06-13

## 2018-06-13 VITALS
RESPIRATION RATE: 20 BRPM | TEMPERATURE: 98.3 F | BODY MASS INDEX: 33.86 KG/M2 | HEART RATE: 48 BPM | DIASTOLIC BLOOD PRESSURE: 72 MMHG | SYSTOLIC BLOOD PRESSURE: 134 MMHG | WEIGHT: 184 LBS | HEIGHT: 62 IN

## 2018-06-13 DIAGNOSIS — F43.23 ADJUSTMENT DISORDER WITH MIXED ANXIETY AND DEPRESSED MOOD: ICD-10-CM

## 2018-06-13 DIAGNOSIS — E11.40 TYPE 2 DIABETES MELLITUS WITH DIABETIC NEUROPATHY, WITHOUT LONG-TERM CURRENT USE OF INSULIN (H): ICD-10-CM

## 2018-06-13 DIAGNOSIS — E03.9 HYPOTHYROIDISM, UNSPECIFIED TYPE: ICD-10-CM

## 2018-06-13 DIAGNOSIS — R79.89 LOW VITAMIN B12 LEVEL: ICD-10-CM

## 2018-06-13 DIAGNOSIS — R53.81 MALAISE AND FATIGUE: ICD-10-CM

## 2018-06-13 DIAGNOSIS — R53.83 MALAISE AND FATIGUE: ICD-10-CM

## 2018-06-13 LAB
ALBUMIN URINE MG/G CR: 30 MG/G CREATININE
ALBUMIN URINE MG/SPEC: 10
CREATININE URINE: 100
ERYTHROCYTE [DISTWIDTH] IN BLOOD BY AUTOMATED COUNT: 12.5 %
HBA1C MFR BLD: 6.4 % (ref 4–7)
HCT VFR BLD AUTO: 36.8 % (ref 35–47)
HEMOGLOBIN: 12.3 G/DL (ref 11.7–15.7)
MCH RBC QN AUTO: 29.9 PG (ref 26–33)
MCHC RBC AUTO-ENTMCNC: 33.4 G/DL (ref 31–36)
MCV RBC AUTO: 89.2 FL (ref 78–100)
PLATELET COUNT - QUEST: 182 10^9/L (ref 150–375)
RBC # BLD AUTO: 4.12 10*12/L (ref 3.8–5.2)
WBC # BLD AUTO: 5.9 10*9/L (ref 4–11)

## 2018-06-13 PROCEDURE — 84439 ASSAY OF FREE THYROXINE: CPT | Mod: 90 | Performed by: FAMILY MEDICINE

## 2018-06-13 PROCEDURE — 82306 VITAMIN D 25 HYDROXY: CPT | Mod: 90 | Performed by: FAMILY MEDICINE

## 2018-06-13 PROCEDURE — 36415 COLL VENOUS BLD VENIPUNCTURE: CPT | Performed by: FAMILY MEDICINE

## 2018-06-13 PROCEDURE — 99214 OFFICE O/P EST MOD 30 MIN: CPT | Performed by: FAMILY MEDICINE

## 2018-06-13 PROCEDURE — 82043 UR ALBUMIN QUANTITATIVE: CPT | Performed by: FAMILY MEDICINE

## 2018-06-13 PROCEDURE — 93000 ELECTROCARDIOGRAM COMPLETE: CPT | Performed by: FAMILY MEDICINE

## 2018-06-13 PROCEDURE — 85027 COMPLETE CBC AUTOMATED: CPT | Performed by: FAMILY MEDICINE

## 2018-06-13 PROCEDURE — 83036 HEMOGLOBIN GLYCOSYLATED A1C: CPT | Mod: 90 | Performed by: FAMILY MEDICINE

## 2018-06-13 PROCEDURE — 84443 ASSAY THYROID STIM HORMONE: CPT | Mod: 90 | Performed by: FAMILY MEDICINE

## 2018-06-13 PROCEDURE — 80053 COMPREHEN METABOLIC PANEL: CPT | Mod: 90 | Performed by: FAMILY MEDICINE

## 2018-06-13 PROCEDURE — 82607 VITAMIN B-12: CPT | Mod: 90 | Performed by: FAMILY MEDICINE

## 2018-06-13 NOTE — LETTER
June 18, 2018      Trisha Lucia  [address] [place] [address]        Dear ,    We are writing to inform you of your test results.        I called the number we have in your chart to discuss your lab results:[phone]   I am sending them via MY CHART as an alterative-   Your thyroid labs are now well in the normal range- continue your current dose of Levothyroxine-   Vitamin D is normal- recommended dose is 1000 IU daily   Hemoglobin A1c shows good diabetis control at 6.4   Urine microalbumin is normal   Blood count including hemoglobin is Normal.   Kidney and liver function and electrolytes are Normal-   B12- is in the low normal range- Dr Bennett would like to see the result above 400-   I recommend trying 2500 mg SUBLINGUAL tablets- one a day under your tongue-   Repeat the B12 in one month while you monitor your symptoms to see if treating your low B12 makes you feel better.   B12 sublingual tablets are available over the counter- your pharmacist can help you find the medication-        Resulted Orders   TSH ([facility])   Result Value Ref Range    TSH 0.89 0.40 - 4.50 mIU/L   T4 free ([facility])   Result Value Ref Range    T4 Free, Non-Dialysis 1.4 0.8 - 1.8 ng/dL   T3 uptake ([facility])   Result Value Ref Range    T3 Uptake 34 22 - 35 %   VITAMIN B12 ([facility])   Result Value Ref Range    Vitamin B12 257 200 - 1100 pg/mL      Comment:         Please Note: Although the reference range for vitamin  B12 is 200-1100 pg/mL, it has been reported that between  5 and 10% of patients with values between 200 and 400  pg/mL may experience neuropsychiatric and hematologic  abnormalities due to occult B12 deficiency; less than 1%  of patients with values above 400 pg/mL will have symptoms.        Hemoglobin A1c (BFP)   Result Value Ref Range    Hemoglobin A1C 6.4 4.0 - 7.0 %   Comprehensive metabolic panel   Result Value Ref Range    Glucose 103 (H) 65 - 99 mg/dL      Comment:                    Fasting reference  interval     For someone without known diabetes, a glucose value  between 100 and 125 mg/dL is consistent with  prediabetes and should be confirmed with a  follow-up test.         Urea Nitrogen 17 7 - 25 mg/dL    Creatinine 0.63 0.60 - 0.88 mg/dL      Comment:      For patients >49 years of age, the reference limit  for Creatinine is approximately 13% higher for people  identified as -American.         GFR Estimate 84 > OR = 60 mL/min/1.73m2    EGFR  97 > OR = 60 mL/min/1.73m2    BUN/Creatinine Ratio NOT APPLICABLE 6 - 22 (calc)    Sodium 137 135 - 146 mmol/L    Potassium 4.3 3.5 - 5.3 mmol/L    Chloride 104 98 - 110 mmol/L    Carbon Dioxide 24 20 - 31 mmol/L    Calcium 9.2 8.6 - 10.4 mg/dL    Protein Total 7.2 6.1 - 8.1 g/dL    Albumin 4.1 3.6 - 5.1 g/dL    Globulin Calculated 3.1 1.9 - 3.7 g/dL (calc)    A/G Ratio 1.3 1.0 - 2.5 (calc)    Bilirubin Total 0.4 0.2 - 1.2 mg/dL    Alkaline Phosphatase 66 33 - 130 U/L    AST 16 10 - 35 U/L    ALT 9 6 - 29 U/L   HEMOGRAM/PLATELET (BFP)   Result Value Ref Range    WBC 5.9 4.0 - 11 10*9/L    RBC Count 4.12 3.8 - 5.2 10*12/L    Hemoglobin 12.3 11.7 - 15.7 g/dL    Hematocrit 36.8 35.0 - 47.0 %    MCV 89.2 78 - 100 fL    MCH 29.9 26 - 33 pg    MCHC 33.4 31 - 36 g/dL    RDW 12.5 %    Platelet Count 182 150 - 375 10^9/L   Vitamin D Deficiency   Result Value Ref Range    Vitamin D 25-OH Total 57 30 - 100 ng/mL      Comment:      Vitamin D Status         25-OH Vitamin D:     Deficiency:                    <20 ng/mL  Insufficiency:             20 - 29 ng/mL  Optimal:                 > or = 30 ng/mL     For 25-OH Vitamin D testing on patients on   D2-supplementation and patients for whom quantitation   of D2 and D3 fractions is required, the QuestAssureD(TM)  25-OH VIT D, (D2,D3), LC/MS/MS is recommended: order   code 40752 (patients >2yrs).     For more information on this test, go to:  http://education.Billboard Jungle.Apptimize/faq/QQF876  (This link is being  provided for   informational/educational purposes only.)     Albumin Random Urine Quantitative with Creat Ratio   Result Value Ref Range    Albumin Urine mg/spec 10 30    Albumin Urine mg/g Cr 30 30 MG/G Creatinine    Creatinine Urine 100 300       If you have any questions or concerns, please call the clinic at the number listed above.       Sincerely,        Judith Walker MD

## 2018-06-13 NOTE — PROGRESS NOTES
SUBJECTIVE:  82 year old female presents with the following concern:  Not feeling well  Concerned about her thyroid medication    More anxious  More tired-  Weight is up four pounds  Feels irritable  Citalopram/ trial- has been taking 20 mg daily  Grieving her 's death from COPD    Other symptoms : hot flashes    Medical History: current medical problems  [facility] and  helped her vertigo- still has vertigo when she rolls over in bed  No orthostatic symptoms    Has spinal stenosis- Dr Bennett is ordering epidural steroid  Neuropathy- takes gabapentin and lyrica    Diabetis:  Not checking blood sugar  Sees [facility] twice a year- mild glaucoma  No routine dental visits:Dentures since age 20    Talked with her daughter who is with Trisha today at the office- she feels her mother sleeps more than typical-  Drove her mother to the family cabin- she seemed to like her time there , alone.    Patient Active Problem List   Diagnosis     Essential hypertension, benign     Other specified idiopathic peripheral neuropathy     Hypothyroidism     Osteoarthrosis, unspecified whether generalized or localized, involving lower leg     Obesity     Irritable bowel syndrome     Mixed hyperlipidemia     ANNA (obstructive sleep apnea)     Health Care Home     B-complex deficiency     Hallux valgus with bunions     ACP (advance care planning)     Type 2 diabetes mellitus with diabetic neuropathy (H)     Pernicious anemia     Spinal stenosis     SONYA (generalized anxiety disorder)     Past Surgical History:   Procedure Laterality Date     BUNIONECTOMY  4/9/2013    Procedure: BUNIONECTOMY;  RIGHT GREAT CLAWTOE CORRECTION WITH TENDON TRANSFER, ENDOSCOPIC RECESSION OF GASTRONEMIUS, DEBRIDEMENT OF ULCER ON SOLE OF RIGHT FOOT;  Surgeon: Stephon Rae MD;  Location:  [facility]     C OPEN TX TRIMALLEOLAR ANKLE FX W FIX PST LIP Right 2014     ENDOSCOPIC RECESSION GASTROCNEMIUS (DONTA)  4/9/2013    Procedure:  ENDOSCOPIC RECESSION GASTROCNEMIUS (DONTA);;  Surgeon: Stephon Rae MD;  Location: [facility] COLONOSCOPY THRU STOMA, DIAGNOSTIC  2001     HC DEBRIDMENT MASTOID CAVITY, SIMPLE      L ear     HC KNEE SCOPE, DIAGNOSTIC  1997    Arthroscopy, Knee     HC KNEE SCOPE, DIAGNOSTIC  2001    Arthroscopy, Knee     HC REPAIR OF HAMMERTOE,ONE  2015    [facility]     HERNIORRHAPHY INCISIONAL (LOCATION) N/A 1/13/2017    Procedure: HERNIORRHAPHY INCISIONAL (LOCATION);  Surgeon: Joaquin Skaggs MD;  Location: [facility]     IRRIGATION AND DEBRIDEMENT FOOT, COMBINED  4/9/2013    Procedure: COMBINED IRRIGATION AND DEBRIDEMENT FOOT;  Debridement of sole ulcer right foot;  Surgeon: Stephon Rae MD;  Location: [facility]     KNEE SURGERY  2011    right total-Dr. Gorman     Small bowel obstruction  6/2011    Small-bowel obstruction, status post laparotomy with lysis of adhesions, small bowel resection with primary enteroenterostomy and appendectomy     Current Outpatient Prescriptions   Medication     aspirin 81 MG EC tablet     citalopram (CELEXA) 20 MG tablet     Cyanocobalamin (VITAMIN B 12 PO)     gabapentin (NEURONTIN) 300 MG capsule     HYDROcodone-acetaminophen (NORCO) 5-325 MG per tablet     hyoscyamine (ANASPAZ/LEVSIN) 0.125 MG tablet     levothyroxine (SYNTHROID/LEVOTHROID) 112 MCG tablet     lisinopril (PRINIVIL/ZESTRIL) 20 MG tablet     meclizine (ANTIVERT) 25 MG tablet     metFORMIN (GLUCOPHAGE) 1000 MG tablet     Multiple Vitamins-Minerals (SENIOR MULTIVITAMIN PLUS) TABS     OXYCONTIN 10 MG 12 hr tablet     pregabalin (LYRICA) 150 MG capsule     simvastatin (ZOCOR) 20 MG tablet     VITAMIN D, CHOLECALCIFEROL, PO     No current facility-administered medications for this visit.       ROS: 7 point ROS neg other than the symptoms noted above in the HPI.    OBJECTIVE:  /72 (BP Location: Right arm, Patient Position: Chair, Cuff Size: Adult Large)  Pulse (!) 48  Temp 98.3  F (36.8  C) (Oral)  Resp 20   "Ht 1.562 m (5' 1.5\")  Wt 83.5 kg (184 lb)  BMI 34.2 kg/m2  Irregular heart beat- consistent with sinus rhythm with ectopics.  Mild systolic ejection murmurs. No edema or JVD. Chest is clear; no wheezes or rales.  Normal affect- a little tearful when talking about caring for her  the past few years    Assessment     (E03.9) Hypothyroidism, unspecified type  (primary encounter diagnosis)  Comment:   Plan: TSH ([facility]), T4 free ([facility]), T3 uptake         ([facility]), VENOUS COLLECTION            (F43.23) Adjustment disorder with mixed anxiety and depressed mood  Comment:   Plan: EKG 12-lead complete w/read - Clinics,         HEMOGRAM/PLATELET (BFP), Vitamin D Deficiency            (R53.81,  R53.83) Malaise and fatigue  Comment:   Plan: TSH ([facility]), T4 free ([facility]), T3 uptake         ([facility]), VENOUS COLLECTION, VITAMIN B12         ([facility]), Comprehensive metabolic panel, EKG         12-lead complete w/read - Clinics,         HEMOGRAM/PLATELET (BFP), Vitamin D Deficiency            (E53.8) Low vitamin B12 level  Comment: trial of sublingual Vitamin B12  Plan: VENOUS COLLECTION, VITAMIN B12 ([facility])           (E11.40) Type 2 diabetes mellitus with diabetic neuropathy, without long-term current use of insulin (H)  Comment:   Plan: VENOUS COLLECTION, Hemoglobin A1c (BFP),         Comprehensive metabolic panel, EKG 12-lead         complete w/read - Clinics, Albumin Random Urine        Quantitative with Creat Ratio    RECHECK : three months                          "

## 2018-06-13 NOTE — NURSING NOTE
"Trisha Lucia is here for a consult for \"bindu\". She is wanting to talk about her thyroid level    Questioned patient about current smoking habits.  Pt. has never smoked.  PULSE irregular  My Chart: active  CLASSIFICATION OF OVERWEIGHT AND OBESITY BY BMI                        Obesity Class           BMI(kg/m2)  Underweight                                    < 18.5  Normal                                         18.5-24.9  Overweight                                     25.0-29.9  OBESITY                     I                  30.0-34.9                             II                 35.0-39.9  EXTREME OBESITY             III                >40                            Patient's  BMI Body mass index is 34.2 kg/(m^2).  http://hin.nhlbi.nih.gov/menuplanner/menu.cgi  Pre-visit planning  Immunizations - up to date  Colonoscopy -   Mammogram -   Asthma -   PHQ9 -    SONYA-7 -      "

## 2018-06-13 NOTE — MR AVS SNAPSHOT
After Visit Summary   6/13/2018    Trisha Lucia    MRN: [id number]           Patient Information     Date Of Birth          1935        Visit Information        Provider Department      6/13/2018 11:45 AM Judith Walker MD [facility]        Today's Diagnoses     Hypothyroidism, unspecified type        Adjustment disorder with mixed anxiety and depressed mood        Malaise and fatigue        Low vitamin B12 level        Type 2 diabetes mellitus with diabetic neuropathy, without long-term current use of insulin (H)          Care Instructions    Growing through loss- grief counseling with local [facility]              Follow-ups after your visit        Who to contact     If you have questions or need follow up information about today's clinic visit or your schedule please contact [facility] directly at [phone].  Normal or non-critical lab and imaging results will be communicated to you by MyChart, letter or phone within 4 business days after the clinic has received the results. If you do not hear from us within 7 days, please contact the clinic through Nominumhart or phone. If you have a critical or abnormal lab result, we will notify you by phone as soon as possible.  Submit refill requests through [employer] or call your pharmacy and they will forward the refill request to us. Please allow 3 business days for your refill to be completed.          Additional Information About Your Visit        MyChart Information     [employer] gives you secure access to your electronic health record. If you see a primary care provider, you can also send messages to your care team and make appointments. If you have questions, please call your primary care clinic.  If you do not have a primary care provider, please call [phone] and they will assist you.        Care EveryWhere ID     This is your Care EveryWhere ID. This could be used by other organizations to  "access your [place] medical records  [id number]        Your Vitals Were     Pulse Temperature Respirations Height BMI (Body Mass Index)       48 98.3  F (36.8  C) (Oral) 20 1.562 m (5' 1.5\") 34.2 kg/m2        Blood Pressure from Last 3 Encounters:   06/13/18 134/72   12/28/17 114/70   11/02/17 126/60    Weight from Last 3 Encounters:   06/13/18 83.5 kg (184 lb)   12/28/17 82.3 kg (181 lb 6.4 oz)   11/02/17 81.9 kg (180 lb 9.6 oz)              We Performed the Following     Albumin Random Urine Quantitative with Creat Ratio     Comprehensive metabolic panel     EKG 12-lead complete w/read - Clinics     Hemoglobin A1c (BFP)     HEMOGRAM/PLATELET (BFP)     T3 uptake ([facility])     T4 free ([facility])     TSH ([facility])     VENOUS COLLECTION     VITAMIN B12 ([facility])     Vitamin D Deficiency          Today's Medication Changes          These changes are accurate as of 6/13/18 11:59 PM.  If you have any questions, ask your nurse or doctor.               These medicines have changed or have updated prescriptions.        Dose/Directions    levothyroxine 112 MCG tablet   Commonly known as:  SYNTHROID/LEVOTHROID   This may have changed:  Another medication with the same name was removed. Continue taking this medication, and follow the directions you see here.   Used for:  Hypothyroidism, unspecified type   Changed by:  Judith Walker MD        Dose:  112 mcg   Take 1 tablet (112 mcg) by mouth daily   Quantity:  90 tablet   Refills:  1                Primary Care Provider Office Phone # Fax #    Judith Wlaker [phone] [address] [place] [address]        Equal Access to Services     [facility] : Hadii sen angelo Soleonor, waaxda luqadaha, qaybta kaalmada shirley, raul leonard. So [facility] [phone].    ATENCIÓN: Si habla español, tiene a duarte disposición servicios gratuitos de asistencia lingüística. Llame al [phone].    We comply with " applicable federal civil rights laws and [place] laws. We do not discriminate on the basis of race, color, national origin, age, disability, sex, sexual orientation, or gender identity.            Thank you!     Thank you for choosing [facility]  for your care. Our goal is always to provide you with excellent care. Hearing back from our patients is one way we can continue to improve our services. Please take a few minutes to complete the written survey that you may receive in the mail after your visit with us. Thank you!             Your Updated Medication List - Protect others around you: Learn how to safely use, store and throw away your medicines at www.disposemymeds.org.          This list is accurate as of 6/13/18 11:59 PM.  Always use your most recent med list.                   Brand Name Dispense Instructions for use Diagnosis    aspirin 81 MG EC tablet     90 tablet    Take 1 tablet by mouth daily.        citalopram 20 MG tablet    celeXA    90 tablet    Take 1 tablet (20 mg) by mouth daily    Peripheral polyneuropathy       gabapentin 300 MG capsule    NEURONTIN     Take 300 mg by mouth every evening        HYDROcodone-acetaminophen 5-325 MG per tablet    NORCO    30 tablet    Take 1-2 tablets by mouth every 4 hours as needed for other (Moderate to Severe Pain)    Ventral hernia without obstruction or gangrene       hyoscyamine 0.125 MG tablet    ANASPAZ/LEVSIN    30 tablet    Take 1 tablet (125 mcg) by mouth 4 times daily (before meals and nightly)    Irritable bowel syndrome, unspecified type       levothyroxine 112 MCG tablet    SYNTHROID/LEVOTHROID    90 tablet    Take 1 tablet (112 mcg) by mouth daily    Hypothyroidism, unspecified type       lisinopril 20 MG tablet    PRINIVIL/ZESTRIL    90 tablet    Take 1 tablet (20 mg) by mouth daily    Essential hypertension, benign, Type 2 diabetes mellitus with diabetic neuropathy, without long-term current use of insulin (H)        LYRICA 150 MG capsule   Generic drug:  pregabalin      Take 150 mg by mouth 3 times daily.        meclizine 25 MG tablet    ANTIVERT    30 tablet    TAKE ONE TABLET BY MOUTH EVERY SIX HOURS AS NEEDED FOR DIZZINESS    Vertigo, benign positional, unspecified laterality       metFORMIN 1000 MG tablet    GLUCOPHAGE    180 tablet    TAKE ONE TABLET BY MOUTH TWICE DAILY WITH MEALS    Type 2 diabetes mellitus without complication, without long-term current use of insulin (H)       OXYCONTIN 10 MG 12 hr tablet   Generic drug:  oxyCODONE      Take 10 mg by mouth At Bedtime        SENIOR MULTIVITAMIN PLUS Tabs      Take 1 tablet by mouth daily        simvastatin 20 MG tablet    ZOCOR    90 tablet    TAKE 1 TABLET (20 MG) BY MOUTH AT BEDTIME    Mixed hyperlipidemia, Type 2 diabetes mellitus with diabetic neuropathy, without long-term current use of insulin (H)       VITAMIN B 12 PO      Take 50 mcg by mouth daily        VITAMIN D (CHOLECALCIFEROL) PO      Take 1,000 Units by mouth daily

## 2018-06-14 LAB
ALBUMIN SERPL-MCNC: 4.1 G/DL (ref 3.6–5.1)
ALBUMIN/GLOB SERPL: 1.3 (CALC) (ref 1–2.5)
ALP SERPL-CCNC: 66 U/L (ref 33–130)
ALT SERPL-CCNC: 9 U/L (ref 6–29)
AST SERPL-CCNC: 16 U/L (ref 10–35)
BILIRUB SERPL-MCNC: 0.4 MG/DL (ref 0.2–1.2)
BUN SERPL-MCNC: 17 MG/DL (ref 7–25)
BUN/CREATININE RATIO: ABNORMAL (CALC) (ref 6–22)
CALCIUM SERPL-MCNC: 9.2 MG/DL (ref 8.6–10.4)
CHLORIDE SERPLBLD-SCNC: 104 MMOL/L (ref 98–110)
CO2 SERPL-SCNC: 24 MMOL/L (ref 20–31)
CREAT SERPL-MCNC: 0.63 MG/DL (ref 0.6–0.88)
EGFR AFRICAN AMERICAN - QUEST: 97 ML/MIN/1.73M2
GFR SERPL CREATININE-BSD FRML MDRD: 84 ML/MIN/1.73M2
GLOBULIN, CALCULATED - QUEST: 3.1 G/DL (CALC) (ref 1.9–3.7)
GLUCOSE - QUEST: 103 MG/DL (ref 65–99)
POTASSIUM SERPL-SCNC: 4.3 MMOL/L (ref 3.5–5.3)
PROT SERPL-MCNC: 7.2 G/DL (ref 6.1–8.1)
SODIUM SERPL-SCNC: 137 MMOL/L (ref 135–146)
T3RU NFR SERPL: 34 % (ref 22–35)
T4, FREE, NON-DIALYSIS - QUEST: 1.4 NG/DL (ref 0.8–1.8)
TSH SERPL-ACNC: 0.89 MIU/L (ref 0.4–4.5)
VIT B12 SERPL-MCNC: 257 PG/ML (ref 200–1100)
VITAMIN D, 25-OH, TOTAL - QUEST: 57 NG/ML (ref 30–100)

## 2018-06-27 DIAGNOSIS — E03.9 HYPOTHYROIDISM, UNSPECIFIED TYPE: ICD-10-CM

## 2018-06-27 RX ORDER — LEVOTHYROXINE SODIUM 112 UG/1
TABLET ORAL
Qty: 90 TABLET | Refills: 3 | Status: SHIPPED | OUTPATIENT
Start: 2018-06-27 | End: 2019-06-28

## 2018-06-27 NOTE — TELEPHONE ENCOUNTER
Pending Prescriptions:                       Disp   Refills    levothyroxine (SYNTHROID/LEVOTHROID) 112 *12 tab*             Sig: Take 1 tablet (112 mcg) by mouth daily    Pt had blood work 6- done at ov  Please change qty and fax  Eves  [phone] (home) None (work)

## 2018-07-08 DIAGNOSIS — E11.9 TYPE 2 DIABETES MELLITUS WITHOUT COMPLICATION, WITHOUT LONG-TERM CURRENT USE OF INSULIN (H): ICD-10-CM

## 2018-07-08 DIAGNOSIS — G62.9 PERIPHERAL POLYNEUROPATHY: ICD-10-CM

## 2018-07-09 RX ORDER — CITALOPRAM HYDROBROMIDE 20 MG/1
TABLET ORAL
Qty: 90 TABLET | Refills: 1 | Status: SHIPPED | OUTPATIENT
Start: 2018-07-09 | End: 2019-01-01

## 2018-07-09 NOTE — TELEPHONE ENCOUNTER
Pending Prescriptions:                       Disp   Refills    metFORMIN (GLUCOPHAGE) 1000 MG tablet [Ph*180 ta*             Sig: TAKE ONE TABLET BY MOUTH TWICE DAILY WITH MEALS    citalopram (CELEXA) 20 MG tablet [Pharmac*90 tab*             Sig: Take 1 tablet (20 mg) by mouth daily    Last refill was 1-2018   Had an appt and blood work on 6-2018  Please change qty fax advise,   Frannie  [phone] (home) None (work)

## 2018-08-01 DIAGNOSIS — I10 ESSENTIAL HYPERTENSION, BENIGN: ICD-10-CM

## 2018-08-01 DIAGNOSIS — E11.40 TYPE 2 DIABETES MELLITUS WITH DIABETIC NEUROPATHY, WITHOUT LONG-TERM CURRENT USE OF INSULIN (H): ICD-10-CM

## 2018-08-01 RX ORDER — LISINOPRIL 20 MG/1
TABLET ORAL
Qty: 90 TABLET | Refills: 1 | Status: SHIPPED | OUTPATIENT
Start: 2018-08-01 | End: 2019-01-11

## 2018-08-01 NOTE — TELEPHONE ENCOUNTER
Received incoming refill request for  Pending Prescriptions:                       Disp   Refills    lisinopril (PRINIVIL/ZESTRIL) 20 MG table*90 tab*0            Sig: Take 1 tablet (20 mg) by mouth daily    Patient was last seen in clinic on 6/13/18. She was last given 90 day supply with one refill on 1/1/2018. Routing to Dr. Walker for approval or denial of refill.

## 2018-10-11 ENCOUNTER — TELEPHONE (OUTPATIENT)
Dept: FAMILY MEDICINE | Facility: CLINIC | Age: 83
End: 2018-10-11

## 2018-10-11 DIAGNOSIS — R79.89 LOW VITAMIN B12 LEVEL: Primary | ICD-10-CM

## 2018-10-11 NOTE — TELEPHONE ENCOUNTER
We can do the monthly injections or could prescribe B12 injections and teach her how to give herself injections-  Whatever she would like to do-  Orders are in epic- would need NDC pari

## 2018-10-11 NOTE — TELEPHONE ENCOUNTER
"Patient called into the CS line and stated that she saw Dr. Angela through [place] [facility]. She used to see Dr. Bennett but since she left the clinic  Abruptly and Trisha thought that she was going to leave the clinic in December but Dr. Bennett left earlier. Yesterday was the patients first appointment with her new doctor, Dr. Angela. They went through a lot of her history with Dr. Walker and Dr. Bennett and he explained things he agreed with and didn't agree with. She explained that Dr. Bennett assigned her to go to our office to start getting her B12 monthly injections but Dr. Walker told her that she no longer needs to do the injections and can just start taking the pills that can go under her tongue and take this daily. Dr. Angela said that this is not a good idea because the patient is not getting the b12 that she needs from this and her numbers are way down and that this can cause a lot of issues lately. The patient feels she has had a lot of issues going on but she did just recently lose her  and is not sure if this is what is causing her issues. Dr. Angela explained that the monthly injections are better because they hang around longer in the body and he feels this is part of the reason why she is feeling weakness and some anxiety and just feeling \"off\". Patient is wondering what Dr. Walker's thoughts are on this and is wondering if she can start getting these injections done again through our office. If she can start getting this done again, would we be able to do the prescription for them or should she be getting this through neurology? I asked the patient if Dr. Angela performed any labs to see where she currently was for b12 levels and she said he did not he was just looking at past records. I asked the patient how much of the sublingual b12 tablets she was taking a day and she stated she was taking 1000 mg daily. I told her that per Dr. Walker's result note from 6/13/18, she was supposed to " be taking 2500 mg a day and that could be why she is feeling the way she is and if her levels are lower. She expressed understanding and feels she would just like to go back to the injections. Routing to Dr. Walker for review, please advise.     Patients call back number is [phone]

## 2018-10-12 NOTE — TELEPHONE ENCOUNTER
Patient will do injections at the clinic here and will call when she looks at her schedule and finds a good time to come in. She had no further questions or concerns at this time.

## 2018-10-18 ENCOUNTER — ALLIED HEALTH/NURSE VISIT (OUTPATIENT)
Dept: FAMILY MEDICINE | Facility: CLINIC | Age: 83
End: 2018-10-18

## 2018-10-18 DIAGNOSIS — D51.0 PERNICIOUS ANEMIA: Primary | ICD-10-CM

## 2018-10-18 PROCEDURE — G0008 ADMIN INFLUENZA VIRUS VAC: HCPCS | Performed by: FAMILY MEDICINE

## 2018-10-18 PROCEDURE — 90686 IIV4 VACC NO PRSV 0.5 ML IM: CPT | Performed by: FAMILY MEDICINE

## 2018-10-18 PROCEDURE — 96372 THER/PROPH/DIAG INJ SC/IM: CPT | Performed by: FAMILY MEDICINE

## 2018-10-18 RX ORDER — CYANOCOBALAMIN 1000 UG/ML
1 INJECTION, SOLUTION INTRAMUSCULAR; SUBCUTANEOUS
Qty: 1 ML | Refills: 11 | COMMUNITY
Start: 2018-10-18 | End: 2018-11-17

## 2018-10-18 NOTE — TELEPHONE ENCOUNTER
I have set up an appt for today for Trisha to do her B-12 inj. but I do not see the orders in the medications.  Did you enter it elsewhere?  Please advise

## 2018-10-18 NOTE — NURSING NOTE
The following medication was given:     MEDICATION: Vitamin B12  1000mcg  ROUTE: IM  SITE: Deltoid - Left  DOSE: 1 ML  LOT #: [id number]  :  [employer] [place]  EXPIRATION DATE:  110119  NDC#: 3164-5496-19

## 2018-10-18 NOTE — MR AVS SNAPSHOT
After Visit Summary   10/18/2018    Trisha Lucia    MRN: [id number]           Patient Information     Date Of Birth          1935        Visit Information        Provider Department      10/18/2018 3:00 PM Judith Walker MD [facility]        Today's Diagnoses     Pernicious anemia    -  1       Follow-ups after your visit        Who to contact     If you have questions or need follow up information about today's clinic visit or your schedule please contact [facility] directly at [phone].  Normal or non-critical lab and imaging results will be communicated to you by [employer], letter or phone within 4 business days after the clinic has received the results. If you do not hear from us within 7 days, please contact the clinic through [employer] or phone. If you have a critical or abnormal lab result, we will notify you by phone as soon as possible.  Submit refill requests through [employer] or call your pharmacy and they will forward the refill request to us. Please allow 3 business days for your refill to be completed.          Additional Information About Your Visit        MyChart Information     [employer] gives you secure access to your electronic health record. If you see a primary care provider, you can also send messages to your care team and make appointments. If you have questions, please call your primary care clinic.  If you do not have a primary care provider, please call [phone] and they will assist you.        Care EveryWhere ID     This is your Care EveryWhere ID. This could be used by other organizations to access your [place] medical records  [id number]         Blood Pressure from Last 3 Encounters:   06/13/18 134/72   12/28/17 114/70   11/02/17 126/60    Weight from Last 3 Encounters:   06/13/18 83.5 kg (184 lb)   12/28/17 82.3 kg (181 lb 6.4 oz)   11/02/17 81.9 kg (180 lb 9.6 oz)              We Performed the Following      HC FLU VAC PRESRV FREE QUAD SPLIT VIR 3+YRS IM     VACCINE ADMINISTRATION, INITIAL     VITAMIN B12 INJ /1000MCG        Primary Care Provider Office Phone # Fax #    Judith Walker [phone] [address] [place] [phone]        Equal Access to Services     ROSA ISBELL : Hadii aad ku hadasho Soomaali, waaxda luqadaha, qaybta kaalmada adeegyada, waxay idiin hayaan adeeg artirosa maria lacarmenn . So [facility] [phone].    ATENCIÓN: Si habla español, tiene a duarte disposición servicios gratuitos de asistencia lingüística. Alee al [phone].    We comply with applicable federal civil rights laws and [place] laws. We do not discriminate on the basis of race, color, national origin, age, disability, sex, sexual orientation, or gender identity.            Thank you!     Thank you for choosing [facility]  for your care. Our goal is always to provide you with excellent care. Hearing back from our patients is one way we can continue to improve our services. Please take a few minutes to complete the written survey that you may receive in the mail after your visit with us. Thank you!             Your Updated Medication List - Protect others around you: Learn how to safely use, store and throw away your medicines at www.disposemymeds.org.          This list is accurate as of 10/18/18  4:22 PM.  Always use your most recent med list.                   Brand Name Dispense Instructions for use Diagnosis    aspirin 81 MG EC tablet     90 tablet    Take 1 tablet by mouth daily.        citalopram 20 MG tablet    celeXA    90 tablet    Take 1 tablet (20 mg) by mouth daily    Peripheral polyneuropathy       gabapentin 300 MG capsule    NEURONTIN     Take 300 mg by mouth every evening        HYDROcodone-acetaminophen 5-325 MG per tablet    NORCO    30 tablet    Take 1-2 tablets by mouth every 4 hours as needed for other (Moderate to Severe Pain)    Ventral hernia without  obstruction or gangrene       hyoscyamine 0.125 MG tablet    ANASPAZ/LEVSIN    30 tablet    Take 1 tablet (125 mcg) by mouth 4 times daily (before meals and nightly)    Irritable bowel syndrome, unspecified type       levothyroxine 112 MCG tablet    SYNTHROID/LEVOTHROID    90 tablet    Take 1 tablet (112 mcg) by mouth daily    Hypothyroidism, unspecified type       lisinopril 20 MG tablet    PRINIVIL/ZESTRIL    90 tablet    Take 1 tablet (20 mg) by mouth daily    Essential hypertension, benign, Type 2 diabetes mellitus with diabetic neuropathy, without long-term current use of insulin (H)       LYRICA 150 MG capsule   Generic drug:  pregabalin      Take 150 mg by mouth 3 times daily.        meclizine 25 MG tablet    ANTIVERT    30 tablet    TAKE ONE TABLET BY MOUTH EVERY SIX HOURS AS NEEDED FOR DIZZINESS    Vertigo, benign positional, unspecified laterality       metFORMIN 1000 MG tablet    GLUCOPHAGE    180 tablet    TAKE ONE TABLET BY MOUTH TWICE DAILY WITH MEALS    Type 2 diabetes mellitus without complication, without long-term current use of insulin (H)       OXYCONTIN 10 MG 12 hr tablet   Generic drug:  oxyCODONE      Take 10 mg by mouth At Bedtime        SENIOR MULTIVITAMIN PLUS Tabs      Take 1 tablet by mouth daily        simvastatin 20 MG tablet    ZOCOR    90 tablet    TAKE 1 TABLET (20 MG) BY MOUTH AT BEDTIME    Mixed hyperlipidemia, Type 2 diabetes mellitus with diabetic neuropathy, without long-term current use of insulin (H)       VITAMIN B 12 PO      Take 50 mcg by mouth daily        VITAMIN D (CHOLECALCIFEROL) PO      Take 1,000 Units by mouth daily

## 2018-11-14 ENCOUNTER — TRANSFERRED RECORDS (OUTPATIENT)
Dept: FAMILY MEDICINE | Facility: CLINIC | Age: 83
End: 2018-11-14

## 2018-11-16 ENCOUNTER — ALLIED HEALTH/NURSE VISIT (OUTPATIENT)
Dept: FAMILY MEDICINE | Facility: CLINIC | Age: 83
End: 2018-11-16

## 2018-11-16 DIAGNOSIS — E53.8 VITAMIN B12 DEFICIENCY (NON ANEMIC): Primary | ICD-10-CM

## 2018-11-16 PROCEDURE — 96372 THER/PROPH/DIAG INJ SC/IM: CPT | Performed by: FAMILY MEDICINE

## 2018-11-16 NOTE — MR AVS SNAPSHOT
After Visit Summary   11/16/2018    Trisha Lucia    MRN: [id number]           Patient Information     Date Of Birth          1935        Visit Information        Provider Department      11/16/2018 2:00 PM Judith Walker MD [facility]        Today's Diagnoses     Vitamin B12 deficiency (non anemic)    -  1       Follow-ups after your visit        Who to contact     If you have questions or need follow up information about today's clinic visit or your schedule please contact [facility] directly at [phone].  Normal or non-critical lab and imaging results will be communicated to you by [employer], letter or phone within 4 business days after the clinic has received the results. If you do not hear from us within 7 days, please contact the clinic through [employer] or phone. If you have a critical or abnormal lab result, we will notify you by phone as soon as possible.  Submit refill requests through [employer] or call your pharmacy and they will forward the refill request to us. Please allow 3 business days for your refill to be completed.          Additional Information About Your Visit        MyChart Information     [employer] gives you secure access to your electronic health record. If you see a primary care provider, you can also send messages to your care team and make appointments. If you have questions, please call your primary care clinic.  If you do not have a primary care provider, please call [phone] and they will assist you.        Care EveryWhere ID     This is your Care EveryWhere ID. This could be used by other organizations to access your [place] medical records  [id number]         Blood Pressure from Last 3 Encounters:   06/13/18 134/72   12/28/17 114/70   11/02/17 126/60    Weight from Last 3 Encounters:   06/13/18 83.5 kg (184 lb)   12/28/17 82.3 kg (181 lb 6.4 oz)   11/02/17 81.9 kg (180 lb 9.6 oz)              We Performed  the Following     VITAMIN B12 INJ /[facility]        Primary Care Provider Office Phone # Fax #    Judith Walker [phone] [address] [facility] [address]  [facility] [address]        Equal Access to Services     BURTKARYN SUKHI : Hadii sen ku hadkimo Soomaali, waaxda luqadaha, qaybta kaalmada adeegyada, raul zhu hayyun janelle ny terrence . So [facility] [phone].    ATENCIÓN: Si habla español, tiene a duarte disposición servicios gratuitos de asistencia lingüística. Llame al [phone].    We comply with applicable federal civil rights laws and [place] laws. We do not discriminate on the basis of race, color, national origin, age, disability, sex, sexual orientation, or gender identity.            Thank you!     Thank you for choosing [facility]  for your care. Our goal is always to provide you with excellent care. Hearing back from our patients is one way we can continue to improve our services. Please take a few minutes to complete the written survey that you may receive in the mail after your visit with us. Thank you!             Your Updated Medication List - Protect others around you: Learn how to safely use, store and throw away your medicines at www.disposemymeds.org.          This list is accurate as of 11/16/18  2:10 PM.  Always use your most recent med list.                   Brand Name Dispense Instructions for use Diagnosis    aspirin 81 MG EC tablet     90 tablet    Take 1 tablet by mouth daily.        citalopram 20 MG tablet    celeXA    90 tablet    Take 1 tablet (20 mg) by mouth daily    Peripheral polyneuropathy       cyanocobalamin 1000 MCG/ML injection    VITAMIN B12    1 mL    Inject 1 mL (1,000 mcg) into the muscle every 30 days        gabapentin 300 MG capsule    NEURONTIN     Take 300 mg by mouth every evening        HYDROcodone-acetaminophen 5-325 MG per tablet    NORCO    30 tablet    Take 1-2 tablets by mouth every 4 hours as needed for other  (Moderate to Severe Pain)    Ventral hernia without obstruction or gangrene       hyoscyamine 0.125 MG tablet    ANASPAZ/LEVSIN    30 tablet    Take 1 tablet (125 mcg) by mouth 4 times daily (before meals and nightly)    Irritable bowel syndrome, unspecified type       levothyroxine 112 MCG tablet    SYNTHROID/LEVOTHROID    90 tablet    Take 1 tablet (112 mcg) by mouth daily    Hypothyroidism, unspecified type       lisinopril 20 MG tablet    PRINIVIL/ZESTRIL    90 tablet    Take 1 tablet (20 mg) by mouth daily    Essential hypertension, benign, Type 2 diabetes mellitus with diabetic neuropathy, without long-term current use of insulin (H)       LYRICA 150 MG capsule   Generic drug:  pregabalin      Take 150 mg by mouth 3 times daily.        meclizine 25 MG tablet    ANTIVERT    30 tablet    TAKE ONE TABLET BY MOUTH EVERY SIX HOURS AS NEEDED FOR DIZZINESS    Vertigo, benign positional, unspecified laterality       metFORMIN 1000 MG tablet    GLUCOPHAGE    180 tablet    TAKE ONE TABLET BY MOUTH TWICE DAILY WITH MEALS    Type 2 diabetes mellitus without complication, without long-term current use of insulin (H)       OXYCONTIN 10 MG 12 hr tablet   Generic drug:  oxyCODONE      Take 10 mg by mouth At Bedtime        SENIOR MULTIVITAMIN PLUS Tabs      Take 1 tablet by mouth daily        simvastatin 20 MG tablet    ZOCOR    90 tablet    TAKE 1 TABLET (20 MG) BY MOUTH AT BEDTIME    Mixed hyperlipidemia, Type 2 diabetes mellitus with diabetic neuropathy, without long-term current use of insulin (H)       VITAMIN D (CHOLECALCIFEROL) PO      Take 1,000 Units by mouth daily

## 2018-11-16 NOTE — NURSING NOTE
Patient is here for b12 injection today.    The following medication was given:     MEDICATION: Vitamin B12  1000 mcg  ROUTE: IM  SITE: Deltoid - Right  DOSE: 1000 mcg  LOT #: [id number]   :  [employer] [place]  EXPIRATION DATE:  11/1/2019  NDC#: 9563-3858-79    Given by: Haleigh Easley CMA

## 2018-12-08 DIAGNOSIS — E11.40 TYPE 2 DIABETES MELLITUS WITH DIABETIC NEUROPATHY, WITHOUT LONG-TERM CURRENT USE OF INSULIN (H): ICD-10-CM

## 2018-12-08 DIAGNOSIS — E78.2 MIXED HYPERLIPIDEMIA: ICD-10-CM

## 2018-12-10 ENCOUNTER — ALLIED HEALTH/NURSE VISIT (OUTPATIENT)
Dept: FAMILY MEDICINE | Facility: CLINIC | Age: 83
End: 2018-12-10

## 2018-12-10 DIAGNOSIS — E53.9 B-COMPLEX DEFICIENCY: Primary | ICD-10-CM

## 2018-12-10 PROCEDURE — 96372 THER/PROPH/DIAG INJ SC/IM: CPT | Performed by: FAMILY MEDICINE

## 2018-12-10 RX ORDER — SIMVASTATIN 20 MG
TABLET ORAL
Qty: 30 TABLET | Refills: 0 | Status: SHIPPED | OUTPATIENT
Start: 2018-12-10 | End: 2019-01-05

## 2018-12-10 RX ORDER — CYANOCOBALAMIN 1000 UG/ML
1000 INJECTION, SOLUTION INTRAMUSCULAR; SUBCUTANEOUS
Status: DISCONTINUED | OUTPATIENT
Start: 2018-12-10 | End: 2021-10-26 | Stop reason: CLARIF

## 2018-12-10 RX ADMIN — CYANOCOBALAMIN 1000 MCG: 1000 INJECTION, SOLUTION INTRAMUSCULAR; SUBCUTANEOUS at 16:39

## 2018-12-10 NOTE — NURSING NOTE
The following medication was given:     MEDICATION: Vitamin B12  1000mcg  ROUTE: IM  SITE: Deltoid - Right  DOSE: 1.0 mL  LOT #: [id number]  :  [employer] [place]  EXPIRATION DATE:  4/20  NDC#: 9115-4702-53

## 2018-12-10 NOTE — TELEPHONE ENCOUNTER
Trisha Lucia is requesting a refill of:    Pending Prescriptions:                       Disp   Refills    simvastatin (ZOCOR) 20 MG tablet [Pharmac*30 tab*0            Sig: TAKE 1 TABLET (20 MG) BY MOUTH AT BEDTIME    Needs OV for refills.    [phone]

## 2018-12-18 ENCOUNTER — TRANSFERRED RECORDS (OUTPATIENT)
Dept: FAMILY MEDICINE | Facility: CLINIC | Age: 83
End: 2018-12-18

## 2019-01-01 DIAGNOSIS — G62.9 PERIPHERAL POLYNEUROPATHY: ICD-10-CM

## 2019-01-02 RX ORDER — CITALOPRAM HYDROBROMIDE 20 MG/1
TABLET ORAL
Qty: 30 TABLET | Refills: 0 | COMMUNITY
Start: 2019-01-02 | End: 2019-01-11

## 2019-01-02 NOTE — TELEPHONE ENCOUNTER
Please let the patient know that a 30 day refill has been sent for their prescription.  They are due for a return fasting office visit within 30 days.  Failure to schedule an appointment may result in no further refills of his/her medication.    ]

## 2019-01-02 NOTE — TELEPHONE ENCOUNTER
Ok refill of citalopram for one month only called into [facility]. Pt needs fasting OV for further refills.     Thanks,Destiny    [phone]

## 2019-01-05 DIAGNOSIS — E78.2 MIXED HYPERLIPIDEMIA: ICD-10-CM

## 2019-01-05 DIAGNOSIS — E11.40 TYPE 2 DIABETES MELLITUS WITH DIABETIC NEUROPATHY, WITHOUT LONG-TERM CURRENT USE OF INSULIN (H): ICD-10-CM

## 2019-01-05 NOTE — TELEPHONE ENCOUNTER
Call patient to see if she an be seen on Monday-  Evening hours if she needs family to bring to office-  Skip lunch, but eat breakfast if coming later in the day

## 2019-01-05 NOTE — TELEPHONE ENCOUNTER
Received incoming refill request for   Pending Prescriptions:                       Disp   Refills    simvastatin (ZOCOR) 20 MG tablet [Pharmac*30 tab*0            Sig: TAKE 1 TABLET (20 MG) BY MOUTH AT BEDTIME    Patient had a 30 day supply sent in on 12/10/18 and is due for office visit. There is currently no office visit scheduled, routing to Dr. Walker for approval or denial of medication.

## 2019-01-07 RX ORDER — SIMVASTATIN 20 MG
TABLET ORAL
Qty: 30 TABLET | Refills: 0 | Status: SHIPPED | OUTPATIENT
Start: 2019-01-07 | End: 2019-01-11

## 2019-01-11 ENCOUNTER — OFFICE VISIT (OUTPATIENT)
Dept: FAMILY MEDICINE | Facility: CLINIC | Age: 84
End: 2019-01-11

## 2019-01-11 VITALS
HEART RATE: 68 BPM | HEIGHT: 62 IN | WEIGHT: 180 LBS | RESPIRATION RATE: 20 BRPM | BODY MASS INDEX: 33.13 KG/M2 | DIASTOLIC BLOOD PRESSURE: 70 MMHG | TEMPERATURE: 97.5 F | SYSTOLIC BLOOD PRESSURE: 146 MMHG

## 2019-01-11 DIAGNOSIS — F41.1 GAD (GENERALIZED ANXIETY DISORDER): ICD-10-CM

## 2019-01-11 DIAGNOSIS — E78.2 MIXED HYPERLIPIDEMIA: ICD-10-CM

## 2019-01-11 DIAGNOSIS — E11.40 TYPE 2 DIABETES MELLITUS WITH DIABETIC NEUROPATHY, WITHOUT LONG-TERM CURRENT USE OF INSULIN (H): ICD-10-CM

## 2019-01-11 DIAGNOSIS — E53.8 LOW SERUM VITAMIN B12: ICD-10-CM

## 2019-01-11 DIAGNOSIS — G62.9 PERIPHERAL POLYNEUROPATHY: ICD-10-CM

## 2019-01-11 DIAGNOSIS — I10 ESSENTIAL HYPERTENSION, BENIGN: ICD-10-CM

## 2019-01-11 LAB — HBA1C MFR BLD: 6.5 % (ref 4–7)

## 2019-01-11 PROCEDURE — 36415 COLL VENOUS BLD VENIPUNCTURE: CPT | Performed by: FAMILY MEDICINE

## 2019-01-11 PROCEDURE — 83036 HEMOGLOBIN GLYCOSYLATED A1C: CPT | Performed by: FAMILY MEDICINE

## 2019-01-11 PROCEDURE — 99214 OFFICE O/P EST MOD 30 MIN: CPT | Performed by: FAMILY MEDICINE

## 2019-01-11 RX ORDER — SIMVASTATIN 20 MG
TABLET ORAL
Qty: 90 TABLET | Refills: 1 | Status: SHIPPED | OUTPATIENT
Start: 2019-01-11 | End: 2019-08-07

## 2019-01-11 RX ORDER — LISINOPRIL 20 MG/1
20 TABLET ORAL DAILY
Qty: 90 TABLET | Refills: 1 | Status: SHIPPED | OUTPATIENT
Start: 2019-01-11 | End: 2019-08-07

## 2019-01-11 RX ORDER — CITALOPRAM HYDROBROMIDE 20 MG/1
20 TABLET ORAL DAILY
Qty: 90 TABLET | Refills: 1 | Status: SHIPPED | OUTPATIENT
Start: 2019-01-11 | End: 2019-08-07

## 2019-01-11 RX ADMIN — CYANOCOBALAMIN 1000 MCG: 1000 INJECTION, SOLUTION INTRAMUSCULAR; SUBCUTANEOUS at 12:43

## 2019-01-11 ASSESSMENT — ANXIETY QUESTIONNAIRES
3. WORRYING TOO MUCH ABOUT DIFFERENT THINGS: SEVERAL DAYS
2. NOT BEING ABLE TO STOP OR CONTROL WORRYING: NOT AT ALL
GAD7 TOTAL SCORE: 3
6. BECOMING EASILY ANNOYED OR IRRITABLE: SEVERAL DAYS
7. FEELING AFRAID AS IF SOMETHING AWFUL MIGHT HAPPEN: SEVERAL DAYS
IF YOU CHECKED OFF ANY PROBLEMS ON THIS QUESTIONNAIRE, HOW DIFFICULT HAVE THESE PROBLEMS MADE IT FOR YOU TO DO YOUR WORK, TAKE CARE OF THINGS AT HOME, OR GET ALONG WITH OTHER PEOPLE: SOMEWHAT DIFFICULT
1. FEELING NERVOUS, ANXIOUS, OR ON EDGE: NOT AT ALL
5. BEING SO RESTLESS THAT IT IS HARD TO SIT STILL: NOT AT ALL

## 2019-01-11 ASSESSMENT — MIFFLIN-ST. JEOR: SCORE: 1216.78

## 2019-01-11 ASSESSMENT — PATIENT HEALTH QUESTIONNAIRE - PHQ9: 5. POOR APPETITE OR OVEREATING: NOT AT ALL

## 2019-01-11 NOTE — NURSING NOTE
Trisha Lucia is here for a medication check.    Questioned patient about current smoking habits.  Pt. has never smoked.  PULSE regular  My Chart: active  CLASSIFICATION OF OVERWEIGHT AND OBESITY BY BMI                        Obesity Class           BMI(kg/m2)  Underweight                                    < 18.5  Normal                                         18.5-24.9  Overweight                                     25.0-29.9  OBESITY                     I                  30.0-34.9                             II                 35.0-39.9  EXTREME OBESITY             III                >40                            Patient's  BMI Body mass index is 33.46 kg/m .  http://hin.nhlbi.nih.gov/menuplanner/menu.cgi  Pre-visit planning  Immunizations - up to date  Colonoscopy - na  Mammogram -   Asthma -   PHQ9 -    SONYA-7 -

## 2019-01-11 NOTE — PROGRESS NOTES
"  SUBJECTIVE:   Trisha Lucia is a 83 year old female who presents to clinic today for the following health issues:    Hyperlipidemia Follow-Up      Rate your low fat/cholesterol diet?: poor- eats  processed food- TV dinners/ Lean Cuisine/ pickles (high in salt)    Taking statin?  Yes, no muscle aches from statin    Other lipid medications/supplements?:  none      Amount of exercise or physical activity: Limited by her symptoms of spinal stenosis    Problems taking medications regularly: No    Medication side effects: none       Anxiety Follow-Up    Status since last visit: Stable    Other associated symptoms:None    Complicating factors:   Significant life event: Death of  just over a year ago  Current substance abuse: None  Depression symptoms: No  SONYA-7 SCORE 11/2/2017 1/11/2019   Total Score 16 3       SONYA-7 scores 3    DIABETIS FOLLOW UP:     Blood Pressure:/70 (BP Location: Left arm, Patient Position: Chair, Cuff Size: Adult Regular)   Pulse 68   Temp 97.5  F (36.4  C) (Oral)   Resp 20   Ht 1.562 m (5' 1.5\")   Wt 81.6 kg (180 lb)   BMI 33.46 kg/m     ABOVE TARGET- needs monitoring  Ace or Arb: yes  Microalbumin: Normal 6/2018    Blood sugars: not testing    Lipids: LDL 70 today- at goal  Statin:yes    Last eye exam:    Medications:metformin-tolerates  Refills up to date    Foot complaints, paresthesias:neuropathy- toe deformities on left foot    Tobacco:no    Last eye exam 5/16/2018    Other providers involved in her care: Dr Rae- saw yesterday- history of multiple foot/toe surgeries  Dr eBy: injections in spine-   Dr Angela- orders epidural injections-seen this past Tuesday  On oxycontin for neuropathy- lower legs  B12 injections- requested by Dr Angela rather than sublingual  Back pain with house work- vacuuming, making bed-      Problem list and histories reviewed & adjusted, as indicated.  Additional history: as documented    Patient Active Problem List   Diagnosis     Essential " hypertension, benign     Other specified idiopathic peripheral neuropathy     Hypothyroidism     Osteoarthrosis, unspecified whether generalized or localized, involving lower leg     Obesity     Irritable bowel syndrome     Mixed hyperlipidemia     ANNA (obstructive sleep apnea)     Health Care Home     B-complex deficiency     Hallux valgus with bunions     ACP (advance care planning)     Type 2 diabetes mellitus with diabetic neuropathy (H)     Pernicious anemia     Spinal stenosis     SONYA (generalized anxiety disorder)     Past Surgical History:   Procedure Laterality Date     BUNIONECTOMY  4/9/2013    Procedure: BUNIONECTOMY;  RIGHT GREAT CLAWTOE CORRECTION WITH TENDON TRANSFER, ENDOSCOPIC RECESSION OF GASTRONEMIUS, DEBRIDEMENT OF ULCER ON SOLE OF RIGHT FOOT;  Surgeon: Stephon Rae MD;  Location: [facility]     ENDOSCOPIC RECESSION GASTROCNEMIUS (DONTA)  4/9/2013    Procedure: ENDOSCOPIC RECESSION GASTROCNEMIUS (DONTA);;  Surgeon: Stephon Rae MD;  Location: [facility] COLONOSCOPY THRU STOMA, DIAGNOSTIC  2001     HC DEBRIDMENT MASTOID CAVITY, SIMPLE      L ear     HC KNEE SCOPE, DIAGNOSTIC  1997    Arthroscopy, Knee     HC KNEE SCOPE, DIAGNOSTIC  2001    Arthroscopy, Knee     HC OPEN TX TRIMALLEOLAR ANKLE FX W FIX PST LIP Right 2014     HC REPAIR OF HAMMERTOE,ONE  2015    [facility]     HERNIORRHAPHY INCISIONAL (LOCATION) N/A 1/13/2017    Procedure: HERNIORRHAPHY INCISIONAL (LOCATION);  Surgeon: Joaquin Skaggs MD;  Location: [facility]     IRRIGATION AND DEBRIDEMENT FOOT, COMBINED  4/9/2013    Procedure: COMBINED IRRIGATION AND DEBRIDEMENT FOOT;  Debridement of sole ulcer right foot;  Surgeon: Stephon Rae MD;  Location: [facility]     KNEE SURGERY  2011    right total-Dr. Gorman     Small bowel obstruction  6/2011    Small-bowel obstruction, status post laparotomy with lysis of adhesions, small bowel resection with primary enteroenterostomy and appendectomy       Social History      Tobacco Use     Smoking status: Never Smoker     Smokeless tobacco: Never Used   Substance Use Topics     Alcohol use: No     Alcohol/week: 0.0 oz     Comment: 0     Family History   Problem Relation Age of Onset     C.A.D. Mother      Diabetes No family hx of      Hypertension Mother      Breast Cancer No family hx of      Cancer - colorectal No family hx of          Current Outpatient Medications   Medication Sig Dispense Refill     citalopram (CELEXA) 20 MG tablet Take 1 tablet (20 mg) by mouth daily 90 tablet 1     lisinopril (PRINIVIL/ZESTRIL) 20 MG tablet Take 1 tablet (20 mg) by mouth daily 90 tablet 1     simvastatin (ZOCOR) 20 MG tablet TAKE 1 TABLET (20 MG) BY MOUTH AT BEDTIME 90 tablet 1     aspirin 81 MG EC tablet Take 1 tablet by mouth daily. 90 tablet 3     gabapentin (NEURONTIN) 300 MG capsule Take 300 mg by mouth every evening        HYDROcodone-acetaminophen (NORCO) 5-325 MG per tablet Take 1-2 tablets by mouth every 4 hours as needed for other (Moderate to Severe Pain) 30 tablet 0     hyoscyamine (ANASPAZ/LEVSIN) 0.125 MG tablet Take 1 tablet (125 mcg) by mouth 4 times daily (before meals and nightly) 30 tablet 1     levothyroxine (SYNTHROID/LEVOTHROID) 112 MCG tablet Take 1 tablet (112 mcg) by mouth daily 90 tablet 3     meclizine (ANTIVERT) 25 MG tablet TAKE ONE TABLET BY MOUTH EVERY SIX HOURS AS NEEDED FOR DIZZINESS 30 tablet 0     metFORMIN (GLUCOPHAGE) 1000 MG tablet TAKE ONE TABLET BY MOUTH TWICE DAILY WITH MEALS 180 tablet 3     Multiple Vitamins-Minerals (SENIOR MULTIVITAMIN PLUS) TABS Take 1 tablet by mouth daily        OXYCONTIN 10 MG 12 hr tablet Take 10 mg by mouth At Bedtime   0     pregabalin (LYRICA) 150 MG capsule Take 150 mg by mouth 3 times daily.       VITAMIN D, CHOLECALCIFEROL, PO Take 1,000 Units by mouth daily         Reviewed and updated as needed this visit by clinical staff       Reviewed and updated as needed this visit by Provider         ROS:  Constitutional, HEENT,  "cardiovascular, pulmonary, GI, , musculoskeletal, neuro, skin, endocrine and psych systems are negative, except as otherwise noted.    OBJECTIVE:     /70 (BP Location: Left arm, Patient Position: Chair, Cuff Size: Adult Regular)   Pulse 68   Temp 97.5  F (36.4  C) (Oral)   Resp 20   Ht 1.562 m (5' 1.5\")   Wt 81.6 kg (180 lb)   BMI 33.46 kg/m    Body mass index is 33.46 kg/m .   GENERAL: healthy, alert and no distress  NECK: no adenopathy, no asymmetry, masses, or scars and thyroid normal to palpation  RESP: lungs clear to auscultation - no rales, rhonchi or wheezes  CV: regular rate and rhythm,   no peripheral edema and peripheral pulses strong  MS: Left foot, second toe taped- hammer toe no edema  NEURO: no pin prick sensation to proximal tibia, both legs  PSYCH: mentation appears normal, affect normal/bright      Diabetic foot exam:  Dorsalis pedis pulse R:3  Dorsalis pedis pulse L: 3  Skin temperature R:slightly decreased  Skin temperature L:slightly decreased  Capillary refill R:normal  Capillary refill L:normal  Hair growth R:normal  Hair growth L:normal  Nail growth R/L:normal  Monofilament R foot:no filament sensation to level of upper tibia  Monofilament L foot: no filament sensation to level of upper tibia    Diagnostic Test Results:  Results for orders placed or performed in visit on 01/11/19   Lipid Profile   Result Value Ref Range    Cholesterol 133 <200 mg/dL    HDL Cholesterol 43 (L) >50 mg/dL    Triglycerides 118 <150 mg/dL    LDL Cholesterol Calculated 70 mg/dL (calc)    Cholesterol/HDL Ratio 3.1 <5.0 (calc)    Non HDL Cholesterol 90 <130 mg/dL (calc)   Hemoglobin A1c (BFP)   Result Value Ref Range    Hemoglobin A1C 6.5 4.0 - 7.0 %   VITAMIN B12 ([facility])   Result Value Ref Range    Vitamin B12 545 200 - 1,100 pg/mL       ASSESSMENT/PLAN:     (E11.40) Type 2 diabetes mellitus with diabetic neuropathy, without long-term current use of insulin (H)  Comment: Hemoglobin A1C at goal  Plan: " Lipid Profile, VENOUS COLLECTION, Hemoglobin         A1c (BFP), C FOOT EXAM  NO CHARGE, lisinopril         (PRINIVIL/ZESTRIL) 20 MG tablet, simvastatin         (ZOCOR) 20 MG tablet  Refill metformin when due          (G62.9) Peripheral polyneuropathy  Comment:   Plan: VITAMIN B12 ([facility])            (I10) Essential hypertension, benign  Comment: not at goal- REDUCE SALT INTAKE- discussed   Plan: lisinopril (PRINIVIL/ZESTRIL) 20 MG tablet            (E78.2) Mixed hyperlipidemia  Comment:   Plan: simvastatin (ZOCOR) 20 MG tablet            (R79.89) Low serum vitamin B12  Comment:  In therapeutic range- monthly injections  Plan: VITAMIN B12 ([facility])            (F41.1) SONYA (generalized anxiety disorder)  Comment: refill of current dose- symptoms controlled  Plan: citalopram (CELEXA) 20 MG tablet         Follow Up:   Recheck after memorial weekend      Judith Walker MD  [facility]

## 2019-01-12 LAB
CHOLEST SERPL-MCNC: 133 MG/DL
CHOLEST/HDLC SERPL: 3.1 (CALC)
HDLC SERPL-MCNC: 43 MG/DL
LDLC SERPL CALC-MCNC: 70 MG/DL (CALC)
NONHDLC SERPL-MCNC: 90 MG/DL (CALC)
TRIGL SERPL-MCNC: 118 MG/DL
VIT B12 SERPL-MCNC: 545 PG/ML (ref 200–1100)

## 2019-01-12 ASSESSMENT — ANXIETY QUESTIONNAIRES: GAD7 TOTAL SCORE: 3

## 2019-02-12 ENCOUNTER — ALLIED HEALTH/NURSE VISIT (OUTPATIENT)
Dept: FAMILY MEDICINE | Facility: CLINIC | Age: 84
End: 2019-02-12

## 2019-02-12 DIAGNOSIS — E53.9 B-COMPLEX DEFICIENCY: Primary | ICD-10-CM

## 2019-02-12 DIAGNOSIS — H81.10 VERTIGO, BENIGN POSITIONAL, UNSPECIFIED LATERALITY: ICD-10-CM

## 2019-02-12 PROCEDURE — 96372 THER/PROPH/DIAG INJ SC/IM: CPT | Performed by: FAMILY MEDICINE

## 2019-02-12 RX ADMIN — CYANOCOBALAMIN 1000 MCG: 1000 INJECTION, SOLUTION INTRAMUSCULAR; SUBCUTANEOUS at 15:47

## 2019-02-12 NOTE — TELEPHONE ENCOUNTER
Patient was here for nurse only B12 injection and stated that she is going to be going on a plane in March for a wedding and she is in need of a refill of her   Pending Prescriptions:                       Disp   Refills    meclizine (ANTIVERT) 25 MG tablet         30 tab*0          She has a few left but they  on 18 so she does not want to have to take them because of this. She was last seen in clinic on 19 for her medication check and is is only taking this medication as needed. It was originally given to her by Dr. Stevens for flying but she always uses it for when she has vertigo which is not to often. Routing refill request to Dr. Walker for approval or denial.

## 2019-02-12 NOTE — NURSING NOTE
Patient is here for b12 injection today.    The following medication was given:     MEDICATION: Vitamin B12  1000 mcg  ROUTE: IM  SITE: Deltoid - Left  DOSE: 1000 mcg   LOT #: [id number]  :  [employer] [place]  EXPIRATION DATE:  4/1/2020  NDC#: 6309-5116-47    Given by: Haleigh Easley CMA

## 2019-02-13 RX ORDER — MECLIZINE HYDROCHLORIDE 25 MG/1
TABLET ORAL
Qty: 30 TABLET | Refills: 0 | Status: SHIPPED | OUTPATIENT
Start: 2019-02-13 | End: 2020-08-19

## 2019-02-13 NOTE — TELEPHONE ENCOUNTER
Informed patient that medication was sent into the pharmacy for her. She expressed understanding to this and had no further questions or concerns at this time.

## 2019-03-13 ENCOUNTER — ALLIED HEALTH/NURSE VISIT (OUTPATIENT)
Dept: FAMILY MEDICINE | Facility: CLINIC | Age: 84
End: 2019-03-13

## 2019-03-13 DIAGNOSIS — E56.9 VITAMIN DEFICIENCY: Primary | ICD-10-CM

## 2019-03-13 PROCEDURE — 96372 THER/PROPH/DIAG INJ SC/IM: CPT | Performed by: FAMILY MEDICINE

## 2019-03-13 RX ADMIN — CYANOCOBALAMIN 1000 MCG: 1000 INJECTION, SOLUTION INTRAMUSCULAR; SUBCUTANEOUS at 15:50

## 2019-03-13 NOTE — NURSING NOTE
The following medication was given:     MEDICATION: Vitamin B12  1000mcg  ROUTE: IM  SITE: Deltoid - Right  DOSE: 1ML  LOT #: [id number]  :  [employer] [place]  EXPIRATION DATE:  09/01/20  NDC#: 0500-2926-99

## 2019-04-03 ENCOUNTER — TRANSFERRED RECORDS (OUTPATIENT)
Dept: FAMILY MEDICINE | Facility: CLINIC | Age: 84
End: 2019-04-03

## 2019-04-17 ENCOUNTER — ALLIED HEALTH/NURSE VISIT (OUTPATIENT)
Dept: FAMILY MEDICINE | Facility: CLINIC | Age: 84
End: 2019-04-17

## 2019-04-17 DIAGNOSIS — E53.9 B-COMPLEX DEFICIENCY: Primary | ICD-10-CM

## 2019-04-17 PROCEDURE — 96372 THER/PROPH/DIAG INJ SC/IM: CPT | Performed by: FAMILY MEDICINE

## 2019-04-17 RX ADMIN — CYANOCOBALAMIN 1000 MCG: 1000 INJECTION, SOLUTION INTRAMUSCULAR; SUBCUTANEOUS at 15:32

## 2019-04-17 NOTE — NURSING NOTE
Patient is here for Vitamin B12 injection today.     The following medication was given:     MEDICATION: Vitamin B12  1000 mcg  ROUTE: IM  SITE: Deltoid - Left  DOSE: 1000 mcg   LOT #: [id number]  :  [employer] [place]  EXPIRATION DATE:  9/1/2020  NDC#: 4493-0684-57     Given by: Haleigh Easley CMA

## 2019-05-03 ENCOUNTER — HEALTH MAINTENANCE LETTER (OUTPATIENT)
Age: 84
End: 2019-05-03

## 2019-05-16 ENCOUNTER — ALLIED HEALTH/NURSE VISIT (OUTPATIENT)
Dept: FAMILY MEDICINE | Facility: CLINIC | Age: 84
End: 2019-05-16

## 2019-05-16 DIAGNOSIS — E53.9 B-COMPLEX DEFICIENCY: Primary | ICD-10-CM

## 2019-05-16 PROCEDURE — 96372 THER/PROPH/DIAG INJ SC/IM: CPT | Performed by: FAMILY MEDICINE

## 2019-05-16 RX ADMIN — CYANOCOBALAMIN 1000 MCG: 1000 INJECTION, SOLUTION INTRAMUSCULAR; SUBCUTANEOUS at 14:08

## 2019-05-16 NOTE — NURSING NOTE
The following medication was given:     MEDICATION: Vitamin B12  1000 mcg  ROUTE: IM  SITE: Deltoid - Right  DOSE: 1000 mcg  LOT #: [id number]  :  [employer] [place]  EXPIRATION DATE:  09/01/2020  NDC#: 5412-7861-78    Given by: Haleigh Easley CMA

## 2019-06-14 ENCOUNTER — ALLIED HEALTH/NURSE VISIT (OUTPATIENT)
Dept: FAMILY MEDICINE | Facility: CLINIC | Age: 84
End: 2019-06-14

## 2019-06-14 DIAGNOSIS — E53.9 B-COMPLEX DEFICIENCY: Primary | ICD-10-CM

## 2019-06-14 PROCEDURE — 96372 THER/PROPH/DIAG INJ SC/IM: CPT | Performed by: FAMILY MEDICINE

## 2019-06-14 RX ADMIN — CYANOCOBALAMIN 1000 MCG: 1000 INJECTION, SOLUTION INTRAMUSCULAR; SUBCUTANEOUS at 14:31

## 2019-06-14 NOTE — NURSING NOTE
The following medication was given:     MEDICATION: Vitamin B12  1000 mcg  ROUTE: IM  SITE: Deltoid - Left  DOSE: 1000 mcg   LOT #: [id number]  :  [employer] [place]  EXPIRATION DATE:  11/1/2020  NDC#: 6288-8976-87    Given by: Haleigh Easley CMA

## 2019-06-28 DIAGNOSIS — E11.9 TYPE 2 DIABETES MELLITUS WITHOUT COMPLICATION, WITHOUT LONG-TERM CURRENT USE OF INSULIN (H): ICD-10-CM

## 2019-06-28 DIAGNOSIS — E03.9 HYPOTHYROIDISM, UNSPECIFIED TYPE: ICD-10-CM

## 2019-06-28 RX ORDER — LEVOTHYROXINE SODIUM 112 UG/1
TABLET ORAL
Qty: 30 TABLET | Refills: 0 | Status: SHIPPED | OUTPATIENT
Start: 2019-06-28 | End: 2019-08-07

## 2019-06-28 NOTE — TELEPHONE ENCOUNTER
Pending Prescriptions:                       Disp   Refills    levothyroxine (SYNTHROID/LEVOTHROID) 112 *90 tab*             Sig: Take 1 tablet (112 mcg) by mouth daily    metFORMIN (GLUCOPHAGE) 1000 MG tablet [Ph*180 ta*             Sig: TAKE ONE TABLET BY MOUTH TWICE DAILY WITH MEALS    BJS please review:    Per notes on 1- recheck after memorial weekend  Please change qty and fax or deny and send to   Janae  [phone]    Please call patient and schedule fasting office visit   - thirty day refill only

## 2019-07-03 ENCOUNTER — TRANSFERRED RECORDS (OUTPATIENT)
Dept: FAMILY MEDICINE | Facility: CLINIC | Age: 84
End: 2019-07-03

## 2019-07-10 ENCOUNTER — ALLIED HEALTH/NURSE VISIT (OUTPATIENT)
Dept: FAMILY MEDICINE | Facility: CLINIC | Age: 84
End: 2019-07-10

## 2019-07-10 DIAGNOSIS — E53.9 B-COMPLEX DEFICIENCY: Primary | ICD-10-CM

## 2019-07-10 PROCEDURE — 96372 THER/PROPH/DIAG INJ SC/IM: CPT | Performed by: FAMILY MEDICINE

## 2019-07-10 RX ADMIN — CYANOCOBALAMIN 1000 MCG: 1000 INJECTION, SOLUTION INTRAMUSCULAR; SUBCUTANEOUS at 14:15

## 2019-07-12 DIAGNOSIS — E11.40 TYPE 2 DIABETES MELLITUS WITH DIABETIC NEUROPATHY, WITHOUT LONG-TERM CURRENT USE OF INSULIN (H): ICD-10-CM

## 2019-07-12 DIAGNOSIS — I10 ESSENTIAL HYPERTENSION, BENIGN: ICD-10-CM

## 2019-07-12 RX ORDER — LISINOPRIL 20 MG/1
20 TABLET ORAL DAILY
Qty: 90 TABLET | Refills: 0 | OUTPATIENT
Start: 2019-07-12

## 2019-07-12 NOTE — TELEPHONE ENCOUNTER
Pending Prescriptions:                       Disp   Refills    lisinopril (PRINIVIL/ZESTRIL) 20 MG table*90 tab*0            Sig: Take 1 tablet (20 mg) by mouth daily     Janae  [phone] (home) None (work)  Sent my chart message   Called in 30  Pt was informed of needing ov on 6-

## 2019-07-26 DIAGNOSIS — E11.40 TYPE 2 DIABETES MELLITUS WITH DIABETIC NEUROPATHY, WITHOUT LONG-TERM CURRENT USE OF INSULIN (H): ICD-10-CM

## 2019-07-26 DIAGNOSIS — E78.2 MIXED HYPERLIPIDEMIA: ICD-10-CM

## 2019-07-26 RX ORDER — SIMVASTATIN 20 MG
TABLET ORAL
Qty: 90 TABLET | Refills: 0 | COMMUNITY
Start: 2019-07-26

## 2019-07-26 NOTE — TELEPHONE ENCOUNTER
Trisha Lucia is requesting a refill of:    Refused Prescriptions:                       Disp   Refills    simvastatin (ZOCOR) 20 MG tablet [Pharmacy*90 tab*0        Sig: TAKE 1 TABLET (20 MG) BY MOUTH AT BEDTIME   Refused By: CLARK ZAMBRANO  Reason for Refusal: Patient needs appointment

## 2019-07-29 DIAGNOSIS — E11.9 TYPE 2 DIABETES MELLITUS WITHOUT COMPLICATION, WITHOUT LONG-TERM CURRENT USE OF INSULIN (H): ICD-10-CM

## 2019-07-29 DIAGNOSIS — E03.9 HYPOTHYROIDISM, UNSPECIFIED TYPE: ICD-10-CM

## 2019-07-29 RX ORDER — LEVOTHYROXINE SODIUM 112 UG/1
TABLET ORAL
Qty: 30 TABLET | Refills: 0 | OUTPATIENT
Start: 2019-07-29

## 2019-07-29 NOTE — TELEPHONE ENCOUNTER
Refused Prescriptions:                       Disp   Refills    metFORMIN (GLUCOPHAGE) 1000 MG tablet [Pha*60 tab*0        Sig: TAKE ONE TABLET BY MOUTH TWICE DAILY WITH MEALS   Refused By: JANAE ACUÑA  Reason for Refusal: OTHER    levothyroxine (SYNTHROID/LEVOTHROID) 112 M*30 tab*0        Sig: Take 1 tablet (112 mcg) by mouth daily  Refused By: JANAE ACUÑA  Reason for Refusal: OTHER    Pt recieved  30 on 6-2019  Not on metformin  Due for a fasting ov  Janae  [phone] (home) None (work)

## 2019-08-01 DIAGNOSIS — I10 ESSENTIAL HYPERTENSION, BENIGN: ICD-10-CM

## 2019-08-01 DIAGNOSIS — E78.2 MIXED HYPERLIPIDEMIA: ICD-10-CM

## 2019-08-01 DIAGNOSIS — E11.40 TYPE 2 DIABETES MELLITUS WITH DIABETIC NEUROPATHY, WITHOUT LONG-TERM CURRENT USE OF INSULIN (H): ICD-10-CM

## 2019-08-01 DIAGNOSIS — E03.9 HYPOTHYROIDISM, UNSPECIFIED TYPE: ICD-10-CM

## 2019-08-01 RX ORDER — LEVOTHYROXINE SODIUM 112 UG/1
TABLET ORAL
Qty: 30 TABLET | Refills: 0 | OUTPATIENT
Start: 2019-08-01

## 2019-08-01 RX ORDER — SIMVASTATIN 20 MG
TABLET ORAL
Qty: 90 TABLET | Refills: 0 | OUTPATIENT
Start: 2019-08-01

## 2019-08-01 RX ORDER — LISINOPRIL 20 MG/1
TABLET ORAL
Qty: 30 TABLET | Refills: 0 | OUTPATIENT
Start: 2019-08-01

## 2019-08-01 NOTE — TELEPHONE ENCOUNTER
Refused Prescriptions:                       Disp   Refills    levothyroxine (SYNTHROID/LEVOTHROID) 112 M*30 tab*0        Sig: Take 1 tablet (112 mcg) by mouth daily  Refused By: JANAE ACUÑA  Reason for Refusal: Appt required, please call patient    simvastatin (ZOCOR) 20 MG tablet [Pharmacy*90 tab*0        Sig: TAKE 1 TABLET (20 MG) BY MOUTH AT BEDTIME   Refused By: JANAE ACUÑA  Reason for Refusal: Appt required, please call patient    lisinopril (PRINIVIL/ZESTRIL) 20 MG tablet*30 tab*0        Sig: TAKE ONE TABLET BY MOUTH ONE TIME DAILY   Refused By: JANAE ACUÑA  Reason for Refusal: Appt required, please call patient    Due for a fasting ov  Was sent a my chart on 7-    refused on 7-  Pt recieved  30 on 6-2019  Not on metformin  Due for a fasting ov  Janae  [phone] (home) None (work)

## 2019-08-07 ENCOUNTER — OFFICE VISIT (OUTPATIENT)
Dept: FAMILY MEDICINE | Facility: CLINIC | Age: 84
End: 2019-08-07

## 2019-08-07 VITALS
TEMPERATURE: 98.2 F | DIASTOLIC BLOOD PRESSURE: 62 MMHG | HEART RATE: 64 BPM | WEIGHT: 180.8 LBS | BODY MASS INDEX: 33.61 KG/M2 | OXYGEN SATURATION: 97 % | SYSTOLIC BLOOD PRESSURE: 108 MMHG

## 2019-08-07 DIAGNOSIS — E78.2 MIXED HYPERLIPIDEMIA: ICD-10-CM

## 2019-08-07 DIAGNOSIS — E53.9 B-COMPLEX DEFICIENCY: ICD-10-CM

## 2019-08-07 DIAGNOSIS — F41.1 GAD (GENERALIZED ANXIETY DISORDER): ICD-10-CM

## 2019-08-07 DIAGNOSIS — I10 ESSENTIAL HYPERTENSION, BENIGN: ICD-10-CM

## 2019-08-07 DIAGNOSIS — E03.9 HYPOTHYROIDISM, UNSPECIFIED TYPE: ICD-10-CM

## 2019-08-07 DIAGNOSIS — E11.40 TYPE 2 DIABETES MELLITUS WITH DIABETIC NEUROPATHY, WITHOUT LONG-TERM CURRENT USE OF INSULIN (H): Primary | ICD-10-CM

## 2019-08-07 LAB
ALBUMIN URINE MG/G CR: <30 MG/G CREATININE
ALBUMIN URINE MG/SPEC: 30
CREATININE URINE: 300
HBA1C MFR BLD: 6.8 % (ref 4–7)

## 2019-08-07 PROCEDURE — 99214 OFFICE O/P EST MOD 30 MIN: CPT | Performed by: FAMILY MEDICINE

## 2019-08-07 PROCEDURE — 36415 COLL VENOUS BLD VENIPUNCTURE: CPT | Performed by: FAMILY MEDICINE

## 2019-08-07 PROCEDURE — 84460 ALANINE AMINO (ALT) (SGPT): CPT | Performed by: FAMILY MEDICINE

## 2019-08-07 PROCEDURE — 82043 UR ALBUMIN QUANTITATIVE: CPT | Performed by: FAMILY MEDICINE

## 2019-08-07 PROCEDURE — 83036 HEMOGLOBIN GLYCOSYLATED A1C: CPT | Performed by: FAMILY MEDICINE

## 2019-08-07 PROCEDURE — 80048 BASIC METABOLIC PNL TOTAL CA: CPT | Performed by: FAMILY MEDICINE

## 2019-08-07 RX ORDER — CITALOPRAM HYDROBROMIDE 20 MG/1
20 TABLET ORAL DAILY
Qty: 90 TABLET | Refills: 1 | Status: SHIPPED | OUTPATIENT
Start: 2019-08-07 | End: 2020-01-07

## 2019-08-07 RX ORDER — SIMVASTATIN 20 MG
TABLET ORAL
Qty: 90 TABLET | Refills: 1 | Status: SHIPPED | OUTPATIENT
Start: 2019-08-07 | End: 2020-01-07

## 2019-08-07 RX ORDER — LEVOTHYROXINE SODIUM 112 UG/1
TABLET ORAL
Qty: 90 TABLET | Refills: 3 | Status: SHIPPED | OUTPATIENT
Start: 2019-08-07 | End: 2020-08-31

## 2019-08-07 RX ORDER — LISINOPRIL 20 MG/1
20 TABLET ORAL DAILY
Qty: 90 TABLET | Refills: 1 | Status: SHIPPED | OUTPATIENT
Start: 2019-08-07 | End: 2020-01-07

## 2019-08-07 RX ADMIN — CYANOCOBALAMIN 1000 MCG: 1000 INJECTION, SOLUTION INTRAMUSCULAR; SUBCUTANEOUS at 15:46

## 2019-08-07 ASSESSMENT — ANXIETY QUESTIONNAIRES
2. NOT BEING ABLE TO STOP OR CONTROL WORRYING: SEVERAL DAYS
5. BEING SO RESTLESS THAT IT IS HARD TO SIT STILL: NOT AT ALL
1. FEELING NERVOUS, ANXIOUS, OR ON EDGE: NOT AT ALL
IF YOU CHECKED OFF ANY PROBLEMS ON THIS QUESTIONNAIRE, HOW DIFFICULT HAVE THESE PROBLEMS MADE IT FOR YOU TO DO YOUR WORK, TAKE CARE OF THINGS AT HOME, OR GET ALONG WITH OTHER PEOPLE: NOT DIFFICULT AT ALL
7. FEELING AFRAID AS IF SOMETHING AWFUL MIGHT HAPPEN: SEVERAL DAYS
3. WORRYING TOO MUCH ABOUT DIFFERENT THINGS: SEVERAL DAYS
6. BECOMING EASILY ANNOYED OR IRRITABLE: NOT AT ALL
GAD7 TOTAL SCORE: 3

## 2019-08-07 ASSESSMENT — PATIENT HEALTH QUESTIONNAIRE - PHQ9
SUM OF ALL RESPONSES TO PHQ QUESTIONS 1-9: 6
5. POOR APPETITE OR OVEREATING: NOT AT ALL

## 2019-08-07 NOTE — NURSING NOTE
Chief Complaint   Patient presents with     Recheck Medication     recheck of medication, A1C check, B12 shot?     Pre-visit Screening:  Immunizations:  up to date  Colonoscopy:  is up to date  Mammogram: is up to date  Asthma Action Test/Plan:  NA  PHQ9:  PHQ-9 done today   GAD7:  Done today  Questioned patient about current smoking habits Pt. quit smoking some time ago.  Ok to leave detailed message on voice mail for today's visit only yes, phone # [phone]

## 2019-08-07 NOTE — PROGRESS NOTES
Subjective     Trisha Lucia is a 83 year old female who presents to clinic today for the following health issues:    Physicians involved in her care:  Dr Angela- neurologist (back pain)  Dr Julio César Dubon- eyes  [facility] : gets vertigo intermittently    HPI   Diabetes Follow-up      How often are you checking your blood sugar? Not at all    What symptoms do you notice when your blood sugar is low?  None    What concerns do you have today about your diabetes? None     Do you have any of these symptoms? (Select all that apply)  Neuropathy      Have you had a diabetic eye exam in the last 12 months? Up to date/ also has glaucoma    BP Readings from Last 2 Encounters:   01/11/19 146/70   06/13/18 134/72     Hemoglobin A1C (%)   Date Value   01/11/2019 6.5   06/13/2018 6.4     LDL Cholesterol Calculated (mg/dL (calc))   Date Value   01/11/2019 70   12/28/2017 76       Diabetes Management Resources  Hyperlipidemia Follow-Up      Are you having any of the following symptoms? (Select all that apply)  No complaints of shortness of breath, chest pain or pressure.  No increased sweating or nausea with activity.  No left-sided neck or arm pain.  No complaints of pain in calves when walking 1-2 blocks.     Are you regularly taking any medication or supplement to lower your cholesterol?   Yes- Simvastatin    Are you having muscle aches or other side effects that you think could be caused by your cholesterol lowering medication?  No      Hypertension Follow-up      Do you check your blood pressure regularly outside of the clinic? No     Are you following a low salt diet? No added salt- family brings home cooked meal    Are your blood pressures ever more than 140 on the top number (systolic) OR more   than 90 on the bottom number (diastolic), for example 140/90? No recorded blood pressures  Anxiety Follow-Up    How are you doing with your anxiety since your last visit? No change    Are you having other symptoms  "that might be associated with anxiety? No    Have you had a significant life event?      - good family support- enjoys going to the family cabin    Are you feeling depressed? No    Do you have any concerns with your use of alcohol or other drugs? No     \"feels lazy\"- spends a lot of time in bed    Social History     Tobacco Use     Smoking status: Never Smoker     Smokeless tobacco: Never Used   Substance Use Topics     Alcohol use: No     Alcohol/week: 0.0 oz     Comment: 0     Drug use: No     SONYA-7 SCORE 11/2/2017 1/11/2019 8/7/2019   Total Score 16 3 3     PHQ 12/28/2017 8/7/2019   PHQ-9 Total Score 9 6   Q9: Thoughts of better off dead/self-harm past 2 weeks Not at all Not at all     PHQ9 scores 6  SONYA 7 scores  3    Hypothyroidism Follow-up      Since last visit, patient describes the following symptoms: Weight stable, no hair loss, no skin changes, no constipation, no loose stools      Amount of exercise or physical activity: limited by spinal stenosis- can only walk behind a cart- walker    Problems taking medications regularly: No    Medication side effects: none    Diet: regular (no restrictions)- tries to minimize processed food      Patient Active Problem List   Diagnosis     Essential hypertension, benign     Other specified idiopathic peripheral neuropathy     Hypothyroidism     Osteoarthrosis, unspecified whether generalized or localized, involving lower leg     Obesity     Irritable bowel syndrome     Mixed hyperlipidemia     ANNA (obstructive sleep apnea)     Health Care Home     B-complex deficiency     Hallux valgus with bunions     ACP (advance care planning)     Type 2 diabetes mellitus with diabetic neuropathy (H)     Pernicious anemia     Spinal stenosis     SONYA (generalized anxiety disorder)     Past Surgical History:   Procedure Laterality Date     BUNIONECTOMY  4/9/2013    Procedure: BUNIONECTOMY;  RIGHT GREAT CLAWTOE CORRECTION WITH TENDON TRANSFER, ENDOSCOPIC RECESSION OF " GASTRONEMIUS, DEBRIDEMENT OF ULCER ON SOLE OF RIGHT FOOT;  Surgeon: Stephon Rae MD;  Location: [facility]     ENDOSCOPIC RECESSION GASTROCNEMIUS (DONTA)  4/9/2013    Procedure: ENDOSCOPIC RECESSION GASTROCNEMIUS (DONTA);;  Surgeon: Stephon Rae MD;  Location[facility] COLONOSCOPY THRU STOMA, DIAGNOSTIC  2001     HC DEBRIDMENT MASTOID CAVITY, SIMPLE      L ear     HC KNEE SCOPE, DIAGNOSTIC  1997    Arthroscopy, Knee     HC KNEE SCOPE, DIAGNOSTIC  2001    Arthroscopy, Knee     HC OPEN TX TRIMALLEOLAR ANKLE FX W FIX PST LIP Right 2014     HC REPAIR OF HAMMERTOE,ONE  2015    [facility]     HERNIORRHAPHY INCISIONAL (LOCATION) N/A 1/13/2017    Procedure: HERNIORRHAPHY INCISIONAL (LOCATION);  Surgeon: Joaquin Skaggs MD;  Location: [facility]     IRRIGATION AND DEBRIDEMENT FOOT, COMBINED  4/9/2013    Procedure: COMBINED IRRIGATION AND DEBRIDEMENT FOOT;  Debridement of sole ulcer right foot;  Surgeon: Stephon Rae MD;  Location: [facility]     KNEE SURGERY  2011    right total-Dr. Gorman     Small bowel obstruction  6/2011    Small-bowel obstruction, status post laparotomy with lysis of adhesions, small bowel resection with primary enteroenterostomy and appendectomy       Social History     Tobacco Use     Smoking status: Never Smoker     Smokeless tobacco: Never Used   Substance Use Topics     Alcohol use: No     Alcohol/week: 0.0 oz     Comment: 0     Family History   Problem Relation Age of Onset     C.A.D. Mother      Diabetes No family hx of      Hypertension Mother      Breast Cancer No family hx of      Cancer - colorectal No family hx of          Current Outpatient Medications   Medication Sig Dispense Refill     aspirin 81 MG EC tablet Take 1 tablet by mouth daily. 90 tablet 3     citalopram (CELEXA) 20 MG tablet Take 1 tablet (20 mg) by mouth daily 90 tablet 1     gabapentin (NEURONTIN) 300 MG capsule Take 300 mg by mouth every evening        HYDROcodone-acetaminophen (NORCO) 5-325  MG per tablet Take 1-2 tablets by mouth every 4 hours as needed for other (Moderate to Severe Pain) 30 tablet 0     hyoscyamine (ANASPAZ/LEVSIN) 0.125 MG tablet Take 1 tablet (125 mcg) by mouth 4 times daily (before meals and nightly) 30 tablet 1     levothyroxine (SYNTHROID/LEVOTHROID) 112 MCG tablet Take 1 tablet (112 mcg) by mouth daily 90 tablet 3     lisinopril (PRINIVIL/ZESTRIL) 20 MG tablet Take 1 tablet (20 mg) by mouth daily 90 tablet 1     metFORMIN (GLUCOPHAGE) 1000 MG tablet Take 1 tablet (1,000 mg) by mouth 2 times daily (with meals) 180 tablet 1     Multiple Vitamins-Minerals (SENIOR MULTIVITAMIN PLUS) TABS Take 1 tablet by mouth daily        OXYCONTIN 10 MG 12 hr tablet Take 10 mg by mouth At Bedtime   0     pregabalin (LYRICA) 150 MG capsule Take 150 mg by mouth 3 times daily.       simvastatin (ZOCOR) 20 MG tablet TAKE 1 TABLET (20 MG) BY MOUTH AT BEDTIME 90 tablet 1     VITAMIN D, CHOLECALCIFEROL, PO Take 1,000 Units by mouth daily       meclizine (ANTIVERT) 25 MG tablet TAKE ONE TABLET BY MOUTH EVERY SIX HOURS AS NEEDED FOR DIZZINESS (Patient not taking: Reported on 8/7/2019) 30 tablet 0        Reviewed and updated as needed this visit by Provider         Review of Systems   ROS COMP: Constitutional, HEENT, cardiovascular, pulmonary, GI, , musculoskeletal, neuro, skin, endocrine and psych systems are negative, except as otherwise noted.        Objective    /62 (BP Location: Left arm, Patient Position: Sitting, Cuff Size: Adult Large)   Pulse 64   Temp 98.2  F (36.8  C) (Oral)   Wt 82 kg (180 lb 12.8 oz)   SpO2 97%   BMI 33.61 kg/m    There is no height or weight on file to calculate BMI.  Physical Exam   GENERAL: healthy, alert and no distress  NECK: no adenopathy, no asymmetry, masses, or scars and thyroid normal to palpation  RESP: lungs clear to auscultation - no rales, rhonchi or wheezes  CV: regular rate and rhythm,systolic ejection  murmur,   no peripheral edema   MS: no gross  musculoskeletal defects noted, no edema  PSYCH: ALert and oriented times 3; Coherent speech, normal rate and volume, able to articulate, logical thoughts, able to abstract reason, no tangential thoughts, no hallucinations or delusions.. Affect is pleasant         Diagnostic Test Results:  Results for orders placed or performed in visit on 08/07/19   Albumin Random Urine Quantitative with Creat Ratio   Result Value Ref Range    Albumin Urine mg/spec 30 (A) <30    Albumin Urine mg/g Cr <30 <30 MG/G Creatinine    Creatinine Urine 300 (A) <300   ALT ([facility])   Result Value Ref Range    ALT 2 (A) 5 - 30 U/L   Basic Metabolic Panel ([facility])   Result Value Ref Range    Carbon Dioxide 28.7 20 - 32 mmol/L    Creatinine 0.81 0.70 - 1.18 mg/dL    Glucose 130 (A) 60 - 99 mg/dL    Sodium 142.7 135 - 146 mmol/L    Potassium 5.33 (A) 3.5 - 5.3 mmol/L    Chloride 107.1 98 - 110 mmol/L    Urea Nitrogen 26 (A) 7 - 25 mg/dL    Calcium 9.4 8.6 - 10.3 mg/dL    BUN/Creatinine Ratio 32.1 (A) 6 - 22   Hemoglobin A1c ([facility])   Result Value Ref Range    Hemoglobin A1C 6.8 4.0 - 7.0 %   TSH ([facility])   Result Value Ref Range    TSH 0.74 0.40 - 4.50 mIU/L   T4 free   Result Value Ref Range    T4 Free, Non-Dialysis 1.3 0.8 - 1.8 ng/dL   VITAMIN B12 ([facility])   Result Value Ref Range    Vitamin B12 372 200 - 1,100 pg/mL           Assessment & Plan    (E11.40) Type 2 diabetes mellitus with diabetic neuropathy, without long-term current use of insulin (H)  (primary encounter diagnosis)  Comment:  Hemoglobin A1C has increased to 6.8- remains at goal  Recommended increasing activity if able- avoid sweets-  Repeat Hemoglobin A1C in six months  Plan: lisinopril (PRINIVIL/ZESTRIL) 20 MG tablet,         metFORMIN (GLUCOPHAGE) 1000 MG tablet,         simvastatin (ZOCOR) 20 MG tablet, Albumin         Random Urine Quantitative with Creat Ratio, ALT        ([facility]), Basic Metabolic Panel ([facility]), Hemoglobin         A1c ([facility]), VENOUS COLLECTION        (F41.1) SONYA  (generalized anxiety disorder)  Comment:  Refill of current dose- wants to stay at her cabin alone- family is not supportive  - wishes to stay in her home  Plan: citalopram (CELEXA) 20 MG tablet            (E03.9) Hypothyroidism, unspecified type  Comment: thyroid function in therapeutic range- Levothyroxine refilled at current dose  Plan: levothyroxine (SYNTHROID/LEVOTHROID) 112 MCG         tablet, TSH ([facility]), T4 free, VENOUS COLLECTION            (I10) Essential hypertension, benign  Comment: at goal- refill of current dose  Plan: lisinopril (PRINIVIL/ZESTRIL) 20 MG tablet,         Basic Metabolic Panel ([facility]), VENOUS COLLECTION            (E78.2) Mixed hyperlipidemia  Comment: LDL at goal  LDL Cholesterol Calculated   Date Value Ref Range Status   01/11/2019 70 mg/dL (calc) Final     Comment:     Reference range: <100     Desirable range <100 mg/dL for primary prevention;    <70 mg/dL for patients with CHD or diabetic patients   with > or = 2 CHD risk factors.     LDL-C is now calculated using the Ramon   calculation, which is a validated novel method providing   better accuracy than the Friedewald equation in the   estimation of LDL-C.   Lane HILARIO et al. JULIOCESAR. 2013;310(19): 0402-4669   (http://education."[employer]"/faq/UNC647)       Plan: simvastatin (ZOCOR) 20 MG tablet, ALT ([facility]),         VENOUS COLLECTION             (E53.9) B-complex deficiency  Comment:  Increase dose- B12 in low normal range  Plan: VITAMIN B12 ([facility]), VENOUS COLLECTION         FUTURE APPOINTMENTS:       - Follow-up visit in 6 months  Medications were refilled at her current doses  Take B12 supplement  No follow-ups on file.    Judith Walker MD  [facility]

## 2019-08-08 LAB
ALT 1742-6: 2 U/L (ref 5–30)
BUN SERPL-MCNC: 26 MG/DL (ref 7–25)
BUN/CREATININE RATIO: 32.1 (ref 6–22)
CALCIUM SERPL-MCNC: 9.4 MG/DL (ref 8.6–10.3)
CHLORIDE SERPLBLD-SCNC: 107.1 MMOL/L (ref 98–110)
CO2 SERPL-SCNC: 28.7 MMOL/L (ref 20–32)
CREAT SERPL-MCNC: 0.81 MG/DL (ref 0.7–1.18)
GLUCOSE SERPL-MCNC: 130 MG/DL (ref 60–99)
POTASSIUM SERPL-SCNC: 5.33 MMOL/L (ref 3.5–5.3)
SODIUM SERPL-SCNC: 142.7 MMOL/L (ref 135–146)
T4, FREE, NON-DIALYSIS - QUEST: 1.3 NG/DL (ref 0.8–1.8)
TSH SERPL-ACNC: 0.74 MIU/L (ref 0.4–4.5)
VIT B12 SERPL-MCNC: 372 PG/ML (ref 200–1100)

## 2019-08-08 ASSESSMENT — ANXIETY QUESTIONNAIRES: GAD7 TOTAL SCORE: 3

## 2019-09-11 ENCOUNTER — ALLIED HEALTH/NURSE VISIT (OUTPATIENT)
Dept: FAMILY MEDICINE | Facility: CLINIC | Age: 84
End: 2019-09-11

## 2019-09-11 DIAGNOSIS — Z23 NEED FOR VACCINATION: Primary | ICD-10-CM

## 2019-09-11 PROCEDURE — G0008 ADMIN INFLUENZA VIRUS VAC: HCPCS | Performed by: FAMILY MEDICINE

## 2019-09-11 PROCEDURE — 90686 IIV4 VACC NO PRSV 0.5 ML IM: CPT | Performed by: FAMILY MEDICINE

## 2019-09-11 RX ADMIN — CYANOCOBALAMIN 1000 MCG: 1000 INJECTION, SOLUTION INTRAMUSCULAR; SUBCUTANEOUS at 15:03

## 2019-09-27 ENCOUNTER — HEALTH MAINTENANCE LETTER (OUTPATIENT)
Age: 84
End: 2019-09-27

## 2019-10-09 ENCOUNTER — ALLIED HEALTH/NURSE VISIT (OUTPATIENT)
Dept: FAMILY MEDICINE | Facility: CLINIC | Age: 84
End: 2019-10-09

## 2019-10-09 DIAGNOSIS — E53.9 B-COMPLEX DEFICIENCY: Primary | ICD-10-CM

## 2019-10-09 PROCEDURE — 96372 THER/PROPH/DIAG INJ SC/IM: CPT | Performed by: FAMILY MEDICINE

## 2019-10-09 RX ADMIN — CYANOCOBALAMIN 1000 MCG: 1000 INJECTION, SOLUTION INTRAMUSCULAR; SUBCUTANEOUS at 15:06

## 2019-11-04 ENCOUNTER — TRANSFERRED RECORDS (OUTPATIENT)
Dept: FAMILY MEDICINE | Facility: CLINIC | Age: 84
End: 2019-11-04

## 2019-11-14 DIAGNOSIS — E53.9 B-COMPLEX DEFICIENCY: Primary | ICD-10-CM

## 2019-11-14 PROCEDURE — 96372 THER/PROPH/DIAG INJ SC/IM: CPT | Performed by: FAMILY MEDICINE

## 2019-11-14 RX ADMIN — CYANOCOBALAMIN 1000 MCG: 1000 INJECTION, SOLUTION INTRAMUSCULAR; SUBCUTANEOUS at 14:51

## 2019-11-14 NOTE — NURSING NOTE
The following medication was given:     MEDICATION: Vitamin B12  1000mcg  ROUTE: IM  SITE: Deltoid - Left  DOSE: 1ML  LOT #: [id number]  :  [employer]  EXPIRATION DATE:  01/01/21  NDC#: 4419-7645-02

## 2019-12-11 ENCOUNTER — ALLIED HEALTH/NURSE VISIT (OUTPATIENT)
Dept: FAMILY MEDICINE | Facility: CLINIC | Age: 84
End: 2019-12-11

## 2019-12-11 DIAGNOSIS — E53.9 B-COMPLEX DEFICIENCY: Primary | ICD-10-CM

## 2019-12-11 PROCEDURE — 96372 THER/PROPH/DIAG INJ SC/IM: CPT | Performed by: FAMILY MEDICINE

## 2019-12-11 RX ADMIN — CYANOCOBALAMIN 1000 MCG: 1000 INJECTION, SOLUTION INTRAMUSCULAR; SUBCUTANEOUS at 16:15

## 2019-12-11 NOTE — NURSING NOTE
Trisha is here today for her Vitamin B12 injection.    The following medication was given:     MEDICATION: Vitamin B12  1000mcg  ROUTE: IM  SITE: Deltoid - Right  DOSE: 1ML  LOT #: [id number]  :  [employer]  EXPIRATION DATE:  01/01/21  NDC#: 4755-1055-46

## 2020-01-07 ENCOUNTER — OFFICE VISIT (OUTPATIENT)
Dept: FAMILY MEDICINE | Facility: CLINIC | Age: 85
End: 2020-01-07

## 2020-01-07 VITALS
SYSTOLIC BLOOD PRESSURE: 134 MMHG | TEMPERATURE: 97.8 F | DIASTOLIC BLOOD PRESSURE: 64 MMHG | HEIGHT: 61 IN | RESPIRATION RATE: 20 BRPM | HEART RATE: 64 BPM | BODY MASS INDEX: 33.46 KG/M2 | WEIGHT: 177.2 LBS

## 2020-01-07 DIAGNOSIS — E11.40 TYPE 2 DIABETES MELLITUS WITH DIABETIC NEUROPATHY, WITHOUT LONG-TERM CURRENT USE OF INSULIN (H): ICD-10-CM

## 2020-01-07 DIAGNOSIS — I10 ESSENTIAL HYPERTENSION, BENIGN: ICD-10-CM

## 2020-01-07 DIAGNOSIS — F41.1 GAD (GENERALIZED ANXIETY DISORDER): ICD-10-CM

## 2020-01-07 DIAGNOSIS — E78.2 MIXED HYPERLIPIDEMIA: ICD-10-CM

## 2020-01-07 DIAGNOSIS — E53.9 B-COMPLEX DEFICIENCY: ICD-10-CM

## 2020-01-07 LAB
ALT 1742-6: 3 U/L (ref 0–32)
BUN SERPL-MCNC: 20 MG/DL (ref 7–25)
BUN/CREATININE RATIO: 24.7
CALCIUM SERPL-MCNC: 9.9 MG/DL (ref 8.6–10.3)
CHLORIDE SERPLBLD-SCNC: 105.1 MMOL/L (ref 98–110)
CHOLEST SERPL-MCNC: 162 MG/DL (ref 0–199)
CHOLEST/HDLC SERPL: 3 {RATIO} (ref 0–5)
CO2 SERPL-SCNC: 25.9 MMOL/L (ref 20–32)
CREAT SERPL-MCNC: 0.81 MG/DL (ref 0.7–1.18)
GLUCOSE SERPL-MCNC: 133 MG/DL (ref 60–99)
HBA1C MFR BLD: 6.5 % (ref 4–7)
HDLC SERPL-MCNC: 53 MG/DL (ref 40–150)
LDLC SERPL CALC-MCNC: 83 MG/DL (ref 0–130)
POTASSIUM SERPL-SCNC: 4.77 MMOL/L (ref 3.5–5.3)
SODIUM SERPL-SCNC: 143.5 MMOL/L (ref 135–146)
TRIGL SERPL-MCNC: 131 MG/DL (ref 0–149)

## 2020-01-07 PROCEDURE — 80061 LIPID PANEL: CPT | Performed by: FAMILY MEDICINE

## 2020-01-07 PROCEDURE — 36415 COLL VENOUS BLD VENIPUNCTURE: CPT | Performed by: FAMILY MEDICINE

## 2020-01-07 PROCEDURE — 83036 HEMOGLOBIN GLYCOSYLATED A1C: CPT | Performed by: FAMILY MEDICINE

## 2020-01-07 PROCEDURE — 99214 OFFICE O/P EST MOD 30 MIN: CPT | Performed by: FAMILY MEDICINE

## 2020-01-07 PROCEDURE — 84460 ALANINE AMINO (ALT) (SGPT): CPT | Performed by: FAMILY MEDICINE

## 2020-01-07 PROCEDURE — 80048 BASIC METABOLIC PNL TOTAL CA: CPT | Performed by: FAMILY MEDICINE

## 2020-01-07 RX ORDER — LISINOPRIL 20 MG/1
20 TABLET ORAL DAILY
Qty: 90 TABLET | Refills: 3 | Status: SHIPPED | OUTPATIENT
Start: 2020-01-07 | End: 2020-08-19

## 2020-01-07 RX ORDER — CITALOPRAM HYDROBROMIDE 20 MG/1
20 TABLET ORAL DAILY
Qty: 90 TABLET | Refills: 3 | Status: SHIPPED | OUTPATIENT
Start: 2020-01-07 | End: 2021-02-09

## 2020-01-07 RX ORDER — SIMVASTATIN 20 MG
TABLET ORAL
Qty: 90 TABLET | Refills: 3 | Status: SHIPPED | OUTPATIENT
Start: 2020-01-07 | End: 2020-08-19

## 2020-01-07 RX ADMIN — CYANOCOBALAMIN 1000 MCG: 1000 INJECTION, SOLUTION INTRAMUSCULAR; SUBCUTANEOUS at 12:53

## 2020-01-07 ASSESSMENT — PATIENT HEALTH QUESTIONNAIRE - PHQ9: SUM OF ALL RESPONSES TO PHQ QUESTIONS 1-9: 4

## 2020-01-07 ASSESSMENT — MIFFLIN-ST. JEOR: SCORE: 1194.32

## 2020-01-07 NOTE — NURSING NOTE
Trisha Lucia is here for a medication check and Vit B12 injection.    Questioned patient about current smoking habits.  Pt. has never smoked.  PULSE regular  My Chart: active  CLASSIFICATION OF OVERWEIGHT AND OBESITY BY BMI                        Obesity Class           BMI(kg/m2)  Underweight                                    < 18.5  Normal                                         18.5-24.9  Overweight                                     25.0-29.9  OBESITY                     I                  30.0-34.9                             II                 35.0-39.9  EXTREME OBESITY             III                >40                            Patient's  BMI Body mass index is 33.26 kg/m .  http://hin.nhlbi.nih.gov/menuplanner/menu.cgi  Pre-visit planning  Immunizations - up to date  Colonoscopy -   Mammogram -   Asthma -   PHQ9 -    SONYA-7 -

## 2020-01-07 NOTE — PROGRESS NOTES
Subjective     Trisha Lucia is a 84 year old female who presents to clinic today for the following health issues:    HPI   Hypertension Follow-up      Do you check your blood pressure regularly outside of the clinic? No     Are you following a low salt diet? Yes she or her children cook- no added salt    Are your blood pressures ever more than 140 on the top number (systolic) OR more   than 90 on the bottom number (diastolic), for example 140/90? No    Depression - Anxiety Followup    How are you doing with your depression since your last visit? No change    PHQ 9 scores 4    GAD7 not completed- she denies anxiety symptoms. Normal sleep    More cranky with her kids- they want her to make changes that she is not willing to: ie move from her home- not drive to her cabin alone-     Has steps in her home- she is careful    Are you having other symptoms that might be associated with depression? No    Have you had a significant life event?  OTHER: grandson is ill     Are you feeling anxious or having panic attacks?   No    Do you have any concerns with your use of alcohol or other drugs? No     She has failed a trial of a lower dose- of citalopram  Felt horrible with discontinuing citalopram upon advice of neurology- wishes to continue her current dose    Social History     Tobacco Use     Smoking status: Never Smoker     Smokeless tobacco: Never Used   Substance Use Topics     Alcohol use: No     Alcohol/week: 0.0 standard drinks     Comment: 0     Drug use: No     PHQ 12/28/2017 8/7/2019   PHQ-9 Total Score 9 6   Q9: Thoughts of better off dead/self-harm past 2 weeks Not at all Not at all     SONYA-7 SCORE 11/2/2017 1/11/2019 8/7/2019   Total Score 16 3 3     Last PHQ-9 1/7/2020   1.  Little interest or pleasure in doing things 1   2.  Feeling down, depressed, or hopeless 1   3.  Trouble falling or staying asleep, or sleeping too much 1   4.  Feeling tired or having little energy 1   5.  Poor appetite or overeating 0    6.  Feeling bad about yourself 0   7.  Trouble concentrating 0   8.  Moving slowly or restless 0   Q9: Thoughts of better off dead/self-harm past 2 weeks 0   PHQ-9 Total Score 4   Difficulty at work, home, or with people Somewhat difficult     Took an abnormal amount of time to complete written PHQ9- she states she could read the questions without difficulty with her glasses      How many days per week do you miss taking your medication? 0    Diabetes Follow-up: controlled with diet and exercise and metformin      How often are you checking your blood sugar? Not at all    What concerns do you have today about your diabetes? None     Do you have any of these symptoms? (Select all that apply)   Neuropathy- takes hydrocodone and oxycontin per neurology     Have you had a diabetic eye exam in the last 12 months? Eye exams every 6 months- glaucoma- no diabetic retinopathy    [facility]ks [facility]- sometimes twice around with her son  Home cooked meals brought to her by her daughter  She reports all her needs are met in her home- has railings on stairs, etc    BP Readings from Last 2 Encounters:   01/07/20 134/64   08/07/19 108/62     Hemoglobin A1C (%)   Date Value   08/07/2019 6.8   01/11/2019 6.5     LDL Cholesterol Calculated (mg/dL (calc))   Date Value   01/11/2019 70   12/28/2017 76       Diabetes Management Resources      Patient Active Problem List   Diagnosis     Essential hypertension, benign     Other specified idiopathic peripheral neuropathy     Hypothyroidism     Osteoarthrosis, unspecified whether generalized or localized, involving lower leg     Obesity     Irritable bowel syndrome     Mixed hyperlipidemia     ANNA (obstructive sleep apnea)     Health Care Home     B-complex deficiency     Hallux valgus with bunions     ACP (advance care planning)     Type 2 diabetes mellitus with diabetic neuropathy (H)     Pernicious anemia     Spinal stenosis     SONYA (generalized anxiety disorder)     Past Surgical  History:   Procedure Laterality Date     BUNIONECTOMY  4/9/2013    Procedure: BUNIONECTOMY;  RIGHT GREAT CLAWTOE CORRECTION WITH TENDON TRANSFER, ENDOSCOPIC RECESSION OF GASTRONEMIUS, DEBRIDEMENT OF ULCER ON SOLE OF RIGHT FOOT;  Surgeon: Stephon Rae MD;  Location: [facility]     ENDOSCOPIC RECESSION GASTROCNEMIUS (DONTA)  4/9/2013    Procedure: ENDOSCOPIC RECESSION GASTROCNEMIUS (DONTA);;  Surgeon: Stephon Rae MD;  Location: [facility] COLONOSCOPY THRU STOMA, DIAGNOSTIC  2001     HC DEBRIDMENT MASTOID CAVITY, SIMPLE      L ear     HC KNEE SCOPE, DIAGNOSTIC  1997    Arthroscopy, Knee     HC KNEE SCOPE, DIAGNOSTIC  2001    Arthroscopy, Knee     HC OPEN TX TRIMALLEOLAR ANKLE FX W FIX PST LIP Right 2014     HC REPAIR OF HAMMERTOE,ONE  2015    [facility]     HERNIORRHAPHY INCISIONAL (LOCATION) N/A 1/13/2017    Procedure: HERNIORRHAPHY INCISIONAL (LOCATION);  Surgeon: Joaquin Skaggs MD;  Location: [facility]     IRRIGATION AND DEBRIDEMENT FOOT, COMBINED  4/9/2013    Procedure: COMBINED IRRIGATION AND DEBRIDEMENT FOOT;  Debridement of sole ulcer right foot;  Surgeon: Stephon Rae MD;  Location: [facility]     KNEE SURGERY  2011    right total-Dr. Gormna     Small bowel obstruction  6/2011    Small-bowel obstruction, status post laparotomy with lysis of adhesions, small bowel resection with primary enteroenterostomy and appendectomy       Social History     Tobacco Use     Smoking status: Never Smoker     Smokeless tobacco: Never Used   Substance Use Topics     Alcohol use: No     Alcohol/week: 0.0 standard drinks     Comment: 0     Family History   Problem Relation Age of Onset     C.A.D. Mother      Diabetes No family hx of      Hypertension Mother      Breast Cancer No family hx of      Cancer - colorectal No family hx of          Current Outpatient Medications   Medication Sig Dispense Refill     aspirin 81 MG EC tablet Take 1 tablet by mouth daily. 90 tablet 3     citalopram (CELEXA) 20  MG tablet Take 1 tablet (20 mg) by mouth daily 90 tablet 3     gabapentin (NEURONTIN) 300 MG capsule Take 300 mg by mouth every evening        HYDROcodone-acetaminophen (NORCO) 5-325 MG per tablet Take 1-2 tablets by mouth every 4 hours as needed for other (Moderate to Severe Pain) 30 tablet 0     hyoscyamine (ANASPAZ/LEVSIN) 0.125 MG tablet Take 1 tablet (125 mcg) by mouth 4 times daily (before meals and nightly) 30 tablet 1     levothyroxine (SYNTHROID/LEVOTHROID) 112 MCG tablet Take 1 tablet (112 mcg) by mouth daily 90 tablet 3     lisinopril (PRINIVIL/ZESTRIL) 20 MG tablet Take 1 tablet (20 mg) by mouth daily 90 tablet 3     meclizine (ANTIVERT) 25 MG tablet TAKE ONE TABLET BY MOUTH EVERY SIX HOURS AS NEEDED FOR DIZZINESS 30 tablet 0     metFORMIN (GLUCOPHAGE) 1000 MG tablet Take 1 tablet (1,000 mg) by mouth 2 times daily (with meals) 180 tablet 1     Multiple Vitamins-Minerals (SENIOR MULTIVITAMIN PLUS) TABS Take 1 tablet by mouth daily        OXYCONTIN 10 MG 12 hr tablet Take 10 mg by mouth At Bedtime   0     pregabalin (LYRICA) 150 MG capsule Take 150 mg by mouth 3 times daily.       simvastatin (ZOCOR) 20 MG tablet TAKE 1 TABLET (20 MG) BY MOUTH AT BEDTIME 90 tablet 3     VITAMIN D, CHOLECALCIFEROL, PO Take 1,000 Units by mouth daily       BP Readings from Last 3 Encounters:   01/07/20 134/64   08/07/19 108/62   01/11/19 146/70    Wt Readings from Last 3 Encounters:   01/07/20 80.4 kg (177 lb 3.2 oz)   08/07/19 82 kg (180 lb 12.8 oz)   01/11/19 81.6 kg (180 lb)            Reviewed and updated as needed this visit by Provider          Review of Systems   ROS COMP: Constitutional, HEENT, cardiovascular, pulmonary, GI, , musculoskeletal, neuro, skin, endocrine and psych systems are negative, except as otherwise noted.    Weight down 3.5 pounds , intentional- trying to eat healthier    Chronic pain-   Sees Dr Angela- neuropathy has been stable- spinal stenosis (MRI and cortisone injections per Dr Bennett)  She  "saw a spine specialist at [facility] for spinal injection- little change in symptoms  Second injection from Dr Bey- left foot neuropathy is better  Takes opiates      Objective    /64 (BP Location: Right arm, Patient Position: Chair, Cuff Size: Adult Regular)   Pulse 64   Temp 97.8  F (36.6  C) (Oral)   Resp 20   Ht 1.554 m (5' 1.2\")   Wt 80.4 kg (177 lb 3.2 oz)   BMI 33.26 kg/m    There is no height or weight on file to calculate BMI.  Physical Exam     GENERAL: healthy, alert and no distress  NECK: no adenopathy, no asymmetry, masses, or scars and thyroid normal to palpation  RESP: lungs clear to auscultation - no rales, rhonchi or wheezes  CV: regular rate and rhythm,  no significant murmur,  no peripheral edema    SKIN: no suspicious lesions or rashes  PSYCH: ALert and oriented times 3; Coherent speech, normal rate and volume, able to articulate, logical thoughts, able to abstract reason, no tangential thoughts, no hallucinations or delusions.  Judgement: acknowldeges her children's concerns as appropriate  Having more difficulty with word finding- this is upsetting to her.  She has a daughter check her bank accounts and check writing     Affect is pleasant    Diabetic foot exam: neuropathy in stocking distribution  Dorsalis pedis pulse R:3  Dorsalis pedis pulse L: 3  Skin temperature R:normal  Skin temperature L:normal  Capillary refill R:normal  Capillary refill L:normal  Hair growth R:mildly decreased  Hair growth L:mildly decreased  Nail growth R/L:normal  Monofilament R foot: no filament sensation of entire foot- sensation begins mid calf  Monofilament L foot: no filament sensation of entire foot- sensation begins mid calf  No diabetic ulcers    Diagnostic Test Results:  Labs reviewed in Epic  Results for orders placed or performed in visit on 01/07/20 (from the past 24 hour(s))   Lipid Panel (BFP)   Result Value Ref Range    Cholesterol 162 0 - 199 mg/dL    Triglycerides 131 0 - 149 mg/dL    " "HDL Cholesterol 53 40 - 150 mg/dL    LDL Cholesterol Direct 83 0 - 130 mg/dL    Cholesterol/HDL Ratio 3 0 - 5   ALT (BFP)   Result Value Ref Range    ALT 3 0 - 32 U/L   Basic Metabolic Panel (BFP)   Result Value Ref Range    Carbon Dioxide 25.9 20 - 32 mmol/L    Creatinine 0.81 0.70 - 1.18 mg/dL    Glucose 133 (A) 60 - 99 mg/dL    Sodium 143.5 135 - 146 mmol/L    Potassium 4.77 3.5 - 5.3 mmol/L    Chloride 105.1 98 - 110 mmol/L    Urea Nitrogen 20 7 - 25 mg/dL    Calcium 9.9 8.6 - 10.3 mg/dL    BUN/Creatinine Ratio 24.7    Hemoglobin A1c (BFP)   Result Value Ref Range    Hemoglobin A1C 6.5 4.0 - 7.0 %           Assessment & Plan   Problem List Items Addressed This Visit     B-complex deficiency - Primary    Relevant Orders    VITAMIN B12 INJ /1000MCG (Completed)    Essential hypertension, benign    Relevant Medications    lisinopril (PRINIVIL/ZESTRIL) 20 MG tablet    Other Relevant Orders    Lipid Panel (BFP) (Completed)    ALT (BFP) (Completed)    VENOUS COLLECTION (Completed)    Basic Metabolic Panel (BFP) (Completed)    SONYA (generalized anxiety disorder)    Relevant Medications    citalopram (CELEXA) 20 MG tablet    Mixed hyperlipidemia    Relevant Medications    simvastatin (ZOCOR) 20 MG tablet    Type 2 diabetes mellitus with diabetic neuropathy (H)    Relevant Medications    citalopram (CELEXA) 20 MG tablet    lisinopril (PRINIVIL/ZESTRIL) 20 MG tablet    simvastatin (ZOCOR) 20 MG tablet    Other Relevant Orders    Lipid Panel (BFP) (Completed)    ALT (BFP) (Completed)    VENOUS COLLECTION (Completed)    Basic Metabolic Panel (BFP) (Completed)    Hemoglobin A1c (BFP) (Completed)             BMI:   Estimated body mass index is 33.26 kg/m  as calculated from the following:    Height as of this encounter: 1.554 m (5' 1.2\").    Weight as of this encounter: 80.4 kg (177 lb 3.2 oz).   Weight management plan: Discussed healthy diet and exercise guidelines        FUTURE APPOINTMENTS:       - Follow-up visit in 6 " months  No follow-ups on file.    Judith Walker MD  [facility]

## 2020-01-31 DIAGNOSIS — E11.40 TYPE 2 DIABETES MELLITUS WITH DIABETIC NEUROPATHY, WITHOUT LONG-TERM CURRENT USE OF INSULIN (H): ICD-10-CM

## 2020-01-31 NOTE — TELEPHONE ENCOUNTER
Trisha Lucia is requesting a refill of:    Pending Prescriptions:                       Disp   Refills    metFORMIN (GLUCOPHAGE) 1000 MG tablet [Ph*180 ta*1            Sig: Take 1 tablet (1,000 mg) by mouth 2 times daily           (with meals)      Lab Results   Component Value Date    A1C 6.5 01/07/2020    A1C 6.8 08/07/2019    A1C 6.5 01/11/2019    A1C 6.4 06/13/2018    A1C 6.4 12/28/2017

## 2020-02-05 ENCOUNTER — ALLIED HEALTH/NURSE VISIT (OUTPATIENT)
Dept: FAMILY MEDICINE | Facility: CLINIC | Age: 85
End: 2020-02-05

## 2020-02-05 DIAGNOSIS — E53.9 B-COMPLEX DEFICIENCY: Primary | ICD-10-CM

## 2020-02-05 PROCEDURE — 96372 THER/PROPH/DIAG INJ SC/IM: CPT | Performed by: FAMILY MEDICINE

## 2020-02-05 RX ADMIN — CYANOCOBALAMIN 1000 MCG: 1000 INJECTION, SOLUTION INTRAMUSCULAR; SUBCUTANEOUS at 13:59

## 2020-02-28 ENCOUNTER — OFFICE VISIT (OUTPATIENT)
Dept: FAMILY MEDICINE | Facility: CLINIC | Age: 85
End: 2020-02-28

## 2020-02-28 VITALS
BODY MASS INDEX: 32.74 KG/M2 | DIASTOLIC BLOOD PRESSURE: 60 MMHG | WEIGHT: 174.4 LBS | SYSTOLIC BLOOD PRESSURE: 130 MMHG | OXYGEN SATURATION: 95 % | HEART RATE: 90 BPM | TEMPERATURE: 98 F

## 2020-02-28 DIAGNOSIS — R30.0 DYSURIA: Primary | ICD-10-CM

## 2020-02-28 LAB
ALBUMIN (URINE): 100 MG/DL
APPEARANCE UR: ABNORMAL
BACTERIA, UR: ABNORMAL
BILIRUB UR QL: ABNORMAL
CASTS/LPF: ABNORMAL
COLOR UR: YELLOW
EP/HPF: ABNORMAL
GLUCOSE URINE: ABNORMAL MG/DL
HGB UR QL: ABNORMAL
KETONES UR QL: ABNORMAL MG/DL
LEUKOCYTE ESTERASE - QUEST: ABNORMAL
MISC.: ABNORMAL
NITRITE UR QL STRIP: ABNORMAL
PH UR STRIP: 6.5 PH (ref 5–7)
RBC, UR MICRO: ABNORMAL (ref ?–2)
SP. GRAVITY: >=1.03
UROBILINOGEN UR QL STRIP: 1 EU/DL (ref 0.2–1)
WBC, UR MICRO: ABNORMAL (ref ?–2)

## 2020-02-28 PROCEDURE — 87088 URINE BACTERIA CULTURE: CPT | Mod: 90 | Performed by: FAMILY MEDICINE

## 2020-02-28 PROCEDURE — 81001 URINALYSIS AUTO W/SCOPE: CPT | Performed by: FAMILY MEDICINE

## 2020-02-28 PROCEDURE — 87086 URINE CULTURE/COLONY COUNT: CPT | Mod: 90 | Performed by: FAMILY MEDICINE

## 2020-02-28 PROCEDURE — 99213 OFFICE O/P EST LOW 20 MIN: CPT | Performed by: FAMILY MEDICINE

## 2020-02-28 PROCEDURE — 87077 CULTURE AEROBIC IDENTIFY: CPT | Mod: 90 | Performed by: FAMILY MEDICINE

## 2020-02-28 PROCEDURE — 87186 SC STD MICRODIL/AGAR DIL: CPT | Mod: 90 | Performed by: FAMILY MEDICINE

## 2020-02-28 RX ORDER — NITROFURANTOIN 25; 75 MG/1; MG/1
100 CAPSULE ORAL 2 TIMES DAILY
Qty: 14 CAPSULE | Refills: 0 | Status: SHIPPED | OUTPATIENT
Start: 2020-02-28 | End: 2020-08-19

## 2020-02-28 NOTE — NURSING NOTE
Trisha is here for possible UTI    Pre-visit Screening:  Immunizations:  up to date  Colonoscopy:  NA d/t age  Mammogram: NA d/t age  Asthma Action Test/Plan:  NA  PHQ9:  NA  GAD7:  NA  Questioned patient about current smoking habits Pt. has never smoked.  Ok to leave detailed message on voice mail for today's visit only Yes, phone # [phone]

## 2020-02-28 NOTE — PROGRESS NOTES
SUBJECTIVE: Trisha Lucia is a 84 year old female who complains of urinary frequency, urgency and dysuria x 7 days, without flank pain, fever, chills, or abnormal vaginal discharge or bleeding.     Patient Active Problem List   Diagnosis     Essential hypertension, benign     Other specified idiopathic peripheral neuropathy     Hypothyroidism     Osteoarthrosis, unspecified whether generalized or localized, involving lower leg     Obesity     Irritable bowel syndrome     Mixed hyperlipidemia     ANNA (obstructive sleep apnea)     Health Care Home     B-complex deficiency     Hallux valgus with bunions     ACP (advance care planning)     Type 2 diabetes mellitus with diabetic neuropathy (H)     Pernicious anemia     Spinal stenosis     SONYA (generalized anxiety disorder)     Past Medical History:   Diagnosis Date     Cellulitis and abscess of foot 5/30/2008     DIABETES MELLITUS TYPE II-UNCOMPL 10/24/2007     Essential hypertension, benign      History of small bowel obstruction 6/27/2011 6/16/2011 She had a CT of the abdomen in the emergency room which was suggestive of a small-bowel obstruction. Her lactic acid level was elevated. Surgery was consulted. The patient was put on IV fluids and pain medications. After surgical evaluation, she underwent surgery and had a laparotomy with lysis of adhesive band and small bowel resection with primary enteroenterostomy and appendectomy. During her hospital course, the patient had a good recovery.      Mucous polyp of cervix 9/28/2004     Open wound of foot excluding toes 4/3/2013     Problem list name updated by automated process. Provider to review     Other abnormal glucose      Other specified idiopathic peripheral neuropathy      Sleep apnea      Unspecified hypothyroidism      Family History   Problem Relation Age of Onset     C.A.D. Mother      Diabetes No family hx of      Hypertension Mother      Breast Cancer No family hx of      Cancer - colorectal No family  hx of      Social History     Socioeconomic History     Marital status:      Spouse name: Duane     Number of children: 4     Years of education: 16     Highest education level: Not on file   Occupational History     Occupation: School earline     Employer: [id number]     Employer: [employer]   Social Needs     Financial resource strain: Not on file     Food insecurity:     Worry: Not on file     Inability: Not on file     Transportation needs:     Medical: Not on file     Non-medical: Not on file   Tobacco Use     Smoking status: Never Smoker     Smokeless tobacco: Never Used   Substance and Sexual Activity     Alcohol use: No     Alcohol/week: 0.0 standard drinks     Comment: 0     Drug use: No     Sexual activity: Yes     Partners: Male   Lifestyle     Physical activity:     Days per week: Not on file     Minutes per session: Not on file     Stress: Not on file   Relationships     Social connections:     Talks on phone: Not on file     Gets together: Not on file     Attends [employer] service: Not on file     Active member of club or organization: Not on file     Attends meetings of clubs or organizations: Not on file     Relationship status: Not on file     Intimate partner violence:     Fear of current or ex partner: Not on file     Emotionally abused: Not on file     Physically abused: Not on file     Forced sexual activity: Not on file   Other Topics Concern      Service Not Asked     Blood Transfusions Not Asked     Caffeine Concern Not Asked     Occupational Exposure Not Asked     Hobby Hazards Not Asked     Sleep Concern Not Asked     Stress Concern Not Asked     Weight Concern Not Asked     Special Diet Not Asked     Back Care Not Asked     Exercise Yes     Bike Helmet Not Asked     Seat Belt Yes     Self-Exams Yes     Parent/sibling w/ CABG, MI or angioplasty before 65F 55M? Not Asked   Social History Narrative     Not on file     Past Surgical History:   Procedure Laterality Date      BUNIONECTOMY  4/9/2013    Procedure: BUNIONECTOMY;  RIGHT GREAT CLAWTOE CORRECTION WITH TENDON TRANSFER, ENDOSCOPIC RECESSION OF GASTRONEMIUS, DEBRIDEMENT OF ULCER ON SOLE OF RIGHT FOOT;  Surgeon: Stephon Rae MD;  Location: [facility]     ENDOSCOPIC RECESSION GASTROCNEMIUS (DONTA)  4/9/2013    Procedure: ENDOSCOPIC RECESSION GASTROCNEMIUS (DONTA);;  Surgeon: Stephon Rae MD;  Location: [facility] COLONOSCOPY THRU STOMA, DIAGNOSTIC  2001     HC DEBRIDMENT MASTOID CAVITY, SIMPLE      L ear     HC KNEE SCOPE, DIAGNOSTIC  1997    Arthroscopy, Knee     HC KNEE SCOPE, DIAGNOSTIC  2001    Arthroscopy, Knee     HC OPEN TX TRIMALLEOLAR ANKLE FX W FIX PST LIP Right 2014     HC REPAIR OF HAMMERTOE,ONE  2015    [facility]     HERNIORRHAPHY INCISIONAL (LOCATION) N/A 1/13/2017    Procedure: HERNIORRHAPHY INCISIONAL (LOCATION);  Surgeon: Joaquin Skaggs MD;  Location: [facility]     IRRIGATION AND DEBRIDEMENT FOOT, COMBINED  4/9/2013    Procedure: COMBINED IRRIGATION AND DEBRIDEMENT FOOT;  Debridement of sole ulcer right foot;  Surgeon: Stephon Rae MD;  Location: [facility]     KNEE SURGERY  2011    right total-Dr. Gorman     Small bowel obstruction  6/2011    Small-bowel obstruction, status post laparotomy with lysis of adhesions, small bowel resection with primary enteroenterostomy and appendectomy     aspirin 81 MG EC tablet, Take 1 tablet by mouth daily.  citalopram (CELEXA) 20 MG tablet, Take 1 tablet (20 mg) by mouth daily  gabapentin (NEURONTIN) 300 MG capsule, Take 300 mg by mouth every evening   levothyroxine (SYNTHROID/LEVOTHROID) 112 MCG tablet, Take 1 tablet (112 mcg) by mouth daily  lisinopril (PRINIVIL/ZESTRIL) 20 MG tablet, Take 1 tablet (20 mg) by mouth daily  metFORMIN (GLUCOPHAGE) 1000 MG tablet, Take 1 tablet (1,000 mg) by mouth 2 times daily (with meals)  Multiple Vitamins-Minerals (SENIOR MULTIVITAMIN PLUS) TABS, Take 1 tablet by mouth daily   OXYCONTIN 10 MG 12 hr tablet, Take  10 mg by mouth At Bedtime   pregabalin (LYRICA) 150 MG capsule, Take 150 mg by mouth 3 times daily.  simvastatin (ZOCOR) 20 MG tablet, TAKE 1 TABLET (20 MG) BY MOUTH AT BEDTIME  VITAMIN D, CHOLECALCIFEROL, PO, Take 1,000 Units by mouth daily  hyoscyamine (ANASPAZ/LEVSIN) 0.125 MG tablet, Take 1 tablet (125 mcg) by mouth 4 times daily (before meals and nightly)  meclizine (ANTIVERT) 25 MG tablet, TAKE ONE TABLET BY MOUTH EVERY SIX HOURS AS NEEDED FOR DIZZINESS    vitamin B-12 (CYANOCOBALAMIN) injection 1,000 mcg         Allergies: Cephalexin; Penicillins; Percocet [oxycodone-acetaminophen]; Shellfish allergy; and Sulfa drugs    Immunization History   Administered Date(s) Administered     Allergy Set 1 01/01/2003     Influenza (H1N1) 12/22/2009     Influenza (IIV3) PF 10/08/2007, 10/30/2008, 09/16/2009, 11/26/2010, 10/11/2011, 11/20/2012     Influenza Vaccine IM > 6 months Valent IIV4 09/26/2013, 09/22/2014, 11/10/2015, 10/03/2016, 09/13/2017, 10/18/2018, 09/11/2019     Pneumo Conj 13-V (2010&after) 02/01/2016     Pneumococcal 23 valent 06/19/2003, 06/19/2011     TD (ADULT, 7+) 01/01/2003     TDAP Vaccine (Boostrix) 11/20/2012     Zoster vaccine recombinant adjuvanted (SHINGRIX) 04/02/2018, 08/20/2018     Zoster vaccine, live 01/07/2009        OBJECTIVE:/60 (BP Location: Right arm, Patient Position: Sitting, Cuff Size: Adult Large)   Pulse 90   Temp 98  F (36.7  C) (Oral)   Wt 79.1 kg (174 lb 6.4 oz)   SpO2 95%   BMI 32.74 kg/m    Appears well, in no apparent distress.  Vital signs are normal. The abdomen is soft without tenderness, guarding, mass, rebound or organomegaly. No CVA tenderness or inguinal adenopathy noted. Urine dipstick shows positive for WBC's and positive for bacturia.  Micro exam:  WBC's per HPF and heavy+ bacteria.     ASSESSMENT: UTI uncomplicated without evidence of pyelonephritis    PLAN: Treatment per orders - also push fluids, may use Pyridium OTC prn. Call or return to clinic  prn if these symptoms worsen or fail to improve as anticipated.

## 2020-03-02 LAB — URINE VOIDED CULTURE: ABNORMAL

## 2020-03-04 ENCOUNTER — ALLIED HEALTH/NURSE VISIT (OUTPATIENT)
Dept: FAMILY MEDICINE | Facility: CLINIC | Age: 85
End: 2020-03-04

## 2020-03-04 DIAGNOSIS — E53.9 B-COMPLEX DEFICIENCY: Primary | ICD-10-CM

## 2020-03-04 PROCEDURE — 96372 THER/PROPH/DIAG INJ SC/IM: CPT | Performed by: FAMILY MEDICINE

## 2020-03-15 ENCOUNTER — HEALTH MAINTENANCE LETTER (OUTPATIENT)
Age: 85
End: 2020-03-15

## 2020-04-01 ENCOUNTER — ALLIED HEALTH/NURSE VISIT (OUTPATIENT)
Dept: FAMILY MEDICINE | Facility: CLINIC | Age: 85
End: 2020-04-01

## 2020-04-01 DIAGNOSIS — E53.9 B-COMPLEX DEFICIENCY: Primary | ICD-10-CM

## 2020-04-01 PROCEDURE — 96372 THER/PROPH/DIAG INJ SC/IM: CPT | Performed by: FAMILY MEDICINE

## 2020-04-29 ENCOUNTER — ALLIED HEALTH/NURSE VISIT (OUTPATIENT)
Dept: FAMILY MEDICINE | Facility: CLINIC | Age: 85
End: 2020-04-29

## 2020-04-29 DIAGNOSIS — E53.9 B-COMPLEX DEFICIENCY: Primary | ICD-10-CM

## 2020-04-29 PROCEDURE — 96372 THER/PROPH/DIAG INJ SC/IM: CPT | Performed by: FAMILY MEDICINE

## 2020-04-29 RX ADMIN — CYANOCOBALAMIN 1000 MCG: 1000 INJECTION, SOLUTION INTRAMUSCULAR; SUBCUTANEOUS at 13:39

## 2020-05-19 LAB — RETINOPATHY: NORMAL

## 2020-05-27 ENCOUNTER — TRANSFERRED RECORDS (OUTPATIENT)
Dept: FAMILY MEDICINE | Facility: CLINIC | Age: 85
End: 2020-05-27

## 2020-06-03 ENCOUNTER — ALLIED HEALTH/NURSE VISIT (OUTPATIENT)
Dept: FAMILY MEDICINE | Facility: CLINIC | Age: 85
End: 2020-06-03

## 2020-06-03 DIAGNOSIS — E53.9 B-COMPLEX DEFICIENCY: Primary | ICD-10-CM

## 2020-06-03 PROCEDURE — 96372 THER/PROPH/DIAG INJ SC/IM: CPT | Performed by: FAMILY MEDICINE

## 2020-06-03 RX ADMIN — CYANOCOBALAMIN 1000 MCG: 1000 INJECTION, SOLUTION INTRAMUSCULAR; SUBCUTANEOUS at 15:09

## 2020-07-08 ENCOUNTER — ALLIED HEALTH/NURSE VISIT (OUTPATIENT)
Dept: FAMILY MEDICINE | Facility: CLINIC | Age: 85
End: 2020-07-08

## 2020-07-08 DIAGNOSIS — E53.9 B-COMPLEX DEFICIENCY: Primary | ICD-10-CM

## 2020-07-08 PROCEDURE — 96372 THER/PROPH/DIAG INJ SC/IM: CPT | Performed by: FAMILY MEDICINE

## 2020-07-08 RX ADMIN — CYANOCOBALAMIN 1000 MCG: 1000 INJECTION, SOLUTION INTRAMUSCULAR; SUBCUTANEOUS at 14:31

## 2020-07-08 NOTE — NURSING NOTE
Chief Complaint   Patient presents with     Imm/Inj     vitamin b12     The following medication was given:     MEDICATION: Vitamin B12  1000 mcg  ROUTE: IM  SITE: Deltoid - Right  DOSE: 1000 mcg  LOT #: [id number]  :  [employer] [place]  EXPIRATION DATE:  8/1/2021  NDC#: 5374-4324-55    Given by: Haleigh Thrasher CMA

## 2020-08-04 ENCOUNTER — ALLIED HEALTH/NURSE VISIT (OUTPATIENT)
Dept: FAMILY MEDICINE | Facility: CLINIC | Age: 85
End: 2020-08-04

## 2020-08-04 DIAGNOSIS — E53.9 B-COMPLEX DEFICIENCY: Primary | ICD-10-CM

## 2020-08-04 PROCEDURE — 96372 THER/PROPH/DIAG INJ SC/IM: CPT | Performed by: FAMILY MEDICINE

## 2020-08-04 RX ADMIN — CYANOCOBALAMIN 1000 MCG: 1000 INJECTION, SOLUTION INTRAMUSCULAR; SUBCUTANEOUS at 14:42

## 2020-08-04 NOTE — NURSING NOTE
The following medication was given:     MEDICATION: Vitamin B12  1000mcg  ROUTE: IM  SITE: Deltoid - Left  DOSE: 1ML  LOT #: [id number]  :  [employer]  EXPIRATION DATE:  08/01/21  NDC#: 6229-2084-90

## 2020-08-19 ENCOUNTER — OFFICE VISIT (OUTPATIENT)
Dept: FAMILY MEDICINE | Facility: CLINIC | Age: 85
End: 2020-08-19

## 2020-08-19 VITALS
TEMPERATURE: 97.2 F | OXYGEN SATURATION: 96 % | BODY MASS INDEX: 31.91 KG/M2 | SYSTOLIC BLOOD PRESSURE: 120 MMHG | WEIGHT: 170 LBS | HEART RATE: 60 BPM | DIASTOLIC BLOOD PRESSURE: 62 MMHG

## 2020-08-19 DIAGNOSIS — G62.9 PERIPHERAL POLYNEUROPATHY: ICD-10-CM

## 2020-08-19 DIAGNOSIS — E03.9 HYPOTHYROIDISM, UNSPECIFIED TYPE: ICD-10-CM

## 2020-08-19 DIAGNOSIS — I10 ESSENTIAL HYPERTENSION, BENIGN: ICD-10-CM

## 2020-08-19 DIAGNOSIS — E78.2 MIXED HYPERLIPIDEMIA: ICD-10-CM

## 2020-08-19 DIAGNOSIS — E11.40 TYPE 2 DIABETES MELLITUS WITH DIABETIC NEUROPATHY, WITHOUT LONG-TERM CURRENT USE OF INSULIN (H): Primary | ICD-10-CM

## 2020-08-19 DIAGNOSIS — H81.10 VERTIGO, BENIGN POSITIONAL, UNSPECIFIED LATERALITY: ICD-10-CM

## 2020-08-19 DIAGNOSIS — F41.1 GAD (GENERALIZED ANXIETY DISORDER): ICD-10-CM

## 2020-08-19 LAB
ALBUMIN SERPL-MCNC: 3.9 G/DL (ref 3.6–5.1)
ALBUMIN/GLOB SERPL: 1.6 {RATIO} (ref 1–2.5)
ALP SERPL-CCNC: 62 U/L (ref 33–130)
ALT 1742-6: 1 U/L (ref 0–32)
AST 1920-8: 9 U/L (ref 0–35)
BILIRUB SERPL-MCNC: 0.6 MG/DL (ref 0.2–1.2)
BUN SERPL-MCNC: 23 MG/DL (ref 7–25)
BUN/CREATININE RATIO: 9.5 (ref 6–22)
CALCIUM SERPL-MCNC: 9.9 MG/DL (ref 8.6–10.3)
CHLORIDE SERPLBLD-SCNC: 105 MMOL/L (ref 98–110)
CHOLEST SERPL-MCNC: 134 MG/DL (ref 0–199)
CHOLEST/HDLC SERPL: 3 {RATIO} (ref 0–5)
CO2 SERPL-SCNC: 30.8 MMOL/L (ref 20–32)
CREAT SERPL-MCNC: 0.78 MG/DL (ref 0.7–1.18)
GLOBULIN, CALCULATED - QUEST: 2.4 (ref 1.9–3.7)
GLUCOSE SERPL-MCNC: 121 MG/DL (ref 60–99)
HBA1C MFR BLD: 6.4 % (ref 4–7)
HDLC SERPL-MCNC: 49 MG/DL (ref 40–150)
LDLC SERPL CALC-MCNC: 69 MG/DL (ref 0–130)
POTASSIUM SERPL-SCNC: 5.08 MMOL/L (ref 3.5–5.3)
PROT SERPL-MCNC: 6.3 G/DL (ref 6.1–8.1)
SODIUM SERPL-SCNC: 141.3 MMOL/L (ref 135–146)
TRIGL SERPL-MCNC: 80 MG/DL (ref 0–149)

## 2020-08-19 PROCEDURE — 99214 OFFICE O/P EST MOD 30 MIN: CPT | Performed by: FAMILY MEDICINE

## 2020-08-19 PROCEDURE — 80053 COMPREHEN METABOLIC PANEL: CPT | Performed by: FAMILY MEDICINE

## 2020-08-19 PROCEDURE — 83036 HEMOGLOBIN GLYCOSYLATED A1C: CPT | Performed by: FAMILY MEDICINE

## 2020-08-19 PROCEDURE — 80061 LIPID PANEL: CPT | Performed by: FAMILY MEDICINE

## 2020-08-19 PROCEDURE — 36415 COLL VENOUS BLD VENIPUNCTURE: CPT | Performed by: FAMILY MEDICINE

## 2020-08-19 RX ORDER — MECLIZINE HYDROCHLORIDE 25 MG/1
TABLET ORAL
Qty: 30 TABLET | Refills: 0 | Status: SHIPPED | OUTPATIENT
Start: 2020-08-19 | End: 2023-02-14

## 2020-08-19 RX ORDER — SIMVASTATIN 20 MG
TABLET ORAL
Qty: 90 TABLET | Refills: 1 | Status: SHIPPED | OUTPATIENT
Start: 2020-08-19 | End: 2021-03-09

## 2020-08-19 RX ORDER — GABAPENTIN 300 MG/1
300 CAPSULE ORAL EVERY EVENING
Qty: 90 CAPSULE | Refills: 1 | Status: SHIPPED | OUTPATIENT
Start: 2020-08-19 | End: 2021-03-09

## 2020-08-19 RX ORDER — LISINOPRIL 20 MG/1
20 TABLET ORAL DAILY
Qty: 90 TABLET | Refills: 1 | Status: SHIPPED | OUTPATIENT
Start: 2020-08-19 | End: 2021-03-09

## 2020-08-19 RX ORDER — PREGABALIN 150 MG/1
150 CAPSULE ORAL 3 TIMES DAILY
Qty: 270 CAPSULE | Refills: 1 | Status: SHIPPED | OUTPATIENT
Start: 2020-08-19 | End: 2021-03-09

## 2020-08-19 NOTE — NURSING NOTE
Trisha Vaughn is here for fasting med check and a1c.        Pre-visit Screening:  Immunizations:  up to date  Colonoscopy:  is up to date  Mammogram: NA  Asthma Action Test/Plan:  NA  PHQ9:  Done today  GAD7:  Done today  Questioned patient about current smoking habits Pt. has never smoked.  Ok to leave detailed message on voice mail for today's visit only Yes, phone # [phone] (Preferred)

## 2020-08-19 NOTE — PROGRESS NOTES
HPI    Diabetes Follow-up-Metformin      How often are you checking your blood sugar? Not at all    What concerns do you have today about your diabetes? None     Do you have any of these symptoms? (Select all that apply)  Numbness in feet and Burning in feet              Hyperlipidemia Follow-Up      Are you regularly taking any medication or supplement to lower your cholesterol?   Yes- simvastatin    Are you having muscle aches or other side effects that you think could be caused by your cholesterol lowering medication?  No    Hypertension Follow-up      Do you check your blood pressure regularly outside of the clinic? Yes     Are you following a low salt diet? No    Are your blood pressures ever more than 140 on the top number (systolic) OR more   than 90 on the bottom number (diastolic), for example 140/90? No    BP Readings from Last 2 Encounters:   08/19/20 120/62   02/28/20 130/60     Hemoglobin A1C (%)   Date Value   01/07/2020 6.5   08/07/2019 6.8     LDL Cholesterol Calculated (mg/dL (calc))   Date Value   01/11/2019 70   12/28/2017 76     LDL Cholesterol Direct (mg/dL)   Date Value   01/07/2020 83       Anxiety Follow-Up    How are you doing with your anxiety since your last visit? No change    Are you having other symptoms that might be associated with anxiety? No    Have you had a significant life event? No     Are you feeling depressed? No    Do you have any concerns with your use of alcohol or other drugs? No    Social History     Tobacco Use     Smoking status: Never Smoker     Smokeless tobacco: Never Used   Substance Use Topics     Alcohol use: No     Alcohol/week: 0.0 standard drinks     Comment: 0     Drug use: No     SONYA-7 SCORE 11/2/2017 1/11/2019 8/7/2019   Total Score 16 3 3     PHQ 12/28/2017 8/7/2019 1/7/2020   PHQ-9 Total Score 9 6 4   Q9: Thoughts of better off dead/self-harm past 2 weeks Not at all Not at all Not at all     seereview    Hypothyroidism Follow-up      Since last  visit, patient describes the following symptoms: Weight stable, no hair loss, no skin changes, no constipation, no loose stools      How many servings of fruits and vegetables do you eat daily?  2-3    On average, how many sweetened beverages do you drink each day (Examples: soda, juice, sweet tea, etc.  Do NOT count diet or artificially sweetened beverages)?   1    How many days per week do you exercise enough to make your heart beat faster? 3 or less    How many minutes a day do you exercise enough to make your heart beat faster? 9 or less    How many days per week do you miss taking your medication? 0       Patient Active Problem List   Diagnosis     Essential hypertension, benign     Other specified idiopathic peripheral neuropathy     Hypothyroidism     Osteoarthrosis, unspecified whether generalized or localized, involving lower leg     Obesity     Irritable bowel syndrome     Mixed hyperlipidemia     ANNA (obstructive sleep apnea)     Health Care Home     B-complex deficiency     Hallux valgus with bunions     ACP (advance care planning)     Type 2 diabetes mellitus with diabetic neuropathy (H)     Pernicious anemia     Spinal stenosis     SONYA (generalized anxiety disorder)     Past Medical History:   Diagnosis Date     Cellulitis and abscess of foot 5/30/2008     DIABETES MELLITUS TYPE II-UNCOMPL 10/24/2007     Essential hypertension, benign      History of small bowel obstruction 6/27/2011 6/16/2011 She had a CT of the abdomen in the emergency room which was suggestive of a small-bowel obstruction. Her lactic acid level was elevated. Surgery was consulted. The patient was put on IV fluids and pain medications. After surgical evaluation, she underwent surgery and had a laparotomy with lysis of adhesive band and small bowel resection with primary enteroenterostomy and appendectomy. During her hospital course, the patient had a good recovery.      Mucous polyp of cervix 9/28/2004     Open wound of foot  excluding toes 4/3/2013     Problem list name updated by automated process. Provider to review     Other abnormal glucose      Other specified idiopathic peripheral neuropathy      Sleep apnea      Unspecified hypothyroidism      Family History   Problem Relation Age of Onset     C.A.D. Mother      Diabetes No family hx of      Hypertension Mother      Breast Cancer No family hx of      Cancer - colorectal No family hx of      Social History     Socioeconomic History     Marital status:      Spouse name: Duane     Number of children: 4     Years of education: 16     Highest education level: Not on file   Occupational History     Occupation: School earline     Employer: [employer]     Employer: [employer]   Social Needs     Financial resource strain: Not on file     Food insecurity     Worry: Not on file     Inability: Not on file     Transportation needs     Medical: Not on file     Non-medical: Not on file   Tobacco Use     Smoking status: Never Smoker     Smokeless tobacco: Never Used   Substance and Sexual Activity     Alcohol use: No     Alcohol/week: 0.0 standard drinks     Comment: 0     Drug use: No     Sexual activity: Yes     Partners: Male   Lifestyle     Physical activity     Days per week: Not on file     Minutes per session: Not on file     Stress: Not on file   Relationships     Social connections     Talks on phone: Not on file     Gets together: Not on file     Attends Jehovah's witness service: Not on file     Active member of club or organization: Not on file     Attends meetings of clubs or organizations: Not on file     Relationship status: Not on file     Intimate partner violence     Fear of current or ex partner: Not on file     Emotionally abused: Not on file     Physically abused: Not on file     Forced sexual activity: Not on file   Other Topics Concern      Service Not Asked     Blood Transfusions Not Asked     Caffeine Concern Not Asked     Occupational Exposure Not Asked      Hobby Hazards Not Asked     Sleep Concern Not Asked     Stress Concern Not Asked     Weight Concern Not Asked     Special Diet Not Asked     Back Care Not Asked     Exercise Yes     Bike Helmet Not Asked     Seat Belt Yes     Self-Exams Yes     Parent/sibling w/ CABG, MI or angioplasty before 65F 55M? Not Asked   Social History Narrative     Not on file     Past Surgical History:   Procedure Laterality Date     BUNIONECTOMY  4/9/2013    Procedure: BUNIONECTOMY;  RIGHT GREAT CLAWTOE CORRECTION WITH TENDON TRANSFER, ENDOSCOPIC RECESSION OF GASTRONEMIUS, DEBRIDEMENT OF ULCER ON SOLE OF RIGHT FOOT;  Surgeon: Stephon Rae MD;  Location: [facility]     ENDOSCOPIC RECESSION GASTROCNEMIUS (DONTA)  4/9/2013    Procedure: ENDOSCOPIC RECESSION GASTROCNEMIUS (DONTA);;  Surgeon: Stephon Rae MD;  Location: [facility]     HC COLONOSCOPY THRU STOMA, DIAGNOSTIC  2001     HC DEBRIDMENT MASTOID CAVITY, SIMPLE      L ear     HC KNEE SCOPE, DIAGNOSTIC  1997    Arthroscopy, Knee     HC KNEE SCOPE, DIAGNOSTIC  2001    Arthroscopy, Knee     HC OPEN TX TRIMALLEOLAR ANKLE FX W FIX PST LIP Right 2014     HC REPAIR OF HAMMERTOE,ONE  2015    [facility]     HERNIORRHAPHY INCISIONAL (LOCATION) N/A 1/13/2017    Procedure: HERNIORRHAPHY INCISIONAL (LOCATION);  Surgeon: Joaquin Skaggs MD;  Location: [facility]     IRRIGATION AND DEBRIDEMENT FOOT, COMBINED  4/9/2013    Procedure: COMBINED IRRIGATION AND DEBRIDEMENT FOOT;  Debridement of sole ulcer right foot;  Surgeon: Stephon Rae MD;  Location: [facility]     KNEE SURGERY  2011    right total-Dr. Gorman     Small bowel obstruction  6/2011    Small-bowel obstruction, status post laparotomy with lysis of adhesions, small bowel resection with primary enteroenterostomy and appendectomy     aspirin 81 MG EC tablet, Take 1 tablet by mouth daily.  citalopram (CELEXA) 20 MG tablet, Take 1 tablet (20 mg) by mouth daily  gabapentin (NEURONTIN) 300 MG capsule, Take 300 mg by mouth  every evening   hyoscyamine (ANASPAZ/LEVSIN) 0.125 MG tablet, Take 1 tablet (125 mcg) by mouth 4 times daily (before meals and nightly)  levothyroxine (SYNTHROID/LEVOTHROID) 112 MCG tablet, Take 1 tablet (112 mcg) by mouth daily  lisinopril (PRINIVIL/ZESTRIL) 20 MG tablet, Take 1 tablet (20 mg) by mouth daily  meclizine (ANTIVERT) 25 MG tablet, TAKE ONE TABLET BY MOUTH EVERY SIX HOURS AS NEEDED FOR DIZZINESS  metFORMIN (GLUCOPHAGE) 1000 MG tablet, Take 1 tablet (1,000 mg) by mouth 2 times daily (with meals)  Multiple Vitamins-Minerals (SENIOR MULTIVITAMIN PLUS) TABS, Take 1 tablet by mouth daily   OXYCONTIN 10 MG 12 hr tablet, Take 10 mg by mouth At Bedtime   pregabalin (LYRICA) 150 MG capsule, Take 150 mg by mouth 3 times daily.  simvastatin (ZOCOR) 20 MG tablet, TAKE 1 TABLET (20 MG) BY MOUTH AT BEDTIME  VITAMIN D, CHOLECALCIFEROL, PO, Take 1,000 Units by mouth daily    vitamin B-12 (CYANOCOBALAMIN) injection 1,000 mcg         Allergies: Cephalexin; Penicillins; Percocet [oxycodone-acetaminophen]; Shellfish allergy; and Sulfa drugs    Immunization History   Administered Date(s) Administered     Allergy Set 1 01/01/2003     Influenza (H1N1) 12/22/2009     Influenza (IIV3) PF 10/08/2007, 10/30/2008, 09/16/2009, 11/26/2010, 10/11/2011, 11/20/2012     Influenza Vaccine IM > 6 months Valent IIV4 09/26/2013, 09/22/2014, 11/10/2015, 10/03/2016, 09/13/2017, 10/18/2018, 09/11/2019     Pneumo Conj 13-V (2010&after) 02/01/2016     Pneumococcal 23 valent 06/19/2003, 06/19/2011     TD (ADULT, 7+) 01/01/2003     TDAP Vaccine (Boostrix) 11/20/2012     Zoster vaccine recombinant adjuvanted (SHINGRIX) 04/02/2018, 08/20/2018     Zoster vaccine, live 01/07/2009          Review of Systems   Constitutional, HEENT, cardiovascular, pulmonary, gi and gu systems are negative, except as otherwise noted.       Objective          Vitals:/62   Pulse 60   Temp 97.2  F (36.2  C) (Temporal)   Wt 77.1 kg (170 lb)   SpO2 96%   BMI 31.91  kg/m     OBJECTIVE:   Exam:  GENERAL APPEARANCE: healthy, alert and no distress  EYES: EOMI,  PERRL  HENT: ear canals and TM's normal and nose and mouth without ulcers or lesions  NECK: no adenopathy, no asymmetry, masses, or scars and thyroid normal to palpation  RESP: lungs clear to auscultation - no rales, rhonchi or wheezes  CV: regular rates and rhythm, normal S1 S2, no S3 or S4 and no murmur, click or rub -  ABDOMEN:  soft, nontender, no HSM or masses and bowel sounds normal  MS: extremities normal- no gross deformities noted, no evidence of inflammation in joints, FROM in all extremities.  SKIN: no suspicious lesions or rashes  NEURO: Normal strength and tone, sensory exam grossly normal, mentation intact and speech normal  PSYCH: mentation appears normal and affect normal/bright      Type 2 diabetes mellitus with diabetic neuropathy, without long-term current use of insulin (H)  (primary encounter diagnosis)  Comment: well controlled  Plan: Hemoglobin A1c (BFP), lisinopril (ZESTRIL) 20         MG tablet, metFORMIN (GLUCOPHAGE) 1000 MG         tablet, simvastatin (ZOCOR) 20 MG tablet        continue current medications at current doses     (E78.2) Mixed hyperlipidemia  Comment: control uncertain  Plan: simvastatin (ZOCOR) 20 MG tablet        continue current medications at current doses pending labs    (I10) Essential hypertension, benign  Comment: well controlled  Plan: lisinopril (ZESTRIL) 20 MG tablet        continue current medications at current doses     (F41.1) SONYA (generalized anxiety disorder)  Comment: stable symptomatically   Plan: continue current medications at current doses     (E03.9) Hypothyroidism, unspecified type  Comment: stable symptomatically   Plan: TSH with free T4 reflex ([facility])        continue current medications at current doses pending labs    (H81.10) Vertigo, benign positional, unspecified laterality  Comment: stable symptomatically   Plan: meclizine (ANTIVERT) 25 MG tablet         continue current medications at current doses     (G62.9) Peripheral polyneuropathy  Comment: stable symptomatically   Plan: gabapentin (NEURONTIN) 300 MG capsule,         pregabalin (LYRICA) 150 MG capsule        continue current medications at current doses

## 2020-08-20 LAB
T4, FREE, NON-DIALYSIS - QUEST: 1.6 NG/DL (ref 0.8–1.8)
TSH SERPL-ACNC: 0.35 MIU/L (ref 0.4–4.5)

## 2020-08-26 ENCOUNTER — TRANSFERRED RECORDS (OUTPATIENT)
Dept: FAMILY MEDICINE | Facility: CLINIC | Age: 85
End: 2020-08-26

## 2020-08-28 DIAGNOSIS — E03.9 HYPOTHYROIDISM, UNSPECIFIED TYPE: ICD-10-CM

## 2020-08-28 NOTE — TELEPHONE ENCOUNTER
Patient last saw you on 8/19/2020.  Needs her levothyroxine    Pending Prescriptions:                       Disp   Refills    levothyroxine (SYNTHROID/LEVOTHROID) 112 *90 tab*3            Sig: Take 1 tablet (112 mcg) by mouth daily

## 2020-08-31 RX ORDER — LEVOTHYROXINE SODIUM 112 UG/1
TABLET ORAL
Qty: 90 TABLET | Refills: 3 | Status: SHIPPED | OUTPATIENT
Start: 2020-08-31 | End: 2021-08-30

## 2020-09-02 ENCOUNTER — ALLIED HEALTH/NURSE VISIT (OUTPATIENT)
Dept: FAMILY MEDICINE | Facility: CLINIC | Age: 85
End: 2020-09-02

## 2020-09-02 DIAGNOSIS — E53.9 B-COMPLEX DEFICIENCY: Primary | ICD-10-CM

## 2020-09-02 PROCEDURE — 96372 THER/PROPH/DIAG INJ SC/IM: CPT | Performed by: FAMILY MEDICINE

## 2020-09-02 RX ADMIN — CYANOCOBALAMIN 1000 MCG: 1000 INJECTION, SOLUTION INTRAMUSCULAR; SUBCUTANEOUS at 14:33

## 2020-09-15 ENCOUNTER — OFFICE VISIT (OUTPATIENT)
Dept: FAMILY MEDICINE | Facility: CLINIC | Age: 85
End: 2020-09-15

## 2020-09-15 VITALS
HEART RATE: 68 BPM | SYSTOLIC BLOOD PRESSURE: 116 MMHG | HEIGHT: 61 IN | DIASTOLIC BLOOD PRESSURE: 58 MMHG | TEMPERATURE: 97.8 F | OXYGEN SATURATION: 98 % | WEIGHT: 170 LBS | BODY MASS INDEX: 32.1 KG/M2

## 2020-09-15 DIAGNOSIS — N30.01 ACUTE CYSTITIS WITH HEMATURIA: Primary | ICD-10-CM

## 2020-09-15 LAB
ALBUMIN (URINE): ABNORMAL MG/DL
APPEARANCE UR: ABNORMAL
BACTERIA, UR: ABNORMAL
BILIRUB UR QL: ABNORMAL
CASTS/LPF: ABNORMAL
COLOR UR: YELLOW
EP/HPF: ABNORMAL
GLUCOSE URINE: ABNORMAL MG/DL
HGB UR QL: ABNORMAL
KETONES UR QL: ABNORMAL MG/DL
LEUKOCYTE ESTERASE - QUEST: ABNORMAL
MISC.: ABNORMAL
NITRITE UR QL STRIP: ABNORMAL
PH UR STRIP: 7 PH (ref 5–7)
RBC, UR MICRO: ABNORMAL (ref ?–2)
SP. GRAVITY: 1.02
UROBILINOGEN UR QL STRIP: 0.2 EU/DL (ref 0.2–1)
WBC, UR MICRO: ABNORMAL (ref ?–2)

## 2020-09-15 PROCEDURE — 99213 OFFICE O/P EST LOW 20 MIN: CPT | Performed by: PHYSICIAN ASSISTANT

## 2020-09-15 PROCEDURE — 81001 URINALYSIS AUTO W/SCOPE: CPT | Performed by: PHYSICIAN ASSISTANT

## 2020-09-15 RX ORDER — HYDROCODONE BITARTRATE AND ACETAMINOPHEN 5; 325 MG/1; MG/1
TABLET ORAL
Status: ON HOLD | COMMUNITY
Start: 2020-09-09 | End: 2023-01-10

## 2020-09-15 RX ORDER — NITROFURANTOIN 25; 75 MG/1; MG/1
100 CAPSULE ORAL 2 TIMES DAILY
Qty: 14 CAPSULE | Refills: 0 | Status: SHIPPED | OUTPATIENT
Start: 2020-09-15 | End: 2020-09-22

## 2020-09-15 ASSESSMENT — MIFFLIN-ST. JEOR: SCORE: 1153.49

## 2020-09-15 NOTE — NURSING NOTE
Trisha is here for frequency to urinate, keeps her up at night, no pain though.          Pre-visit Screening:  Immunizations:  up to date  Colonoscopy:  is up to date  Mammogram: is due  Asthma Action Test/Plan:  NA  PHQ9:  NA  GAD7:  Na  Questioned patient about current smoking habits Pt. has never smoked.  Ok to leave detailed message on voice mail for today's visit only Yes, phone # [phone] (Preferred)

## 2020-09-15 NOTE — PROGRESS NOTES
Chief Complaint   Patient presents with     UTI     frequency to urinate, keeps her up at night, no pain though.     SUBJECTIVE  Trisha Lucia in for a possible UTI.  Symptoms started 5 day(s) ago. Symptoms include  urgency, frequency and voiding in small amounts  She denies dysuria, suprapubic pain and pressure, back pain, nausea, vomiting, fever and chills. She does have a history of uti's.  She denies any unusual vaginal discharge or odor.     She is not sexually active.  LMP Menopausal.     Recent abx use? No  Flank pain?  No    OTC treatment? None    Current Outpatient Medications   Medication     aspirin 81 MG EC tablet     citalopram (CELEXA) 20 MG tablet     gabapentin (NEURONTIN) 300 MG capsule     hyoscyamine (ANASPAZ/LEVSIN) 0.125 MG tablet     levothyroxine (SYNTHROID/LEVOTHROID) 112 MCG tablet     lisinopril (ZESTRIL) 20 MG tablet     meclizine (ANTIVERT) 25 MG tablet     metFORMIN (GLUCOPHAGE) 1000 MG tablet     Multiple Vitamins-Minerals (SENIOR MULTIVITAMIN PLUS) TABS     nitroFURantoin macrocrystal-monohydrate (MACROBID) 100 MG capsule     OXYCONTIN 10 MG 12 hr tablet     pregabalin (LYRICA) 150 MG capsule     simvastatin (ZOCOR) 20 MG tablet     VITAMIN D, CHOLECALCIFEROL, PO     HYDROcodone-acetaminophen (NORCO) 5-325 MG tablet     Current Facility-Administered Medications   Medication     vitamin B-12 (CYANOCOBALAMIN) injection 1,000 mcg       Patient Active Problem List    Diagnosis Date Noted     SONYA (generalized anxiety disorder) 11/02/2017     Priority: Medium     Spinal stenosis 08/10/2017     Priority: Medium     Pernicious anemia 05/09/2016     Priority: Medium     Type 2 diabetes mellitus with diabetic neuropathy (H) 02/01/2016     Priority: Medium     ACP (advance care planning) 07/15/2014     Priority: Medium     Advance Care Planning 7/6/2016: Receipt of ACP document:  Received: Health Care Directive which was witnessed or notarized on 1/18/11.  Document previously scanned on  4/12/16.  Validation form completed and sent to be scanned.  Code Status needs to be updated to reflect choices in most recent ACP document. A conversation about goals of care is recommended for this patient with creation of POLST if appropriate. Confirmed/documented designated decision maker(s).  Added by Josie Holman   Advance Care Planning Liaison  Advance Care Planning 7/15/2014 : ACP Review and Resources Provided:  Reviewed chart for advance care plan.  Trisha Johana ANATOLIY Rea has no plan or code status on file. Discussed available resources and provided with information. Confirmed code status reflects current choices pending further ACP discussions.  Confirmed/documented designated decision maker(s). See permanent comments section of demographics in clinical tab. Added by Destiny thompson 04/03/2013     Priority: Medium     B-complex deficiency 02/15/2013     Priority: Medium     Problem list name updated by automated process. Provider to review       Health Care Home 09/13/2012     Priority: Medium     State Tier Level:  Tier 2  Status:  n/a  Care Coordinator:    See Letters for [facility]           ANNA (obstructive sleep apnea) 12/27/2010     Priority: Medium     cpap       Mixed hyperlipidemia 02/01/2006     Priority: Medium     Obesity 09/28/2004     Priority: Medium     Problem list name updated by automated process. Provider to review       Irritable bowel syndrome 09/28/2004     Priority: Medium     Essential hypertension, benign 06/19/2003     Priority: Medium     Other specified idiopathic peripheral neuropathy 06/19/2003     Priority: Medium     Hypothyroidism 06/19/2003     Priority: Medium     Problem list name updated by automated process. Provider to review       Osteoarthrosis, unspecified whether generalized or localized, involving lower leg 06/19/2003     Priority: Medium     Problem list name updated by automated process. Provider to review    "      OBJECTIVE:    /58   Pulse 68   Temp 97.8  F (36.6  C) (Oral)   Ht 1.549 m (5' 1\")   Wt 77.1 kg (170 lb)   SpO2 98%   BMI 32.12 kg/m      Patient is a 85 year old female, in no acute distress. Vitals noted.  Cardiac:  Normal rhythm and rate, no murmurs, rubs or gallops.  Lungs: clear to auscultation bilaterally; no wheezes, crackles or rhonchi  Abdomen:  Bowel sounds normal. Soft,  not distended, no masses, no hepatosplenomegaly, non-tender.  There is not CVA tenderness.    Urinalysis:  WBCs, bacteria, RBCs    Culture pending?  Yes      ASSESSMENT:  1. Acute cystitis with hematuria        PLAN:  Consistent with acute cystitis.  Rx:  Nitrofurantoin, BID, 7 days  I am concerned Trisha is not getting enough fluids. Likely only having 30 oz daily. Encouraged her to work towards 64 oz daily. Would recommended referral to urology if cystitis recurs.     Push fluids, frequent voiding, acetominophen/ibuprofen prn as tolerated.  Call or return to clinic prn if these symptoms worsen or fail to improve as anticipated.  Repeat UA indicated: No    Trisha Lucia understands and agrees with the plan.    Jane Means PA-C      "

## 2020-09-18 LAB — URINE VOIDED CULTURE: ABNORMAL

## 2020-09-24 ENCOUNTER — TELEPHONE (OUTPATIENT)
Dept: FAMILY MEDICINE | Facility: CLINIC | Age: 85
End: 2020-09-24

## 2020-09-24 NOTE — TELEPHONE ENCOUNTER
Pt saw [facility] on 9/15/2020. Pt called to say she is out of nitrofurantoin but feels her infection is not gone. Please Advise. Thanks

## 2020-09-24 NOTE — TELEPHONE ENCOUNTER
Based on urine culture, infection should have been cleared. Pt should return to clinic to recheck urine and confirm negative, and discuss other possibilities. Please inform.

## 2020-09-25 ENCOUNTER — OFFICE VISIT (OUTPATIENT)
Dept: FAMILY MEDICINE | Facility: CLINIC | Age: 85
End: 2020-09-25

## 2020-09-25 VITALS
DIASTOLIC BLOOD PRESSURE: 62 MMHG | TEMPERATURE: 97.4 F | OXYGEN SATURATION: 96 % | HEIGHT: 61 IN | SYSTOLIC BLOOD PRESSURE: 116 MMHG | BODY MASS INDEX: 32.1 KG/M2 | WEIGHT: 170 LBS | HEART RATE: 73 BPM

## 2020-09-25 DIAGNOSIS — N30.01 ACUTE CYSTITIS WITH HEMATURIA: Primary | ICD-10-CM

## 2020-09-25 LAB
ALBUMIN (URINE): ABNORMAL MG/DL
APPEARANCE UR: ABNORMAL
BACTERIA, UR: ABNORMAL
BILIRUB UR QL: ABNORMAL
CASTS/LPF: ABNORMAL
COLOR UR: ABNORMAL
EP/HPF: ABNORMAL
GLUCOSE URINE: ABNORMAL MG/DL
HGB UR QL: ABNORMAL
KETONES UR QL: ABNORMAL MG/DL
LEUKOCYTE ESTERASE - QUEST: ABNORMAL
MISC.: ABNORMAL
NITRITE UR QL STRIP: ABNORMAL
PH UR STRIP: 5 PH (ref 5–7)
RBC, UR MICRO: ABNORMAL (ref ?–2)
SP. GRAVITY: 1.02
UROBILINOGEN UR QL STRIP: 0.2 EU/DL (ref 0.2–1)
WBC, UR MICRO: ABNORMAL (ref ?–2)

## 2020-09-25 PROCEDURE — 99213 OFFICE O/P EST LOW 20 MIN: CPT | Performed by: PHYSICIAN ASSISTANT

## 2020-09-25 PROCEDURE — 81001 URINALYSIS AUTO W/SCOPE: CPT | Performed by: PHYSICIAN ASSISTANT

## 2020-09-25 ASSESSMENT — MIFFLIN-ST. JEOR: SCORE: 1153.49

## 2020-09-25 NOTE — PROGRESS NOTES
"CC: Recheck urine    History:  Trisha returns today to recheck her urine infection. Was last here 9/15/2020 and had UA and UC confirmed acute cystitis that was susceptible to nitrofurantoin, which she took for 7 days BID. She tolerated this well, and symptoms have only mildly improve, but still have urgency, where when she needs to go she needs to go right away.    PMH, MEDICATIONS, ALLERGIES, SOCIAL AND FAMILY HISTORY in [facility] and reviewed by me personally.    ROS negative other than the symptoms noted above in the HPI.      Examination   /62   Pulse 73   Temp 97.4  F (36.3  C) (Oral)   Ht 1.549 m (5' 1\")   Wt 77.1 kg (170 lb)   SpO2 96%   BMI 32.12 kg/m       Constitutional: Sitting comfortably, in no acute distress. Vital signs noted  Neck:  no adenopathy, trachea midline and normal to palpation, thyroid normal to palpation  Cardiovascular:  regular rate and rhythm, no murmurs, clicks, or gallops  Respiratory:  normal respiratory rate and rhythm, lungs clear to auscultation  SKIN: No jaundice/pallor/rash.   Psychiatric: mentation appears normal and affect normal/bright        A/P    ICD-10-CM    1. Acute cystitis with hematuria  N30.01 HCL  Urinalysis, Routine (BFP)     Urine Culture Aerobic Bacterial       DISCUSSION:  Trisha's UA today shows significant improvement. She is relatively comfortable, so recommended we monitor through the weekend as opposed to restarting her on nitrofurantoin and having to make additional adjustment next week. I will call her on Monday to discuss. Unfortunately, oral options for this enterococcus bacteria are limited with her PCN allergy, but could consider vancomycin, or possibly medication like oxybutynin for bladder spasms.    follow up visit: As needed    Jane Means PA-C  [facility]    "

## 2020-09-25 NOTE — NURSING NOTE
Trisha is here for patient erick has frequent urination and feels lethargic more than usual.        Pre-visit Screening:  Immunizations:  up to date  Colonoscopy:  NA  Mammogram: NA  Asthma Action Test/Plan:  NA  PHQ9:  None  GAD7:  None  Questioned patient about current smoking habits Pt. has never smoked.  Ok to leave detailed message on voice mail for today's visit only Yes, phone # [phone] (Preferred)

## 2020-09-27 LAB — URINE VOIDED CULTURE: NORMAL

## 2020-09-28 ENCOUNTER — TELEPHONE (OUTPATIENT)
Dept: FAMILY MEDICINE | Facility: CLINIC | Age: 85
End: 2020-09-28

## 2020-09-28 NOTE — TELEPHONE ENCOUNTER
Called and spoke to Trisha. Informed that urine culture was negative/inconclusive. Fortunately, symptoms are mildly improved. She will come back in on Wednesday to try one more try to do clean catch UA/UC if symptoms continue.

## 2020-09-30 ENCOUNTER — OFFICE VISIT (OUTPATIENT)
Dept: FAMILY MEDICINE | Facility: CLINIC | Age: 85
End: 2020-09-30

## 2020-09-30 VITALS
SYSTOLIC BLOOD PRESSURE: 118 MMHG | WEIGHT: 166 LBS | BODY MASS INDEX: 31.34 KG/M2 | OXYGEN SATURATION: 97 % | HEART RATE: 67 BPM | DIASTOLIC BLOOD PRESSURE: 60 MMHG | HEIGHT: 61 IN | TEMPERATURE: 97.2 F

## 2020-09-30 DIAGNOSIS — R39.15 URINARY URGENCY: Primary | ICD-10-CM

## 2020-09-30 DIAGNOSIS — E53.9 B-COMPLEX DEFICIENCY: ICD-10-CM

## 2020-09-30 DIAGNOSIS — N39.41 URGE INCONTINENCE OF URINE: ICD-10-CM

## 2020-09-30 LAB
ALBUMIN (URINE): 6 MG/DL
APPEARANCE UR: CLEAR
BACTERIA, UR: ABNORMAL
BILIRUB UR QL: ABNORMAL
CASTS/LPF: ABNORMAL
COLOR UR: YELLOW
EP/HPF: ABNORMAL
GLUCOSE URINE: ABNORMAL MG/DL
HGB UR QL: ABNORMAL
KETONES UR QL: ABNORMAL MG/DL
LEUKOCYTE ESTERASE - QUEST: ABNORMAL
MISC.: ABNORMAL
NITRITE UR QL STRIP: ABNORMAL
PH UR STRIP: 6 PH (ref 5–7)
RBC, UR MICRO: ABNORMAL (ref ?–2)
SP. GRAVITY: 1.02
UROBILINOGEN UR QL STRIP: 0.2 EU/DL (ref 0.2–1)
WBC, UR MICRO: ABNORMAL (ref ?–2)

## 2020-09-30 PROCEDURE — 81001 URINALYSIS AUTO W/SCOPE: CPT | Performed by: PHYSICIAN ASSISTANT

## 2020-09-30 PROCEDURE — 96372 THER/PROPH/DIAG INJ SC/IM: CPT | Performed by: PHYSICIAN ASSISTANT

## 2020-09-30 PROCEDURE — 99212 OFFICE O/P EST SF 10 MIN: CPT | Performed by: PHYSICIAN ASSISTANT

## 2020-09-30 RX ORDER — CYANOCOBALAMIN 1000 UG/ML
1000 INJECTION, SOLUTION INTRAMUSCULAR; SUBCUTANEOUS
Status: DISCONTINUED | OUTPATIENT
Start: 2020-09-30 | End: 2021-10-26 | Stop reason: CLARIF

## 2020-09-30 RX ADMIN — CYANOCOBALAMIN 1000 MCG: 1000 INJECTION, SOLUTION INTRAMUSCULAR; SUBCUTANEOUS at 15:10

## 2020-09-30 ASSESSMENT — MIFFLIN-ST. JEOR: SCORE: 1135.35

## 2020-09-30 NOTE — NURSING NOTE
Trisha is here for a recheck of UTI and B12 injection.        Pre-visit Screening:  Immunizations:  up to date  Colonoscopy:  Up to date  Mammogram: up to date  Asthma Action Test/Plan:  NA  PHQ9:  NA  GAD7:  NA  Questioned patient about current smoking habits Pt. has never smoked.  Ok to leave detailed message on voice mail for today's visit only Yes, phone # [phone] (Preferred)

## 2020-09-30 NOTE — PROGRESS NOTES
"CC: Recheck urine symptoms, B12 injections    History:  Trisha returns again today with ongoing symptoms of urgency. This has been ongoing for close to 1 year, but seemed to get worse recently, where she thought she had a urinary infection. 9/15/2020 had confirmed cystitis, which was treated with nitrofurantoin. Symptoms have improved, but she continues to have urgency. Admits that she does have some chronic urgency, but this seems worse.     PMH, MEDICATIONS, ALLERGIES, SOCIAL AND FAMILY HISTORY in [facility] and reviewed by me personally.      ROS negative other than the symptoms noted above in the HPI.        Examination   /60   Pulse 67   Temp 97.2  F (36.2  C) (Oral)   Ht 1.549 m (5' 1\")   Wt 75.3 kg (166 lb)   SpO2 97%   BMI 31.37 kg/m         Constitutional: Sitting comfortably, in no acute distress. Vital signs noted  Eyes: pupils equal round reactive to light and accomodation, extra ocular movements intact  Ears: external canals and TMs free of abnormalities  Nose: patent, without mucosal abnormalities  Mouth and throat: without erythema or lesions of the mucosa  SKIN: No jaundice/pallor/rash.   Psychiatric: mentation appears normal and affect normal/bright        A/P    ICD-10-CM    1. Urinary urgency  R39.15 HCL  Urinalysis, Routine (BFP)     Urine Culture Aerobic Bacterial     Urine Culture Aerobic Bacterial     HCL  Urinalysis, Routine (BFP)     CANCELED: HCL  Urinalysis, Routine (BFP)   2. B-complex deficiency  E53.9 cyanocobalamin injection 1,000 mcg   3. Urge incontinence of urine  N39.41        DISCUSSION:  Unable to leave sample today. Agreed to try one more time to have her bring back clean catch sample from home. If normal, I would advise trial of oxybutynin, PT for pelvic floor strengthening, or urology referral.     Will recheck UA and UC one more time as previous sample was contaminated. If normal will recommended trial of oxybutynin, but could also consider PT for pelvic floor training " or urology referral.     follow up visit: As needed    Jane Means PA-C  [facility]

## 2020-10-03 LAB — URINE VOIDED CULTURE: NORMAL

## 2020-11-10 ENCOUNTER — ALLIED HEALTH/NURSE VISIT (OUTPATIENT)
Dept: FAMILY MEDICINE | Facility: CLINIC | Age: 85
End: 2020-11-10

## 2020-11-10 DIAGNOSIS — E53.9 B-COMPLEX DEFICIENCY: Primary | ICD-10-CM

## 2020-11-10 PROCEDURE — 96372 THER/PROPH/DIAG INJ SC/IM: CPT | Performed by: FAMILY MEDICINE

## 2020-11-10 RX ADMIN — CYANOCOBALAMIN 1000 MCG: 1000 INJECTION, SOLUTION INTRAMUSCULAR; SUBCUTANEOUS at 12:39

## 2020-12-09 ENCOUNTER — ALLIED HEALTH/NURSE VISIT (OUTPATIENT)
Dept: FAMILY MEDICINE | Facility: CLINIC | Age: 85
End: 2020-12-09

## 2020-12-09 DIAGNOSIS — Z23 NEED FOR VACCINATION: Primary | ICD-10-CM

## 2020-12-09 PROCEDURE — 96372 THER/PROPH/DIAG INJ SC/IM: CPT | Performed by: FAMILY MEDICINE

## 2020-12-09 RX ADMIN — CYANOCOBALAMIN 1000 MCG: 1000 INJECTION, SOLUTION INTRAMUSCULAR; SUBCUTANEOUS at 14:10

## 2020-12-09 NOTE — NURSING NOTE
The following medication was given:     MEDICATION: Vitamin B12  1,000mcg  ROUTE: IM  SITE: Deltoid - Left  DOSE: 1mL  LOT #: [id number]  :  [employer]  EXPIRATION DATE:  08/01/2021  NDC#: 1054-9024-68

## 2020-12-21 ENCOUNTER — OFFICE VISIT (OUTPATIENT)
Dept: FAMILY MEDICINE | Facility: CLINIC | Age: 85
End: 2020-12-21

## 2020-12-21 VITALS
SYSTOLIC BLOOD PRESSURE: 124 MMHG | OXYGEN SATURATION: 96 % | DIASTOLIC BLOOD PRESSURE: 78 MMHG | WEIGHT: 164 LBS | TEMPERATURE: 97.6 F | BODY MASS INDEX: 30.96 KG/M2 | HEART RATE: 67 BPM | HEIGHT: 61 IN

## 2020-12-21 DIAGNOSIS — R39.15 URINARY URGENCY: Primary | ICD-10-CM

## 2020-12-21 LAB
ALBUMIN (URINE): ABNORMAL MG/DL
APPEARANCE UR: CLEAR
BACTERIA, UR: ABNORMAL
BILIRUB UR QL: ABNORMAL
BUN SERPL-MCNC: 14 MG/DL (ref 7–25)
BUN/CREATININE RATIO: 17.5 (ref 6–22)
CALCIUM SERPL-MCNC: 9.8 MG/DL (ref 8.6–10.3)
CASTS/LPF: ABNORMAL
CHLORIDE SERPLBLD-SCNC: 103 MMOL/L (ref 98–110)
CO2 SERPL-SCNC: 29.5 MMOL/L (ref 20–32)
COLOR UR: YELLOW
CREAT SERPL-MCNC: 0.8 MG/DL (ref 0.7–1.18)
EP/HPF: ABNORMAL
GLUCOSE SERPL-MCNC: 145 MG/DL (ref 60–99)
GLUCOSE URINE: 500 MG/DL
HBA1C MFR BLD: 6.2 % (ref 4–7)
HGB UR QL: ABNORMAL
KETONES UR QL: ABNORMAL MG/DL
LEUKOCYTE ESTERASE - QUEST: ABNORMAL
MISC.: ABNORMAL
NITRITE UR QL STRIP: ABNORMAL
PH UR STRIP: 7 PH (ref 5–7)
POTASSIUM SERPL-SCNC: 4.26 MMOL/L (ref 3.5–5.3)
RBC, UR MICRO: ABNORMAL (ref ?–2)
SODIUM SERPL-SCNC: 139.2 MMOL/L (ref 135–146)
SP. GRAVITY: 1.02
UROBILINOGEN UR QL STRIP: 0.2 EU/DL (ref 0.2–1)
WBC, UR MICRO: ABNORMAL (ref ?–2)

## 2020-12-21 PROCEDURE — 83036 HEMOGLOBIN GLYCOSYLATED A1C: CPT | Performed by: PHYSICIAN ASSISTANT

## 2020-12-21 PROCEDURE — 36415 COLL VENOUS BLD VENIPUNCTURE: CPT | Performed by: PHYSICIAN ASSISTANT

## 2020-12-21 PROCEDURE — 80048 BASIC METABOLIC PNL TOTAL CA: CPT | Performed by: PHYSICIAN ASSISTANT

## 2020-12-21 PROCEDURE — 99213 OFFICE O/P EST LOW 20 MIN: CPT | Performed by: PHYSICIAN ASSISTANT

## 2020-12-21 PROCEDURE — 81001 URINALYSIS AUTO W/SCOPE: CPT | Performed by: PHYSICIAN ASSISTANT

## 2020-12-21 ASSESSMENT — MIFFLIN-ST. JEOR: SCORE: 1126.28

## 2020-12-21 NOTE — NURSING NOTE
Trisha Vaughn is here for a UTI-- urgency to urinate which began 5 days ago, much worse overnight last night and today for frequest urination and urgency.        Pre-visit Screening:  Immunizations:  up to date  Colonoscopy:  is up to date  Mammogram: is due and to be scheduled by patient for later completion  Asthma Action Test/Plan:  NA  PHQ9:  NA  GAD7:  NA  Questioned patient about current smoking habits Pt. has never smoked.  Ok to leave detailed message on voice mail for today's visit only Yes, phone # [phone]

## 2020-12-21 NOTE — PROGRESS NOTES
"CC: Repeat UTI    History:  Trisha is here today with urinary urgency for the past 1 day per patient, but her daughter thought she noticed some urgency late last week. This traditionally indicates a UTI. No fever, sweats, chills. No back pain. Trisha had a confirmed UTI 2/2020 and 9/2020.     PMH, MEDICATIONS, ALLERGIES, SOCIAL AND FAMILY HISTORY in [facility] and reviewed by me personally.    ROS negative other than the symptoms noted above in the HPI.      Examination   /78   Pulse 67   Temp 97.6  F (36.4  C) (Oral)   Ht 1.549 m (5' 1\")   Wt 74.4 kg (164 lb)   SpO2 96%   BMI 30.99 kg/m       Constitutional: Sitting comfortably, in no acute distress. Vital signs noted  Neck:  no adenopathy, trachea midline and normal to palpation, thyroid normal to palpation  Cardiovascular:  regular rate and rhythm, no murmurs, clicks, or gallops  Respiratory:  normal respiratory rate and rhythm, lungs clear to auscultation  SKIN: No jaundice/pallor/rash.   Psychiatric: mentation appears normal and affect normal/bright        A/P    ICD-10-CM    1. Urinary urgency  R39.15 HCL  Urinalysis, Routine (BFP)     VENOUS COLLECTION     Hemoglobin A1c (BFP)     Basic Metabolic Panel (BFP)     HCL  Urinalysis, Routine (BFP)       DISCUSSION:  UA today is normal other than glucose in urine. However, BMP and A1c are stable. This result is unexpected as these symptoms typically followed by a confirmed UA/UC. Recommended she take the next 1-2 days to try to minimize bladder irritants, citrus, sugar. Gave supplies to bring back an additional UA if symptoms do not improve to recheck for UTI. At that point, will refer to urology. Would consider medication like oxybutynin as trial while waiting for urology appt.     follow up visit: As needed    Jane Means PA-C  [facility]    "

## 2021-01-06 ENCOUNTER — ALLIED HEALTH/NURSE VISIT (OUTPATIENT)
Dept: FAMILY MEDICINE | Facility: CLINIC | Age: 86
End: 2021-01-06

## 2021-01-06 DIAGNOSIS — E53.9 B-COMPLEX DEFICIENCY: Primary | ICD-10-CM

## 2021-01-06 PROCEDURE — 96372 THER/PROPH/DIAG INJ SC/IM: CPT | Performed by: PHYSICIAN ASSISTANT

## 2021-01-06 RX ADMIN — CYANOCOBALAMIN 1000 MCG: 1000 INJECTION, SOLUTION INTRAMUSCULAR; SUBCUTANEOUS at 15:32

## 2021-01-06 NOTE — NURSING NOTE
The following medication was given:     MEDICATION: Vitamin B12  1000mcg  ROUTE: IM  SITE: Deltoid - Right  DOSE: 1ML  LOT #: [id number]  :  American [place]  EXPIRATION DATE:  08/01/21  NDC#: 9494-2133-87

## 2021-01-09 ENCOUNTER — HEALTH MAINTENANCE LETTER (OUTPATIENT)
Age: 86
End: 2021-01-09

## 2021-02-09 ENCOUNTER — TELEPHONE (OUTPATIENT)
Dept: FAMILY MEDICINE | Facility: CLINIC | Age: 86
End: 2021-02-09

## 2021-02-09 DIAGNOSIS — F41.1 GAD (GENERALIZED ANXIETY DISORDER): ICD-10-CM

## 2021-02-09 RX ORDER — CITALOPRAM HYDROBROMIDE 20 MG/1
20 TABLET ORAL DAILY
Qty: 30 TABLET | Refills: 0 | COMMUNITY
Start: 2021-02-09 | End: 2021-03-23

## 2021-02-10 ENCOUNTER — ALLIED HEALTH/NURSE VISIT (OUTPATIENT)
Dept: FAMILY MEDICINE | Facility: CLINIC | Age: 86
End: 2021-02-10

## 2021-02-10 DIAGNOSIS — E53.9 B-COMPLEX DEFICIENCY: Primary | ICD-10-CM

## 2021-02-10 PROCEDURE — 96372 THER/PROPH/DIAG INJ SC/IM: CPT | Performed by: FAMILY MEDICINE

## 2021-02-10 RX ORDER — CYANOCOBALAMIN 1000 UG/ML
1000 INJECTION, SOLUTION INTRAMUSCULAR; SUBCUTANEOUS
Status: ACTIVE | OUTPATIENT
Start: 2021-02-10 | End: 2022-02-05

## 2021-02-10 RX ADMIN — CYANOCOBALAMIN 1000 MCG: 1000 INJECTION, SOLUTION INTRAMUSCULAR; SUBCUTANEOUS at 15:42

## 2021-02-10 NOTE — NURSING NOTE
Trisha is here for Vitamin Be 12 inj only    The following medication was given:     MEDICATION: Vitamin B12  1000mcg  ROUTE: IM  SITE: Deltoid - Left

## 2021-02-17 ENCOUNTER — IMMUNIZATION (OUTPATIENT)
Dept: PEDIATRICS | Facility: CLINIC | Age: 86
End: 2021-02-17
Payer: MEDICARE

## 2021-02-17 PROCEDURE — 91300 PR COVID VAC PFIZER DIL RECON 30 MCG/0.3 ML IM: CPT

## 2021-02-17 PROCEDURE — 0001A PR COVID VAC PFIZER DIL RECON 30 MCG/0.3 ML IM: CPT

## 2021-03-01 ENCOUNTER — TRANSFERRED RECORDS (OUTPATIENT)
Dept: FAMILY MEDICINE | Facility: CLINIC | Age: 86
End: 2021-03-01

## 2021-03-09 ENCOUNTER — OFFICE VISIT (OUTPATIENT)
Dept: FAMILY MEDICINE | Facility: CLINIC | Age: 86
End: 2021-03-09

## 2021-03-09 VITALS
DIASTOLIC BLOOD PRESSURE: 60 MMHG | RESPIRATION RATE: 20 BRPM | HEART RATE: 60 BPM | WEIGHT: 162 LBS | TEMPERATURE: 97.4 F | SYSTOLIC BLOOD PRESSURE: 110 MMHG | BODY MASS INDEX: 30.61 KG/M2

## 2021-03-09 DIAGNOSIS — E11.40 TYPE 2 DIABETES MELLITUS WITH DIABETIC NEUROPATHY, WITHOUT LONG-TERM CURRENT USE OF INSULIN (H): Primary | ICD-10-CM

## 2021-03-09 DIAGNOSIS — E78.2 MIXED HYPERLIPIDEMIA: ICD-10-CM

## 2021-03-09 DIAGNOSIS — I10 ESSENTIAL HYPERTENSION, BENIGN: ICD-10-CM

## 2021-03-09 DIAGNOSIS — G62.9 PERIPHERAL POLYNEUROPATHY: ICD-10-CM

## 2021-03-09 DIAGNOSIS — E03.9 HYPOTHYROIDISM, UNSPECIFIED TYPE: ICD-10-CM

## 2021-03-09 DIAGNOSIS — F41.1 GAD (GENERALIZED ANXIETY DISORDER): ICD-10-CM

## 2021-03-09 LAB — HBA1C MFR BLD: 6.4 % (ref 4–7)

## 2021-03-09 PROCEDURE — 83036 HEMOGLOBIN GLYCOSYLATED A1C: CPT | Performed by: FAMILY MEDICINE

## 2021-03-09 PROCEDURE — 99214 OFFICE O/P EST MOD 30 MIN: CPT | Mod: 25 | Performed by: FAMILY MEDICINE

## 2021-03-09 PROCEDURE — 96372 THER/PROPH/DIAG INJ SC/IM: CPT | Performed by: FAMILY MEDICINE

## 2021-03-09 PROCEDURE — 36415 COLL VENOUS BLD VENIPUNCTURE: CPT | Performed by: FAMILY MEDICINE

## 2021-03-09 RX ORDER — GABAPENTIN 300 MG/1
300 CAPSULE ORAL EVERY EVENING
Qty: 90 CAPSULE | Refills: 1 | Status: SHIPPED | OUTPATIENT
Start: 2021-03-09 | End: 2021-09-20

## 2021-03-09 RX ORDER — SIMVASTATIN 20 MG
TABLET ORAL
Qty: 90 TABLET | Refills: 1 | Status: SHIPPED | OUTPATIENT
Start: 2021-03-09 | End: 2021-09-20

## 2021-03-09 RX ORDER — PREGABALIN 150 MG/1
150 CAPSULE ORAL 3 TIMES DAILY
Qty: 270 CAPSULE | Refills: 1 | Status: SHIPPED | OUTPATIENT
Start: 2021-03-09 | End: 2021-09-20

## 2021-03-09 RX ORDER — LISINOPRIL 20 MG/1
20 TABLET ORAL DAILY
Qty: 90 TABLET | Refills: 1 | Status: SHIPPED | OUTPATIENT
Start: 2021-03-09 | End: 2021-09-20

## 2021-03-09 RX ADMIN — CYANOCOBALAMIN 1000 MCG: 1000 INJECTION, SOLUTION INTRAMUSCULAR; SUBCUTANEOUS at 16:05

## 2021-03-09 NOTE — PROGRESS NOTES
"    Assessment & Plan     Type 2 diabetes mellitus with diabetic neuropathy, without long-term current use of insulin (H)  Well controlled, continue current medications at current doses   - VENOUS COLLECTION  - Hemoglobin A1c (BFP)  - lisinopril (ZESTRIL) 20 MG tablet  Dispense: 90 tablet; Refill: 1  - metFORMIN (GLUCOPHAGE) 1000 MG tablet  Dispense: 180 tablet; Refill: 1  - simvastatin (ZOCOR) 20 MG tablet  Dispense: 90 tablet; Refill: 1    Mixed hyperlipidemia  Control uncertain, continue current medications at current doses   - simvastatin (ZOCOR) 20 MG tablet  Dispense: 90 tablet; Refill: 1    Essential hypertension, benign  Well controlled, continue current medications at current doses   - lisinopril (ZESTRIL) 20 MG tablet  Dispense: 90 tablet; Refill: 1    Hypothyroidism, unspecified type  stable symptomatically , continue current medications at current doses     SONYA (generalized anxiety disorder)  Well ocntrolled, continue current medications at current doses     Peripheral polyneuropathy  stable symptomatically , continue current medications at current doses   - gabapentin (NEURONTIN) 300 MG capsule  Dispense: 90 capsule; Refill: 1  - pregabalin (LYRICA) 150 MG capsule  Dispense: 270 capsule; Refill: 1  - VITAMIN B12 ([facility])      Review of external notes as documented elsewhere in note  Review of the result(s) of each unique test - labs         BMI:   Estimated body mass index is 30.61 kg/m  as calculated from the following:    Height as of 12/21/20: 1.549 m (5' 1\").    Weight as of this encounter: 73.5 kg (162 lb).   Weight management plan: Discussed healthy diet and exercise guidelines    FUTURE APPOINTMENTS:       - Follow-up visit in 6 mo  Work on weight loss  Regular exercise    No follow-ups on file.    Berhane Stevens MD  [facility]    Subjective   Trisha Vaughn is a 85 year old who presents for the following health issues  accompanied by her daughter:    HPI       Diabetes " Follow-up-metformin      How often are you checking your blood sugar? Not at all    What concerns do you have today about your diabetes? None     Do you have any of these symptoms? (Select all that apply)  Burning in feet              Hyperlipidemia Follow-Up      Are you regularly taking any medication or supplement to lower your cholesterol?   Yes- simvastatin    Are you having muscle aches or other side effects that you think could be caused by your cholesterol lowering medication?  No    Hypertension Follow-up      Do you check your blood pressure regularly outside of the clinic? No     Are you following a low salt diet? No    Are your blood pressures ever more than 140 on the top number (systolic) OR more   than 90 on the bottom number (diastolic), for example 140/90? No    BP Readings from Last 2 Encounters:   03/09/21 110/60   12/21/20 124/78     Hemoglobin A1C (%)   Date Value   12/21/2020 6.2   08/19/2020 6.4     LDL Cholesterol Calculated (mg/dL (calc))   Date Value   01/11/2019 70   12/28/2017 76     LDL Cholesterol Direct (mg/dL)   Date Value   08/19/2020 69   01/07/2020 83       Anxiety Follow-Up    How are you doing with your anxiety since your last visit? No change    Are you having other symptoms that might be associated with anxiety? No    Have you had a significant life event? No     Are you feeling depressed? No    Do you have any concerns with your use of alcohol or other drugs? No    Social History     Tobacco Use     Smoking status: Never Smoker     Smokeless tobacco: Never Used   Substance Use Topics     Alcohol use: No     Alcohol/week: 0.0 standard drinks     Comment: 0     Drug use: No     SONYA-7 SCORE 11/2/2017 1/11/2019 8/7/2019   Total Score 16 3 3     PHQ 12/28/2017 8/7/2019 1/7/2020   PHQ-9 Total Score 9 6 4   Q9: Thoughts of better off dead/self-harm past 2 weeks Not at all Not at all Not at all     Last PHQ-9 1/7/2020   1.  Little interest or pleasure in doing things 1   2.  Feeling  down, depressed, or hopeless 1   3.  Trouble falling or staying asleep, or sleeping too much 1   4.  Feeling tired or having little energy 1   5.  Poor appetite or overeating 0   6.  Feeling bad about yourself 0   7.  Trouble concentrating 0   8.  Moving slowly or restless 0   Q9: Thoughts of better off dead/self-harm past 2 weeks 0   PHQ-9 Total Score 4   Difficulty at work, home, or with people Somewhat difficult     SONYA-7  8/7/2019   1. Feeling nervous, anxious, or on edge 0   2. Not being able to stop or control worrying 1   3. Worrying too much about different things 1   4. Trouble relaxing 0   5. Being so restless that it is hard to sit still 0   6. Becoming easily annoyed or irritable 0   7. Feeling afraid, as if something awful might happen 1   SONYA-7 Total Score 3   If you checked any problems, how difficult have they made it for you to do your work, take care of things at home, or get along with other people? Not difficult at all       Hypothyroidism Follow-up      Since last visit, patient describes the following symptoms: Weight stable, no hair loss, no skin changes, no constipation, no loose stools      How many servings of fruits and vegetables do you eat daily?  2-3    On average, how many sweetened beverages do you drink each day (Examples: soda, juice, sweet tea, etc.  Do NOT count diet or artificially sweetened beverages)?   1    How many days per week do you exercise enough to make your heart beat faster? 3 or less    How many minutes a day do you exercise enough to make your heart beat faster? 9 or less    How many days per week do you miss taking your medication? 0    Pt has hx BPV-has seen dizzy clinic in past    Review of Systems   Constitutional, HEENT, cardiovascular, pulmonary, gi and gu systems are negative, except as otherwise noted.      Objective    /60 (BP Location: Right arm, Patient Position: Chair, Cuff Size: Adult Regular)   Pulse 60   Temp 97.4  F (36.3  C)   Resp 20   Wt  73.5 kg (162 lb)   BMI 30.61 kg/m    Body mass index is 30.61 kg/m .  Physical Exam   GENERAL: healthy, alert and no distress  EYES: Eyes grossly normal to inspection, PERRL and conjunctivae and sclerae normal  HENT: ear canals and TM's normal, nose and mouth without ulcers or lesions  NECK: no adenopathy, no asymmetry, masses, or scars and thyroid normal to palpation  RESP: lungs clear to auscultation - no rales, rhonchi or wheezes  CV: regular rate and rhythm, normal S1 S2, no S3 or S4, no murmur, click or rub, no peripheral edema and peripheral pulses strong  ABDOMEN: soft, nontender, no hepatosplenomegaly, no masses and bowel sounds normal  MS: no gross musculoskeletal defects noted, no edema  SKIN: no suspicious lesions or rashes  NEURO: Normal strength and tone, mentation intact and speech normal  PSYCH: mentation appears normal, affect normal/bright    Results for orders placed or performed in visit on 03/09/21 (from the past 24 hour(s))   Hemoglobin A1c (BFP)   Result Value Ref Range    Hemoglobin A1C 6.4 4.0 - 7.0 %

## 2021-03-09 NOTE — NURSING NOTE
Trisha Lucia is here for a medication check and B12 injection.    Questioned patient about current smoking habits.  Pt. has never smoked.  PULSE regular  My Chart: active  CLASSIFICATION OF OVERWEIGHT AND OBESITY BY BMI                        Obesity Class           BMI(kg/m2)  Underweight                                    < 18.5  Normal                                         18.5-24.9  Overweight                                     25.0-29.9  OBESITY                     I                  30.0-34.9                             II                 35.0-39.9  EXTREME OBESITY             III                >40                            Patient's  BMI Body mass index is 30.61 kg/m .  http://hin.nhlbi.nih.gov/menuplanner/menu.cgi  Pre-visit planning  Immunizations - up to date  Colonoscopy -   Mammogram -   Asthma -   PHQ9 -    SONYA-7 -    Hearing Test -

## 2021-03-10 ENCOUNTER — IMMUNIZATION (OUTPATIENT)
Dept: PEDIATRICS | Facility: CLINIC | Age: 86
End: 2021-03-10
Attending: INTERNAL MEDICINE
Payer: MEDICARE

## 2021-03-10 LAB — VIT B12 SERPL-MCNC: 487 PG/ML (ref 200–1100)

## 2021-03-10 PROCEDURE — 91300 PR COVID VAC PFIZER DIL RECON 30 MCG/0.3 ML IM: CPT

## 2021-03-10 PROCEDURE — 0002A PR COVID VAC PFIZER DIL RECON 30 MCG/0.3 ML IM: CPT

## 2021-03-23 DIAGNOSIS — F41.1 GAD (GENERALIZED ANXIETY DISORDER): ICD-10-CM

## 2021-03-23 RX ORDER — CITALOPRAM HYDROBROMIDE 20 MG/1
20 TABLET ORAL DAILY
Qty: 90 TABLET | Refills: 1 | Status: SHIPPED | OUTPATIENT
Start: 2021-03-23 | End: 2021-09-20

## 2021-03-23 NOTE — TELEPHONE ENCOUNTER
Trisha Lucia is requesting a refill of:    Pending Prescriptions:                       Disp   Refills    citalopram (CELEXA) 20 MG tablet          90 tab*1            Sig: Take 1 tablet (20 mg) by mouth daily    Can you review for JCC?  Pt was here on 3/9/21    Thanks,Destiny

## 2021-04-14 ENCOUNTER — ALLIED HEALTH/NURSE VISIT (OUTPATIENT)
Dept: FAMILY MEDICINE | Facility: CLINIC | Age: 86
End: 2021-04-14

## 2021-04-14 DIAGNOSIS — E53.9 B-COMPLEX DEFICIENCY: Primary | ICD-10-CM

## 2021-04-14 PROCEDURE — 96372 THER/PROPH/DIAG INJ SC/IM: CPT | Performed by: FAMILY MEDICINE

## 2021-04-14 RX ADMIN — CYANOCOBALAMIN 1000 MCG: 1000 INJECTION, SOLUTION INTRAMUSCULAR; SUBCUTANEOUS at 14:39

## 2021-04-14 NOTE — NURSING NOTE
The following medication was given:     MEDICATION: Vitamin B12  1000mcg  ROUTE: IM  SITE: Deltoid - Left  DOSE: 1mL  LOT #: [id number]  :  American [place]  EXPIRATION DATE:  4/2022  NDC#: 6038-2606-51

## 2021-04-22 ENCOUNTER — OFFICE VISIT (OUTPATIENT)
Dept: FAMILY MEDICINE | Facility: CLINIC | Age: 86
End: 2021-04-22

## 2021-04-22 DIAGNOSIS — R30.0 DYSURIA: Primary | ICD-10-CM

## 2021-04-22 LAB
ALBUMIN (URINE): ABNORMAL MG/DL
APPEARANCE UR: ABNORMAL
BACTERIA, UR: ABNORMAL
BILIRUB UR QL: ABNORMAL
CASTS/LPF: 0
COLOR UR: YELLOW
EP/HPF: ABNORMAL
GLUCOSE URINE: ABNORMAL MG/DL
HGB UR QL: ABNORMAL
KETONES UR QL: ABNORMAL MG/DL
LEUKOCYTE ESTERASE - QUEST: ABNORMAL
MISC.: 0
NITRITE UR QL STRIP: ABNORMAL
PH UR STRIP: 5.5 PH (ref 5–7)
RBC, UR MICRO: ABNORMAL (ref ?–2)
SP. GRAVITY: 1.02
UROBILINOGEN UR QL STRIP: 0.2 EU/DL (ref 0.2–1)
WBC, UR MICRO: ABNORMAL (ref ?–2)

## 2021-04-22 PROCEDURE — 81001 URINALYSIS AUTO W/SCOPE: CPT | Performed by: PHYSICIAN ASSISTANT

## 2021-04-22 PROCEDURE — 99213 OFFICE O/P EST LOW 20 MIN: CPT | Performed by: PHYSICIAN ASSISTANT

## 2021-04-22 ASSESSMENT — MIFFLIN-ST. JEOR: SCORE: 1112.1

## 2021-04-22 NOTE — PROGRESS NOTES
"CC: UTI?    History:  4-5 days ago started getting some symptoms concerning for a urinary infection. Was getting irritation with urinating, urgency, low abdomen pressure. No change in frequency. No blood in urine. No back pain. No fever, sweats, chills, nausea, vomiting. Woke up this morning, and not feeling any more irritation. Feels back to normal. Wants to be sure this is not a UTI. Does admit that she has some general incontinence, but only small amount of leakage. Does not fully lose control of bladder.  Denies any vaginal symptoms, discharge.     PMH, MEDICATIONS, ALLERGIES, SOCIAL AND FAMILY HISTORY in [facility] and reviewed by me personally.    ROS negative other than the symptoms noted above in the HPI.    Examination   /56 (BP Location: Left arm, Patient Position: Sitting)   Pulse 70   Temp 97.5  F (36.4  C)   Ht 1.556 m (5' 1.25\")   Wt 72.6 kg (160 lb)   SpO2 95%   BMI 29.99 kg/m     Pt was here earlier in the day and was roomed, and vital signs were taken and WNL, then had to return for visit later when she could leave sample and vital signs were never recorded in chart.     Constitutional: Sitting comfortably, in no acute distress.   Neck:  no adenopathy, trachea midline and normal to palpation, thyroid normal to palpation  Cardiovascular:  regular rate and rhythm, no murmurs, clicks, or gallops  Respiratory:  normal respiratory rate and rhythm, lungs clear to auscultation  M/S: No CVA tenderness  Psychiatric: mentation appears normal and affect normal/bright      A/P    ICD-10-CM    1. Dysuria  R30.0 HCL  Urinalysis, Routine (BFP)     Urine Culture Aerobic Bacterial       DISCUSSION:  UA today appears normal. Will culture to confirm, and contact pt with results. Asked her to contact me if symptoms worsen and could consider starting antibiotic, but suspect this was more GI in nature, atrophic vaginitis, or was infected that was cleared without intervention.     follow up visit: As " needed    Jane Rhodes PA-C  [facility]

## 2021-04-23 VITALS
HEIGHT: 61 IN | WEIGHT: 160 LBS | DIASTOLIC BLOOD PRESSURE: 56 MMHG | SYSTOLIC BLOOD PRESSURE: 100 MMHG | TEMPERATURE: 97.5 F | OXYGEN SATURATION: 95 % | BODY MASS INDEX: 30.21 KG/M2 | HEART RATE: 70 BPM

## 2021-04-24 LAB — URINE VOIDED CULTURE: NORMAL

## 2021-05-08 ENCOUNTER — HEALTH MAINTENANCE LETTER (OUTPATIENT)
Age: 86
End: 2021-05-08

## 2021-05-18 ENCOUNTER — ALLIED HEALTH/NURSE VISIT (OUTPATIENT)
Dept: FAMILY MEDICINE | Facility: CLINIC | Age: 86
End: 2021-05-18

## 2021-05-18 ENCOUNTER — TELEPHONE (OUTPATIENT)
Dept: FAMILY MEDICINE | Facility: CLINIC | Age: 86
End: 2021-05-18

## 2021-05-18 DIAGNOSIS — E53.9 B-COMPLEX DEFICIENCY: Primary | ICD-10-CM

## 2021-05-18 PROCEDURE — 96372 THER/PROPH/DIAG INJ SC/IM: CPT | Performed by: FAMILY MEDICINE

## 2021-05-18 RX ADMIN — CYANOCOBALAMIN 1000 MCG: 1000 INJECTION, SOLUTION INTRAMUSCULAR; SUBCUTANEOUS at 15:36

## 2021-05-18 NOTE — TELEPHONE ENCOUNTER
We are not prescribing any opioids- why is she asking neurology.  Sounds very concerning.  I would recommend she be scheduled for appt and mini mental eval

## 2021-05-18 NOTE — NURSING NOTE
The following medication was given:     MEDICATION: Vitamin B12  1,000 mcg  ROUTE: IM  SITE: Deltoid - Right  DOSE: 1 mL  LOT #: [id number]  :  American [place]  EXPIRATION DATE:  4/1/2022  NDC#: 7642-7990-62

## 2021-05-18 NOTE — TELEPHONE ENCOUNTER
"[facility], Sienna who works with Dr. Angela, called stating that Trisha called their office today requesting a refill of Oxycontin and hydrocodone. There were concerns because she was struggling to find words and kept calling the meds \"vitamins\". Sienna asked if Trisha was seen recently for something related to this. I informed her she was seen for a UTI about a month ago.      Dr. Stevens, do you have any medical advice regarding this?      You can route to Destiny or Haleigh, I am leaving for the day soon.      [facility] doc line phone # [phone]  "

## 2021-05-19 NOTE — TELEPHONE ENCOUNTER
Per Kayleigh, she spoke to patients daughter and was able to figure things out. There was a misunderstanding with this but it is figured out now.

## 2021-06-16 ENCOUNTER — ALLIED HEALTH/NURSE VISIT (OUTPATIENT)
Dept: FAMILY MEDICINE | Facility: CLINIC | Age: 86
End: 2021-06-16

## 2021-06-16 DIAGNOSIS — E53.9 B-COMPLEX DEFICIENCY: Primary | ICD-10-CM

## 2021-06-16 PROCEDURE — 96372 THER/PROPH/DIAG INJ SC/IM: CPT | Performed by: FAMILY MEDICINE

## 2021-06-16 RX ADMIN — CYANOCOBALAMIN 1000 MCG: 1000 INJECTION, SOLUTION INTRAMUSCULAR; SUBCUTANEOUS at 14:27

## 2021-06-16 NOTE — NURSING NOTE
The following medication was given:     MEDICATION: Vitamin B12  1,000 mcg  ROUTE: IM  SITE: Deltoid - Left  DOSE: 1 mL  LOT #: [id number]  :  American [place]  EXPIRATION DATE:  4/2022  NDC#: 6939-1598-59

## 2021-07-21 ENCOUNTER — ALLIED HEALTH/NURSE VISIT (OUTPATIENT)
Dept: FAMILY MEDICINE | Facility: CLINIC | Age: 86
End: 2021-07-21

## 2021-07-21 DIAGNOSIS — E53.9 B-COMPLEX DEFICIENCY: Primary | ICD-10-CM

## 2021-07-21 PROCEDURE — 96372 THER/PROPH/DIAG INJ SC/IM: CPT | Performed by: FAMILY MEDICINE

## 2021-07-21 RX ADMIN — CYANOCOBALAMIN 1000 MCG: 1000 INJECTION, SOLUTION INTRAMUSCULAR; SUBCUTANEOUS at 15:11

## 2021-07-21 NOTE — NURSING NOTE
The following medication was given:     MEDICATION: Vitamin B12  1,000 mcg  ROUTE: IM  SITE: Deltoid - Right  DOSE: 1 mL  LOT #: [id number]  :  American [place]  EXPIRATION DATE:  8/1/2022  NDC#: 5492-1489-36

## 2021-08-25 ENCOUNTER — ALLIED HEALTH/NURSE VISIT (OUTPATIENT)
Dept: FAMILY MEDICINE | Facility: CLINIC | Age: 86
End: 2021-08-25

## 2021-08-25 DIAGNOSIS — E53.9 B-COMPLEX DEFICIENCY: Primary | ICD-10-CM

## 2021-08-25 DIAGNOSIS — D51.0 PERNICIOUS ANEMIA: ICD-10-CM

## 2021-08-28 DIAGNOSIS — E03.9 HYPOTHYROIDISM, UNSPECIFIED TYPE: ICD-10-CM

## 2021-08-30 RX ORDER — LEVOTHYROXINE SODIUM 112 UG/1
TABLET ORAL
Qty: 30 TABLET | Refills: 0 | COMMUNITY
Start: 2021-08-30 | End: 2021-10-20

## 2021-08-30 NOTE — TELEPHONE ENCOUNTER
Pt has an upcoming appointment on 9/20/2021. Called in a 30 day supply only.      Signed Prescriptions:                        Disp   Refills    levothyroxine (SYNTHROID/LEVOTHROID) 112 M*30 tab*0        Sig: Take 1 tablet by mouth daily.  Authorizing Provider: BERKLEY JUAREZ  Ordering User: BRIANA HAJI

## 2021-09-10 DIAGNOSIS — E11.40 TYPE 2 DIABETES MELLITUS WITH DIABETIC NEUROPATHY, WITHOUT LONG-TERM CURRENT USE OF INSULIN (H): ICD-10-CM

## 2021-09-10 DIAGNOSIS — E78.2 MIXED HYPERLIPIDEMIA: ICD-10-CM

## 2021-09-10 RX ORDER — SIMVASTATIN 20 MG
TABLET ORAL
Qty: 90 TABLET | Refills: 0 | COMMUNITY
Start: 2021-09-10

## 2021-09-10 NOTE — TELEPHONE ENCOUNTER
Trisha Lucia is requesting a refill of:    Refused Prescriptions:                       Disp   Refills    metFORMIN (GLUCOPHAGE) 1000 MG tablet [Pha*180 ta*0        Sig: Take 1 tablet (1,000 mg) by mouth 2 times daily (with           meals)  Refused By: CLARK ZAMBRANO  Reason for Refusal: Patient should contact provider first    simvastatin (ZOCOR) 20 MG tablet [Pharmacy*90 tab*0        Sig: TAKE 1 TABLET (20 MG) BY MOUTH AT BEDTIME  Refused By: CLARK ZAMBRANO  Reason for Refusal: Patient should contact provider first      Pt has appt scheduled for 09/20/21 with [facility], left voicemail asking if she needs short extension. Also ntoiced appt is at 3PM asked pt if she will be fasting as overdue for fasting labs. May want to reschedule

## 2021-09-13 ENCOUNTER — TRANSFERRED RECORDS (OUTPATIENT)
Dept: FAMILY MEDICINE | Facility: CLINIC | Age: 86
End: 2021-09-13

## 2021-09-17 DIAGNOSIS — F41.1 GAD (GENERALIZED ANXIETY DISORDER): ICD-10-CM

## 2021-09-17 RX ORDER — CITALOPRAM HYDROBROMIDE 20 MG/1
20 TABLET ORAL DAILY
Qty: 90 TABLET | Refills: 0 | COMMUNITY
Start: 2021-09-17

## 2021-09-17 NOTE — TELEPHONE ENCOUNTER
3/9/2021, needs 6 month OV. Pt has appt 9/20/2021    Refused Prescriptions:                       Disp   Refills    citalopram (CELEXA) 20 MG tablet [Pharmacy*90 tab*0        Sig: Take 1 tablet (20 mg) by mouth daily  Refused By: LAMONTE AGUERO  Reason for Refusal: OTHER  Reason for Refusal Comment: should call for short supply

## 2021-09-20 ENCOUNTER — OFFICE VISIT (OUTPATIENT)
Dept: FAMILY MEDICINE | Facility: CLINIC | Age: 86
End: 2021-09-20

## 2021-09-20 VITALS
OXYGEN SATURATION: 97 % | TEMPERATURE: 97.1 F | SYSTOLIC BLOOD PRESSURE: 120 MMHG | DIASTOLIC BLOOD PRESSURE: 62 MMHG | BODY MASS INDEX: 29.64 KG/M2 | HEIGHT: 61 IN | HEART RATE: 65 BPM | WEIGHT: 157 LBS

## 2021-09-20 DIAGNOSIS — E78.2 MIXED HYPERLIPIDEMIA: ICD-10-CM

## 2021-09-20 DIAGNOSIS — I10 ESSENTIAL HYPERTENSION, BENIGN: ICD-10-CM

## 2021-09-20 DIAGNOSIS — F41.1 GAD (GENERALIZED ANXIETY DISORDER): ICD-10-CM

## 2021-09-20 DIAGNOSIS — E03.9 HYPOTHYROIDISM, UNSPECIFIED TYPE: ICD-10-CM

## 2021-09-20 DIAGNOSIS — R30.0 DYSURIA: ICD-10-CM

## 2021-09-20 DIAGNOSIS — E11.40 TYPE 2 DIABETES MELLITUS WITH DIABETIC NEUROPATHY, WITHOUT LONG-TERM CURRENT USE OF INSULIN (H): Primary | ICD-10-CM

## 2021-09-20 DIAGNOSIS — G62.9 PERIPHERAL POLYNEUROPATHY: ICD-10-CM

## 2021-09-20 DIAGNOSIS — R42 DIZZINESS: ICD-10-CM

## 2021-09-20 LAB
ALBUMIN SERPL-MCNC: 4.1 G/DL (ref 3.6–5.1)
ALBUMIN/GLOB SERPL: 1.6 {RATIO} (ref 1–2.5)
ALP SERPL-CCNC: 65 U/L (ref 33–130)
ALT 1742-6: 8 U/L (ref 0–32)
APPEARANCE UR: ABNORMAL
AST 1920-8: 15 U/L (ref 0–35)
BACTERIA, UR: ABNORMAL
BILIRUB SERPL-MCNC: 0.5 MG/DL (ref 0.2–1.2)
BILIRUB UR QL: ABNORMAL
BUN SERPL-MCNC: 17 MG/DL (ref 7–25)
BUN/CREATININE RATIO: 22.7 (ref 6–22)
CALCIUM SERPL-MCNC: 9.9 MG/DL (ref 8.6–10.3)
CASTS/LPF: 0
CHLORIDE SERPLBLD-SCNC: 101.9 MMOL/L (ref 98–110)
CO2 SERPL-SCNC: 26.7 MMOL/L (ref 20–32)
COLOR UR: YELLOW
CREAT SERPL-MCNC: 0.75 MG/DL (ref 0.6–1.3)
EP/HPF: ABNORMAL
GLOBULIN, CALCULATED - QUEST: 2.6 (ref 1.9–3.7)
GLUCOSE SERPL-MCNC: 127 MG/DL (ref 60–99)
GLUCOSE URINE: ABNORMAL MG/DL
HBA1C MFR BLD: 6.1 % (ref 4–7)
HGB UR QL: ABNORMAL
KETONES UR QL: ABNORMAL MG/DL
MISC.: ABNORMAL
NITRITE UR QL STRIP: ABNORMAL
PH UR STRIP: 7.5 PH (ref 5–7)
POTASSIUM SERPL-SCNC: 4.97 MMOL/L (ref 3.5–5.3)
PROT SERPL-MCNC: 6.7 G/DL (ref 6.1–8.1)
PROT UR QL: ABNORMAL MG/DL
RBC, UR MICRO: ABNORMAL (ref ?–2)
SODIUM SERPL-SCNC: 138.4 MMOL/L (ref 135–146)
SP GR UR STRIP: 1.02 (ref 1–1.03)
UROBILINOGEN UR QL STRIP: 0.2 EU/DL (ref 0.2–1)
WBC #/AREA URNS HPF: ABNORMAL /[HPF]
WBC, UR MICRO: ABNORMAL (ref ?–2)

## 2021-09-20 PROCEDURE — 99214 OFFICE O/P EST MOD 30 MIN: CPT | Mod: 25 | Performed by: FAMILY MEDICINE

## 2021-09-20 PROCEDURE — 83036 HEMOGLOBIN GLYCOSYLATED A1C: CPT | Performed by: FAMILY MEDICINE

## 2021-09-20 PROCEDURE — 36415 COLL VENOUS BLD VENIPUNCTURE: CPT | Performed by: FAMILY MEDICINE

## 2021-09-20 PROCEDURE — 96372 THER/PROPH/DIAG INJ SC/IM: CPT | Performed by: FAMILY MEDICINE

## 2021-09-20 PROCEDURE — 81001 URINALYSIS AUTO W/SCOPE: CPT | Performed by: FAMILY MEDICINE

## 2021-09-20 PROCEDURE — 80053 COMPREHEN METABOLIC PANEL: CPT | Performed by: FAMILY MEDICINE

## 2021-09-20 RX ORDER — GABAPENTIN 300 MG/1
300 CAPSULE ORAL EVERY EVENING
Qty: 90 CAPSULE | Refills: 1 | Status: SHIPPED | OUTPATIENT
Start: 2021-09-20 | End: 2022-03-16

## 2021-09-20 RX ORDER — LISINOPRIL 20 MG/1
20 TABLET ORAL DAILY
Qty: 90 TABLET | Refills: 1 | Status: SHIPPED | OUTPATIENT
Start: 2021-09-20 | End: 2022-03-16

## 2021-09-20 RX ORDER — PREGABALIN 150 MG/1
150 CAPSULE ORAL 3 TIMES DAILY
Qty: 270 CAPSULE | Refills: 1 | Status: SHIPPED | OUTPATIENT
Start: 2021-09-20 | End: 2022-03-16

## 2021-09-20 RX ORDER — SIMVASTATIN 20 MG
TABLET ORAL
Qty: 90 TABLET | Refills: 1 | Status: SHIPPED | OUTPATIENT
Start: 2021-09-20 | End: 2022-03-16

## 2021-09-20 RX ORDER — CITALOPRAM HYDROBROMIDE 20 MG/1
20 TABLET ORAL DAILY
Qty: 90 TABLET | Refills: 1 | Status: SHIPPED | OUTPATIENT
Start: 2021-09-20 | End: 2022-03-16

## 2021-09-20 RX ADMIN — CYANOCOBALAMIN 1000 MCG: 1000 INJECTION, SOLUTION INTRAMUSCULAR; SUBCUTANEOUS at 17:16

## 2021-09-20 ASSESSMENT — ANXIETY QUESTIONNAIRES
2. NOT BEING ABLE TO STOP OR CONTROL WORRYING: NOT AT ALL
GAD7 TOTAL SCORE: 1
5. BEING SO RESTLESS THAT IT IS HARD TO SIT STILL: NOT AT ALL
IF YOU CHECKED OFF ANY PROBLEMS ON THIS QUESTIONNAIRE, HOW DIFFICULT HAVE THESE PROBLEMS MADE IT FOR YOU TO DO YOUR WORK, TAKE CARE OF THINGS AT HOME, OR GET ALONG WITH OTHER PEOPLE: NOT DIFFICULT AT ALL
6. BECOMING EASILY ANNOYED OR IRRITABLE: NOT AT ALL
7. FEELING AFRAID AS IF SOMETHING AWFUL MIGHT HAPPEN: SEVERAL DAYS
1. FEELING NERVOUS, ANXIOUS, OR ON EDGE: NOT AT ALL
3. WORRYING TOO MUCH ABOUT DIFFERENT THINGS: NOT AT ALL

## 2021-09-20 ASSESSMENT — PATIENT HEALTH QUESTIONNAIRE - PHQ9
5. POOR APPETITE OR OVEREATING: NOT AT ALL
SUM OF ALL RESPONSES TO PHQ QUESTIONS 1-9: 10

## 2021-09-20 ASSESSMENT — MIFFLIN-ST. JEOR: SCORE: 1093.49

## 2021-09-20 NOTE — NURSING NOTE
The following medication was given:     MEDICATION: Vitamin B12  1000 mcg  ROUTE: IM  SITE: Deltoid - Right  DOSE: 1000 mcg  LOT #: [id number]  :  American [place]  EXPIRATION DATE:  8/1/22  NDC#: 5160-1989-73    Given by: Haleigh Thrasher CMA

## 2021-09-20 NOTE — PROGRESS NOTES
"    Assessment & Plan     Type 2 diabetes mellitus with diabetic neuropathy, without long-term current use of insulin (H)  Well controlled, Continue to work on healthy diet and exercise, discussed healthy habits   - HEMOGLOBIN A1C (BFP)  - VENOUS COLLECTION    Mixed hyperlipidemia  Controlled, continue current medications at current doses     Essential hypertension, benign  Well controlled, continue current medications at current doses     SONYA (generalized anxiety disorder)  Well controlled, continue current medications at current doses     Hypothyroidism, unspecified type  stable symptomatically continue current medications at current doses     Dysuria  Likely normal UA, will get cx, no meds unless cx pos  - URINALYSIS, ROUTINE (BFP)    Peripheral polyneuropathy  Stable, continue current medications at current doses     Dizziness-refer back to Dizzy and balance      Review of external notes as documented elsewhere in note  Review of the result(s) of each unique test - labs  Ordering of each unique test  Prescription drug management         BMI:   Estimated body mass index is 29.42 kg/m  as calculated from the following:    Height as of this encounter: 1.556 m (5' 1.25\").    Weight as of this encounter: 71.2 kg (157 lb).       FUTURE APPOINTMENTS:       - Follow-up visit in 6 mo  Regular exercise    No follow-ups on file.    Berhane Stevens MD  [facility]    Subjective   Trisha Vaughn is a 86 year old who presents for the following health issues  accompanied by her daughter:    HPI     Diabetes Follow-up      How often are you checking your blood sugar? Not at all    What concerns do you have today about your diabetes? None     Do you have any of these symptoms? (Select all that apply)  Numbness in feet        Hyperlipidemia Follow-Up      Are you regularly taking any medication or supplement to lower your cholesterol?   Yes- simvastatin    Are you having muscle aches or other side effects " that you think could be caused by your cholesterol lowering medication?  No    Hypertension [id number] noting dizziness on standing      Do you check your blood pressure regularly outside of the clinic? No     Are you following a low salt diet? No    Are your blood pressures ever more than 140 on the top number (systolic) OR more   than 90 on the bottom number (diastolic), for example 140/90? No    BP Readings from Last 2 Encounters:   09/20/21 120/62   04/22/21 100/56     Hemoglobin A1C (%)   Date Value   03/09/2021 6.4   12/21/2020 6.2     LDL Cholesterol Calculated (mg/dL (calc))   Date Value   01/11/2019 70   12/28/2017 76     LDL Cholesterol Direct (mg/dL)   Date Value   08/19/2020 69   01/07/2020 83       Anxiety Follow-Up    How are you doing with your anxiety since your last visit? No change    Are you having other symptoms that might be associated with anxiety? No    Have you had a significant life event? No     Are you feeling depressed? No    Do you have any concerns with your use of alcohol or other drugs? No    Social History     Tobacco Use     Smoking status: Never Smoker     Smokeless tobacco: Never Used   Substance Use Topics     Alcohol use: No     Alcohol/week: 0.0 standard drinks     Comment: 0     Drug use: No     SONYA-7 SCORE 11/2/2017 1/11/2019 8/7/2019   Total Score 16 3 3     PHQ 12/28/2017 8/7/2019 1/7/2020   PHQ-9 Total Score 9 6 4   Q9: Thoughts of better off dead/self-harm past 2 weeks Not at all Not at all Not at all     See screeners    Hypothyroidism Follow-up      Since last visit, patient describes the following symptoms: Weight stable, no hair loss, no skin changes, no constipation, no loose stools      How many servings of fruits and vegetables do you eat daily?  2-3    On average, how many sweetened beverages do you drink each day (Examples: soda, juice, sweet tea, etc.  Do NOT count diet or artificially sweetened beverages)?   1    How many days per week do you exercise enough  "to make your heart beat faster? 3 or less    How many minutes a day do you exercise enough to make your heart beat faster? 9 or less    How many days per week do you miss taking your medication? 0    Neuropathy stable as long as taking lyrica    Pt c/o dizziness, more lightheaded whenever she rises, has hx peripheral vertigo some years back but this is daily now, she is worried about effects on balance    Review of Systems   Constitutional, HEENT, cardiovascular, pulmonary, gi and gu systems are negative, except as otherwise noted.      Objective    /62   Pulse 65   Temp 97.1  F (36.2  C) (Temporal)   Ht 1.556 m (5' 1.25\")   Wt 71.2 kg (157 lb)   SpO2 97%   BMI 29.42 kg/m    Body mass index is 29.42 kg/m .  Physical Exam   GENERAL: healthy, alert and no distress  EYES: Eyes grossly normal to inspection, PERRL and conjunctivae and sclerae normal  HENT: ear canals and TM's normal, nose and mouth without ulcers or lesions  NECK: no adenopathy, no asymmetry, masses, or scars and thyroid normal to palpation  RESP: lungs clear to auscultation - no rales, rhonchi or wheezes  CV: regular rate and rhythm, normal S1 S2, no S3 or S4, no murmur, click or rub, no peripheral edema and peripheral pulses strong  ABDOMEN: soft, nontender, no hepatosplenomegaly, no masses and bowel sounds normal  MS: no gross musculoskeletal defects noted, no edema  SKIN: no suspicious lesions or rashes  NEURO: Normal strength and tone, mentation intact and speech normal  PSYCH: mentation appears normal, affect normal/bright    Results for orders placed or performed in visit on 09/20/21 (from the past 24 hour(s))   HEMOGLOBIN A1C (BFP)   Result Value Ref Range    Hemoglobin A1C 6.1 4.0 - 7.0 %       Orthostatic in chart-does not show orthostatic changes          "

## 2021-09-20 NOTE — NURSING NOTE
Chief Complaint   Patient presents with     Recheck Medication         Pre-visit Screening:  Immunizations:  up to date  Colonoscopy:  is up to date  Mammogram: is up to date  Asthma Action Test/Plan:  NA  PHQ9:  Done today  GAD7:  Done today  Questioned patient about current smoking habits Pt. has never smoked.  Ok to leave detailed message on voice mail for today's visit only Yes, phone # [phone]

## 2021-09-21 ASSESSMENT — ANXIETY QUESTIONNAIRES: GAD7 TOTAL SCORE: 1

## 2021-09-23 LAB
T4, FREE, NON-DIALYSIS - QUEST: 1.5 NG/DL (ref 0.8–1.8)
TSH SERPL-ACNC: 0.13 MIU/L (ref 0.4–4.5)
URINE VOIDED CULTURE: NORMAL

## 2021-10-11 ENCOUNTER — TRANSFERRED RECORDS (OUTPATIENT)
Dept: FAMILY MEDICINE | Facility: CLINIC | Age: 86
End: 2021-10-11

## 2021-10-19 DIAGNOSIS — E03.9 HYPOTHYROIDISM, UNSPECIFIED TYPE: ICD-10-CM

## 2021-10-20 RX ORDER — LEVOTHYROXINE SODIUM 112 UG/1
TABLET ORAL
Qty: 90 TABLET | Refills: 1 | Status: SHIPPED | OUTPATIENT
Start: 2021-10-20 | End: 2022-03-16

## 2021-10-20 NOTE — TELEPHONE ENCOUNTER
Can you review for [facility]    Trisha Lucia is requesting a refill of:    Pending Prescriptions:                       Disp   Refills    levothyroxine (SYNTHROID/LEVOTHROID) 112 *90 tab*1            Sig: TAKE ONE TABLET BY MOUTH ONE TIME DAILY    TSH   Date Value Ref Range Status   09/20/2021 0.13 (L) 0.40 - 4.50 mIU/L Final     T4 Free, Non-Dialysis   Date Value Ref Range Status   09/20/2021 1.5 0.8 - 1.8 ng/dL Final

## 2021-10-26 ENCOUNTER — ALLIED HEALTH/NURSE VISIT (OUTPATIENT)
Dept: FAMILY MEDICINE | Facility: CLINIC | Age: 86
End: 2021-10-26

## 2021-10-26 DIAGNOSIS — E53.9 B-COMPLEX DEFICIENCY: Primary | ICD-10-CM

## 2021-10-26 PROCEDURE — 96372 THER/PROPH/DIAG INJ SC/IM: CPT | Performed by: FAMILY MEDICINE

## 2021-10-26 RX ADMIN — CYANOCOBALAMIN 1000 MCG: 1000 INJECTION, SOLUTION INTRAMUSCULAR; SUBCUTANEOUS at 15:05

## 2021-10-26 NOTE — NURSING NOTE
The following medication was given:     MEDICATION: Vitamin B12  1000mcg  ROUTE: IM  SITE: Deltoid - Left  DOSE: 1ML  LOT #: [id number]  :  American [place]  EXPIRATION DATE:  08/01/22  NDC#: 5254-2814-47

## 2021-11-24 ENCOUNTER — ALLIED HEALTH/NURSE VISIT (OUTPATIENT)
Dept: FAMILY MEDICINE | Facility: CLINIC | Age: 86
End: 2021-11-24

## 2021-11-24 DIAGNOSIS — E53.9 B-COMPLEX DEFICIENCY: Primary | ICD-10-CM

## 2021-11-24 PROCEDURE — 96372 THER/PROPH/DIAG INJ SC/IM: CPT | Performed by: FAMILY MEDICINE

## 2021-11-24 RX ADMIN — CYANOCOBALAMIN 1000 MCG: 1000 INJECTION, SOLUTION INTRAMUSCULAR; SUBCUTANEOUS at 16:06

## 2021-11-24 NOTE — NURSING NOTE
The following medication was given:     MEDICATION: Vitamin B12  1000mcg  ROUTE: IM  SITE: Deltoid - Right  DOSE: 1ML  LOT #: [id number]  :  American [place]  EXPIRATION DATE:  01/01/23  NDC#: 2527-7441-65

## 2021-12-21 ENCOUNTER — ALLIED HEALTH/NURSE VISIT (OUTPATIENT)
Dept: FAMILY MEDICINE | Facility: CLINIC | Age: 86
End: 2021-12-21

## 2021-12-21 DIAGNOSIS — E53.9 B-COMPLEX DEFICIENCY: Primary | ICD-10-CM

## 2021-12-21 PROCEDURE — 96372 THER/PROPH/DIAG INJ SC/IM: CPT | Performed by: FAMILY MEDICINE

## 2021-12-21 RX ADMIN — CYANOCOBALAMIN 1000 MCG: 1000 INJECTION, SOLUTION INTRAMUSCULAR; SUBCUTANEOUS at 15:47

## 2021-12-21 NOTE — NURSING NOTE
The following medication was given:     MEDICATION: Vitamin B12  1000mcg  ROUTE: IM  SITE: Deltoid - Left  DOSE: 1ML  LOT #: [id number]  :  [employer] [place]  EXPIRATION DATE:  01/01/23  NDC#: 1048-7123-52

## 2022-01-19 ENCOUNTER — ALLIED HEALTH/NURSE VISIT (OUTPATIENT)
Dept: FAMILY MEDICINE | Facility: CLINIC | Age: 87
End: 2022-01-19

## 2022-01-19 DIAGNOSIS — E53.9 B-COMPLEX DEFICIENCY: Primary | ICD-10-CM

## 2022-01-19 PROCEDURE — 96372 THER/PROPH/DIAG INJ SC/IM: CPT | Performed by: FAMILY MEDICINE

## 2022-01-19 RX ADMIN — CYANOCOBALAMIN 1000 MCG: 1000 INJECTION, SOLUTION INTRAMUSCULAR; SUBCUTANEOUS at 15:35

## 2022-01-19 NOTE — NURSING NOTE
The following medication was given:     MEDICATION: Vitamin B12  1000 mcg  ROUTE: IM  SITE: Deltoid - Right  DOSE: 1 mL  LOT #: [id number]  :[employer] [place]   EXPIRATION DATE:  1/23  NDC#: 6158-1484-88

## 2022-02-16 ENCOUNTER — ALLIED HEALTH/NURSE VISIT (OUTPATIENT)
Dept: FAMILY MEDICINE | Facility: CLINIC | Age: 87
End: 2022-02-16

## 2022-02-16 DIAGNOSIS — E53.9 B-COMPLEX DEFICIENCY: Primary | ICD-10-CM

## 2022-02-16 PROCEDURE — 96372 THER/PROPH/DIAG INJ SC/IM: CPT | Performed by: FAMILY MEDICINE

## 2022-02-16 RX ADMIN — CYANOCOBALAMIN 1000 MCG: 1000 INJECTION, SOLUTION INTRAMUSCULAR; SUBCUTANEOUS at 15:33

## 2022-02-16 NOTE — NURSING NOTE
Chief Complaint   Patient presents with     Imm/Inj     vitamin b12 injection     The following medication was given:     MEDICATION: Vitamin B12  1000 mcg  ROUTE: IM  SITE: Deltoid - Left  DOSE: 1000 mcg  LOT #: [id number]  :  [employer] [place]  EXPIRATION DATE:  1/1/2023  NDC#: 9860-3381-19    Given by Haleigh Thrasher CMA

## 2022-02-24 ENCOUNTER — TRANSFERRED RECORDS (OUTPATIENT)
Dept: FAMILY MEDICINE | Facility: CLINIC | Age: 87
End: 2022-02-24

## 2022-03-16 ENCOUNTER — OFFICE VISIT (OUTPATIENT)
Dept: FAMILY MEDICINE | Facility: CLINIC | Age: 87
End: 2022-03-16

## 2022-03-16 VITALS
WEIGHT: 148 LBS | DIASTOLIC BLOOD PRESSURE: 60 MMHG | HEART RATE: 60 BPM | TEMPERATURE: 97.6 F | RESPIRATION RATE: 20 BRPM | OXYGEN SATURATION: 97 % | BODY MASS INDEX: 27.74 KG/M2 | SYSTOLIC BLOOD PRESSURE: 110 MMHG

## 2022-03-16 DIAGNOSIS — G62.9 PERIPHERAL POLYNEUROPATHY: ICD-10-CM

## 2022-03-16 DIAGNOSIS — E53.9 B-COMPLEX DEFICIENCY: ICD-10-CM

## 2022-03-16 DIAGNOSIS — E78.2 MIXED HYPERLIPIDEMIA: ICD-10-CM

## 2022-03-16 DIAGNOSIS — F41.1 GAD (GENERALIZED ANXIETY DISORDER): ICD-10-CM

## 2022-03-16 DIAGNOSIS — I10 ESSENTIAL HYPERTENSION, BENIGN: ICD-10-CM

## 2022-03-16 DIAGNOSIS — E11.40 TYPE 2 DIABETES MELLITUS WITH DIABETIC NEUROPATHY, WITHOUT LONG-TERM CURRENT USE OF INSULIN (H): Primary | ICD-10-CM

## 2022-03-16 DIAGNOSIS — E03.9 HYPOTHYROIDISM, UNSPECIFIED TYPE: ICD-10-CM

## 2022-03-16 LAB
ALBUMIN SERPL-MCNC: 3.8 G/DL (ref 3.6–5.1)
ALBUMIN/GLOB SERPL: 1.6 {RATIO} (ref 1–2.5)
ALP SERPL-CCNC: 62 U/L (ref 33–130)
ALT 1742-6: 7 U/L (ref 0–32)
AST 1920-8: 12 U/L (ref 0–35)
BILIRUB SERPL-MCNC: 0.4 MG/DL (ref 0.2–1.2)
BUN SERPL-MCNC: 21 MG/DL (ref 7–25)
BUN/CREATININE RATIO: 25.9 (ref 6–22)
CALCIUM SERPL-MCNC: 9.5 MG/DL (ref 8.6–10.3)
CHLORIDE SERPLBLD-SCNC: 103.9 MMOL/L (ref 98–110)
CO2 SERPL-SCNC: 27.8 MMOL/L (ref 20–32)
CREAT SERPL-MCNC: 0.81 MG/DL (ref 0.6–1.3)
GLOBULIN, CALCULATED - QUEST: 2.4 (ref 1.9–3.7)
GLUCOSE SERPL-MCNC: 195 MG/DL (ref 60–99)
HBA1C MFR BLD: 6.1 % (ref 4–7)
POTASSIUM SERPL-SCNC: 4.37 MMOL/L (ref 3.5–5.3)
PROT SERPL-MCNC: 6.2 G/DL (ref 6.1–8.1)
SODIUM SERPL-SCNC: 137.6 MMOL/L (ref 135–146)

## 2022-03-16 PROCEDURE — 36415 COLL VENOUS BLD VENIPUNCTURE: CPT | Performed by: FAMILY MEDICINE

## 2022-03-16 PROCEDURE — 96372 THER/PROPH/DIAG INJ SC/IM: CPT | Performed by: FAMILY MEDICINE

## 2022-03-16 PROCEDURE — 99214 OFFICE O/P EST MOD 30 MIN: CPT | Mod: 25 | Performed by: FAMILY MEDICINE

## 2022-03-16 PROCEDURE — 83036 HEMOGLOBIN GLYCOSYLATED A1C: CPT | Performed by: FAMILY MEDICINE

## 2022-03-16 PROCEDURE — 80053 COMPREHEN METABOLIC PANEL: CPT | Performed by: FAMILY MEDICINE

## 2022-03-16 RX ORDER — CITALOPRAM HYDROBROMIDE 20 MG/1
20 TABLET ORAL DAILY
Qty: 90 TABLET | Refills: 1 | Status: SHIPPED | OUTPATIENT
Start: 2022-03-16 | End: 2022-08-31

## 2022-03-16 RX ORDER — LISINOPRIL 20 MG/1
20 TABLET ORAL DAILY
Qty: 90 TABLET | Refills: 1 | Status: SHIPPED | OUTPATIENT
Start: 2022-03-16 | End: 2022-08-31

## 2022-03-16 RX ORDER — SIMVASTATIN 20 MG
TABLET ORAL
Qty: 90 TABLET | Refills: 1 | Status: SHIPPED | OUTPATIENT
Start: 2022-03-16 | End: 2022-08-31

## 2022-03-16 RX ORDER — CYANOCOBALAMIN 1000 UG/ML
1000 INJECTION, SOLUTION INTRAMUSCULAR; SUBCUTANEOUS
Status: ACTIVE | OUTPATIENT
Start: 2022-03-16 | End: 2023-03-11

## 2022-03-16 RX ORDER — GABAPENTIN 300 MG/1
300 CAPSULE ORAL EVERY EVENING
Qty: 90 CAPSULE | Refills: 1 | Status: SHIPPED | OUTPATIENT
Start: 2022-03-16 | End: 2022-08-31

## 2022-03-16 RX ORDER — PREGABALIN 150 MG/1
150 CAPSULE ORAL 3 TIMES DAILY
Qty: 270 CAPSULE | Refills: 1 | Status: SHIPPED | OUTPATIENT
Start: 2022-03-16 | End: 2022-08-31

## 2022-03-16 RX ORDER — LEVOTHYROXINE SODIUM 112 UG/1
TABLET ORAL
Qty: 90 TABLET | Refills: 1 | Status: SHIPPED | OUTPATIENT
Start: 2022-03-16 | End: 2022-08-31

## 2022-03-16 RX ADMIN — CYANOCOBALAMIN 1000 MCG: 1000 INJECTION, SOLUTION INTRAMUSCULAR; SUBCUTANEOUS at 16:58

## 2022-03-16 NOTE — NURSING NOTE
Chief Complaint   Patient presents with     Recheck Medication     non fasting medication check and review      Imm/Inj     Vitamin B12 injection due      Pre-visit Screening:  Immunizations:  up to date  Colonoscopy:  is up to date  Mammogram: is up to date  Asthma Action Test/Plan:  NA  PHQ9:  Given today   GAD7:  Given today   Questioned patient about current smoking habits Pt. has never smoked.  Ok to leave detailed message on voice mail for today's visit only Yes, phone # [phone]

## 2022-03-16 NOTE — NURSING NOTE
The following medication was given:     MEDICATION: Vitamin B12  1000 mcg  ROUTE: IM  SITE: Deltoid - Right  DOSE: 1000 mcg   LOT #: [id number]  :  [employer] [place]  EXPIRATION DATE:  5/1/2023  NDC#: 4295-4261-19    Injection given by: Haleigh Thrasher CMA

## 2022-03-16 NOTE — PROGRESS NOTES
"  Assessment & Plan     Type 2 diabetes mellitus with diabetic neuropathy, without long-term current use of insulin (H)  Well controlled, continue current medications at current doses   - lisinopril (ZESTRIL) 20 MG tablet  Dispense: 90 tablet; Refill: 1  - metFORMIN (GLUCOPHAGE) 1000 MG tablet  Dispense: 180 tablet; Refill: 1  - simvastatin (ZOCOR) 20 MG tablet  Dispense: 90 tablet; Refill: 1  - FOOT EXAM  NO CHARGE [67106.114]  - HEMOGLOBIN A1C (BFP)  - VENOUS COLLECTION  - Comprehensive Metobolic Panel (BFP)    Mixed hyperlipidemia  Controlled, continue current medications at current doses   - simvastatin (ZOCOR) 20 MG tablet  Dispense: 90 tablet; Refill: 1  - Comprehensive Metobolic Panel (BFP)    Essential hypertension, benign  Well controlled, continue current medications at current doses   - lisinopril (ZESTRIL) 20 MG tablet  Dispense: 90 tablet; Refill: 1  - Comprehensive Metobolic Panel (BFP)    SONYA (generalized anxiety disorder)  Well controlled, continue current medications at current doses   - citalopram (CELEXA) 20 MG tablet  Dispense: 90 tablet; Refill: 1    Peripheral polyneuropathy  Stable, continue current medications at current doses   - gabapentin (NEURONTIN) 300 MG capsule  Dispense: 90 capsule; Refill: 1  - pregabalin (LYRICA) 150 MG capsule  Dispense: 270 capsule; Refill: 1    Hypothyroidism, unspecified type  stable symptomatically continue current medications at current doses   - levothyroxine (SYNTHROID/LEVOTHROID) 112 MCG tablet  Dispense: 90 tablet; Refill: 1    B-complex deficiency    - cyanocobalamin injection 1,000 mcg      Review of external notes as documented elsewhere in note  Review of the result(s) of each unique test - labs  Ordering of each unique test  Prescription drug management         BMI:   Estimated body mass index is 27.74 kg/m  as calculated from the following:    Height as of 9/20/21: 1.556 m (5' 1.25\").    Weight as of this encounter: 67.1 kg (148 lb).       FUTURE " APPOINTMENTS:       - Follow-up visit in 6 mo  Regular exercise    No follow-ups on file.    Berhane Stevens MD  [facility]    Subjective   Trisha Vaughn is a 86 year old who presents for the following health issues  accompanied by her daughter.    HPI     Diabetes Follow-up      How often are you checking your blood sugar? Not at all    What concerns do you have today about your diabetes? None     Do you have any of these symptoms? (Select all that apply)  Burning in feet              Hyperlipidemia Follow-Up      Are you regularly taking any medication or supplement to lower your cholesterol?   Yes- simvastatin    Are you having muscle aches or other side effects that you think could be caused by your cholesterol lowering medication?  No    Hypertension Follow-up      Do you check your blood pressure regularly outside of the clinic? Yes     Are you following a low salt diet? No    Are your blood pressures ever more than 140 on the top number (systolic) OR more   than 90 on the bottom number (diastolic), for example 140/90? No    BP Readings from Last 2 Encounters:   03/16/22 110/60   09/20/21 120/62     Hemoglobin A1C POCT (%)   Date Value   03/16/2022 6.1   09/20/2021 6.1     LDL Cholesterol Calculated (mg/dL (calc))   Date Value   01/11/2019 70   12/28/2017 76     LDL Cholesterol Direct (mg/dL)   Date Value   08/19/2020 69   01/07/2020 83       Anxiety Follow-Up    How are you doing with your anxiety since your last visit? No change    Are you having other symptoms that might be associated with anxiety? No    Have you had a significant life event? No     Are you feeling depressed? No    Do you have any concerns with your use of alcohol or other drugs? No    Social History     Tobacco Use     Smoking status: Never Smoker     Smokeless tobacco: Never Used   Substance Use Topics     Alcohol use: No     Alcohol/week: 0.0 standard drinks     Comment: 0     Drug use: No     SONYA-7 SCORE 1/11/2019  8/7/2019 9/20/2021   Total Score 3 3 1     PHQ 8/7/2019 1/7/2020 9/20/2021   PHQ-9 Total Score 6 4 10   Q9: Thoughts of better off dead/self-harm past 2 weeks Not at all Not at all Not at all     Last PHQ-9 9/20/2021   1.  Little interest or pleasure in doing things 2   2.  Feeling down, depressed, or hopeless 1   3.  Trouble falling or staying asleep, or sleeping too much 3   4.  Feeling tired or having little energy 2   5.  Poor appetite or overeating 2   6.  Feeling bad about yourself 0   7.  Trouble concentrating 0   8.  Moving slowly or restless 0   Q9: Thoughts of better off dead/self-harm past 2 weeks 0   PHQ-9 Total Score 10   Difficulty at work, home, or with people Somewhat difficult     SONYA-7  9/20/2021   1. Feeling nervous, anxious, or on edge 0   2. Not being able to stop or control worrying 0   3. Worrying too much about different things 0   4. Trouble relaxing 0   5. Being so restless that it is hard to sit still 0   6. Becoming easily annoyed or irritable 0   7. Feeling afraid, as if something awful might happen 1   SONYA-7 Total Score 1   If you checked any problems, how difficult have they made it for you to do your work, take care of things at home, or get along with other people? Not difficult at all       Hypothyroidism Follow-up      Since last visit, patient describes the following symptoms: Weight stable, no hair loss, no skin changes, no constipation, no loose stools    Pt saw Dr Angela neurology 2/24/22- neuopathy stable, had MRI last week- has not heard results, notes reviewed      How many servings of fruits and vegetables do you eat daily?  2-3    On average, how many sweetened beverages do you drink each day (Examples: soda, juice, sweet tea, etc.  Do NOT count diet or artificially sweetened beverages)?   1    How many days per week do you exercise enough to make your heart beat faster? 3 or less    How many minutes a day do you exercise enough to make your heart beat faster? 10 -  19    How many days per week do you miss taking your medication? 0        Review of Systems   Constitutional, HEENT, cardiovascular, pulmonary, gi and gu systems are negative, except as otherwise noted.      Objective    /60 (BP Location: Right arm, Patient Position: Sitting, Cuff Size: Adult Large)   Pulse 60   Temp 97.6  F (36.4  C) (Temporal)   Resp 20   Wt 67.1 kg (148 lb)   SpO2 97%   BMI 27.74 kg/m    Body mass index is 27.74 kg/m .  Physical Exam   GENERAL: healthy, alert and no distress  EYES: Eyes grossly normal to inspection, PERRL and conjunctivae and sclerae normal  HENT: ear canals and TM's normal, nose and mouth without ulcers or lesions  NECK: no adenopathy, no asymmetry, masses, or scars and thyroid normal to palpation  RESP: lungs clear to auscultation - no rales, rhonchi or wheezes  CV: regular rate and rhythm, normal S1 S2, no S3 or S4, no murmur, click or rub, no peripheral edema and peripheral pulses strong  ABDOMEN: soft, nontender, no hepatosplenomegaly, no masses and bowel sounds normal  MS: no gross musculoskeletal defects noted, no edema  SKIN: no suspicious lesions or rashes  PSYCH: mentation appears normal, affect normal/bright    Results for orders placed or performed in visit on 03/16/22 (from the past 24 hour(s))   HEMOGLOBIN A1C (BFP)   Result Value Ref Range    Hemoglobin A1C POCT 6.1 4.0 - 7.0 %

## 2022-04-13 ENCOUNTER — ALLIED HEALTH/NURSE VISIT (OUTPATIENT)
Dept: FAMILY MEDICINE | Facility: CLINIC | Age: 87
End: 2022-04-13

## 2022-04-13 DIAGNOSIS — E53.9 B-COMPLEX DEFICIENCY: Primary | ICD-10-CM

## 2022-04-13 PROCEDURE — 99212 OFFICE O/P EST SF 10 MIN: CPT | Performed by: FAMILY MEDICINE

## 2022-04-13 NOTE — NURSING NOTE
Chief Complaint   Patient presents with     Imm/Inj     Vitamin B12 injection       The following medication was given:     MEDICATION: Vitamin B12  1000 mcg  ROUTE: IM  SITE: Deltoid - Left  DOSE: 1000 mcg  LOT #: [id number]  :  [employer] [place]  EXPIRATION DATE:  5/1/2023  NDC#: 7867-5203-43    Injection done by Haleigh Thrasher CMA

## 2022-04-18 ENCOUNTER — TRANSFERRED RECORDS (OUTPATIENT)
Dept: FAMILY MEDICINE | Facility: CLINIC | Age: 87
End: 2022-04-18

## 2022-05-05 NOTE — NURSING NOTE
Trisha Vaughn is here for a medication recheck- Citapolpram. Pt would like to go back onto a full strength of the 20 mg's instead of the 1/2 tablet that she has been taking for anxiety.     Pre-Visit Screening :  Immunizations : up to date    Colonoscopy : NOt needed  Mammogram : Not needed  Asthma Action Test/Plan : NA  PHQ9/GAD7 :  Done today  Pulse - regular  My Chart - accepts    CLASSIFICATION OF OVERWEIGHT AND OBESITY BY BMI                         Obesity Class           BMI(kg/m2)  Underweight                                    < 18.5  Normal                                         18.5-24.9  Overweight                                     25.0-29.9  OBESITY                     I                  30.0-34.9                              II                 35.0-39.9  EXTREME OBESITY             III                >40                             Patient's  BMI Body mass index is 33.57 kg/(m^2).  http://hin.nhlbi.nih.gov/menuplanner/menu.cgi  Questioned patient about current smoking habits.  Pt. has never smoked.    Jeri.GISSELLE Medina ([facility])     Things to follow up:  -PCP follow up in 1-2 weeks  -Recommended quarantine at home for 7-10 days at home  -Take decadron steroid daily for 10 days  -Take albuterol only as needed for shortness of breath and wheezing  -Follow up with Dr. Cooper as needed

## 2022-05-11 DIAGNOSIS — E53.9 B-COMPLEX DEFICIENCY: Primary | ICD-10-CM

## 2022-05-11 PROCEDURE — 96372 THER/PROPH/DIAG INJ SC/IM: CPT | Performed by: FAMILY MEDICINE

## 2022-05-11 RX ADMIN — CYANOCOBALAMIN 1000 MCG: 1000 INJECTION, SOLUTION INTRAMUSCULAR; SUBCUTANEOUS at 15:54

## 2022-05-11 NOTE — NURSING NOTE
Chief Complaint   Patient presents with     Imm/Inj     Vitamin b12 injection       The following medication was given:     MEDICATION: Vitamin B12  1000 mcg  ROUTE: IM  SITE: Deltoid - Right  DOSE: 1000 mcg  LOT #: [id number]  :  American [place]  EXPIRATION DATE:  5/1/23  NDC#: 1577-3810-35

## 2022-06-04 ENCOUNTER — HEALTH MAINTENANCE LETTER (OUTPATIENT)
Age: 87
End: 2022-06-04

## 2022-06-08 ENCOUNTER — ALLIED HEALTH/NURSE VISIT (OUTPATIENT)
Dept: FAMILY MEDICINE | Facility: CLINIC | Age: 87
End: 2022-06-08

## 2022-06-08 DIAGNOSIS — E53.9 B-COMPLEX DEFICIENCY: Primary | ICD-10-CM

## 2022-06-08 PROCEDURE — 96372 THER/PROPH/DIAG INJ SC/IM: CPT | Performed by: FAMILY MEDICINE

## 2022-06-08 RX ADMIN — CYANOCOBALAMIN 1000 MCG: 1000 INJECTION, SOLUTION INTRAMUSCULAR; SUBCUTANEOUS at 15:40

## 2022-06-08 NOTE — NURSING NOTE
Chief Complaint   Patient presents with     Imm/Inj     Vitamin b12     The following medication was given:     MEDICATION: Vitamin B12  1000 mcg  ROUTE: IM  SITE: Deltoid - Left  DOSE: 1000 mcg   LOT #: [id number]  :  American [place]  EXPIRATION DATE:  5/1/23  NDC#: 2832-3293-23

## 2022-07-06 ENCOUNTER — OFFICE VISIT (OUTPATIENT)
Dept: FAMILY MEDICINE | Facility: CLINIC | Age: 87
End: 2022-07-06

## 2022-07-06 DIAGNOSIS — E53.9 B-COMPLEX DEFICIENCY: Primary | ICD-10-CM

## 2022-07-06 PROCEDURE — 96372 THER/PROPH/DIAG INJ SC/IM: CPT | Performed by: FAMILY MEDICINE

## 2022-07-06 RX ADMIN — CYANOCOBALAMIN 1000 MCG: 1000 INJECTION, SOLUTION INTRAMUSCULAR; SUBCUTANEOUS at 15:38

## 2022-07-06 NOTE — NURSING NOTE
The following medication was given:     MEDICATION: Vitamin B12  1000mcg  ROUTE: IM  SITE: Deltoid - Right  DOSE: 1ML  LOT #: [id number]  :  American [place]  EXPIRATION DATE:  05/01/23  NDC#: 8198-3669-57

## 2022-07-27 ENCOUNTER — TRANSFERRED RECORDS (OUTPATIENT)
Dept: FAMILY MEDICINE | Facility: CLINIC | Age: 87
End: 2022-07-27

## 2022-08-03 ENCOUNTER — ALLIED HEALTH/NURSE VISIT (OUTPATIENT)
Dept: FAMILY MEDICINE | Facility: CLINIC | Age: 87
End: 2022-08-03

## 2022-08-03 DIAGNOSIS — E53.9 B-COMPLEX DEFICIENCY: Primary | ICD-10-CM

## 2022-08-03 PROCEDURE — 96372 THER/PROPH/DIAG INJ SC/IM: CPT | Performed by: FAMILY MEDICINE

## 2022-08-03 RX ADMIN — CYANOCOBALAMIN 1000 MCG: 1000 INJECTION, SOLUTION INTRAMUSCULAR; SUBCUTANEOUS at 15:35

## 2022-08-03 NOTE — NURSING NOTE
Chief Complaint   Patient presents with     All[facility] Visit     B12 injection     The following medication was given:     MEDICATION: Vitamin B12  1000mcg  ROUTE: IM  SITE: Deltoid - Left  DOSE: 1ML  LOT #: [id number]  :  [employer] [place]  EXPIRATION DATE:  09/2023  NDC#: 1254-5997-70     Needs vitamin b12 checked at next office visit.

## 2022-08-31 ENCOUNTER — OFFICE VISIT (OUTPATIENT)
Dept: FAMILY MEDICINE | Facility: CLINIC | Age: 87
End: 2022-08-31

## 2022-08-31 VITALS
HEART RATE: 64 BPM | TEMPERATURE: 97.2 F | SYSTOLIC BLOOD PRESSURE: 98 MMHG | WEIGHT: 145 LBS | RESPIRATION RATE: 20 BRPM | HEIGHT: 61 IN | DIASTOLIC BLOOD PRESSURE: 56 MMHG | BODY MASS INDEX: 27.38 KG/M2

## 2022-08-31 DIAGNOSIS — M79.645 PAIN OF FINGER OF LEFT HAND: ICD-10-CM

## 2022-08-31 DIAGNOSIS — E03.9 HYPOTHYROIDISM, UNSPECIFIED TYPE: ICD-10-CM

## 2022-08-31 DIAGNOSIS — E11.40 TYPE 2 DIABETES MELLITUS WITH DIABETIC NEUROPATHY, WITHOUT LONG-TERM CURRENT USE OF INSULIN (H): Primary | ICD-10-CM

## 2022-08-31 DIAGNOSIS — I10 ESSENTIAL HYPERTENSION, BENIGN: ICD-10-CM

## 2022-08-31 DIAGNOSIS — E78.2 MIXED HYPERLIPIDEMIA: ICD-10-CM

## 2022-08-31 DIAGNOSIS — F41.1 GAD (GENERALIZED ANXIETY DISORDER): ICD-10-CM

## 2022-08-31 DIAGNOSIS — G62.9 PERIPHERAL POLYNEUROPATHY: ICD-10-CM

## 2022-08-31 DIAGNOSIS — E53.9 B-COMPLEX DEFICIENCY: ICD-10-CM

## 2022-08-31 LAB
ALBUMIN (URINE) MG/L: 30
ALBUMIN SERPL-MCNC: 3.8 G/DL (ref 3.6–5.1)
ALBUMIN URINE MG/G CR: <30 MG/G CREATININE
ALBUMIN/GLOB SERPL: 1.7 {RATIO} (ref 1–2.5)
ALP SERPL-CCNC: 64 U/L (ref 33–130)
ALT 1742-6: 6 U/L (ref 0–32)
AST 1920-8: 16 U/L (ref 0–35)
BILIRUB SERPL-MCNC: 0.5 MG/DL (ref 0.2–1.2)
BUN SERPL-MCNC: 18 MG/DL (ref 7–25)
BUN/CREATININE RATIO: 19.6 (ref 6–22)
CALCIUM SERPL-MCNC: 8.9 MG/DL (ref 8.6–10.3)
CHLORIDE SERPLBLD-SCNC: 107.4 MMOL/L (ref 98–110)
CO2 SERPL-SCNC: 25.2 MMOL/L (ref 20–32)
CREAT SERPL-MCNC: 0.92 MG/DL (ref 0.6–1.3)
CREATININE URINE MG/DL: 300 MG/DL
GLOBULIN, CALCULATED - QUEST: 2.2 (ref 1.9–3.7)
GLUCOSE SERPL-MCNC: 193 MG/DL (ref 60–99)
HBA1C MFR BLD: 5.9 % (ref 4–7)
POTASSIUM SERPL-SCNC: 4.39 MMOL/L (ref 3.5–5.3)
PROT SERPL-MCNC: 6 G/DL (ref 6.1–8.1)
SODIUM SERPL-SCNC: 140.7 MMOL/L (ref 135–146)

## 2022-08-31 PROCEDURE — 82570 ASSAY OF URINE CREATININE: CPT | Performed by: FAMILY MEDICINE

## 2022-08-31 PROCEDURE — 96372 THER/PROPH/DIAG INJ SC/IM: CPT | Performed by: FAMILY MEDICINE

## 2022-08-31 PROCEDURE — 73140 X-RAY EXAM OF FINGER(S): CPT | Mod: LT | Performed by: FAMILY MEDICINE

## 2022-08-31 PROCEDURE — 82043 UR ALBUMIN QUANTITATIVE: CPT | Performed by: FAMILY MEDICINE

## 2022-08-31 PROCEDURE — 99214 OFFICE O/P EST MOD 30 MIN: CPT | Mod: 25 | Performed by: FAMILY MEDICINE

## 2022-08-31 PROCEDURE — 83036 HEMOGLOBIN GLYCOSYLATED A1C: CPT | Performed by: FAMILY MEDICINE

## 2022-08-31 PROCEDURE — 80053 COMPREHEN METABOLIC PANEL: CPT | Performed by: FAMILY MEDICINE

## 2022-08-31 PROCEDURE — 36415 COLL VENOUS BLD VENIPUNCTURE: CPT | Performed by: FAMILY MEDICINE

## 2022-08-31 RX ORDER — PREGABALIN 150 MG/1
150 CAPSULE ORAL 3 TIMES DAILY
Qty: 270 CAPSULE | Refills: 1 | Status: SHIPPED | OUTPATIENT
Start: 2022-08-31 | End: 2023-02-14 | Stop reason: DRUGHIGH

## 2022-08-31 RX ORDER — SIMVASTATIN 20 MG
TABLET ORAL
Qty: 90 TABLET | Refills: 1 | Status: SHIPPED | OUTPATIENT
Start: 2022-08-31 | End: 2023-09-20

## 2022-08-31 RX ORDER — GABAPENTIN 300 MG/1
300 CAPSULE ORAL EVERY EVENING
Qty: 90 CAPSULE | Refills: 1 | Status: SHIPPED | OUTPATIENT
Start: 2022-08-31 | End: 2023-02-14

## 2022-08-31 RX ORDER — CITALOPRAM HYDROBROMIDE 20 MG/1
20 TABLET ORAL DAILY
Qty: 90 TABLET | Refills: 1 | Status: SHIPPED | OUTPATIENT
Start: 2022-08-31 | End: 2023-02-14

## 2022-08-31 RX ORDER — LEVOTHYROXINE SODIUM 112 UG/1
TABLET ORAL
Qty: 90 TABLET | Refills: 1 | Status: SHIPPED | OUTPATIENT
Start: 2022-08-31 | End: 2023-02-14

## 2022-08-31 RX ORDER — LISINOPRIL 20 MG/1
20 TABLET ORAL DAILY
Qty: 90 TABLET | Refills: 1 | Status: ON HOLD | OUTPATIENT
Start: 2022-08-31 | End: 2023-01-10

## 2022-08-31 RX ADMIN — CYANOCOBALAMIN 1000 MCG: 1000 INJECTION, SOLUTION INTRAMUSCULAR; SUBCUTANEOUS at 15:54

## 2022-08-31 NOTE — PROGRESS NOTES
Assessment & Plan     Type 2 diabetes mellitus with diabetic neuropathy, without long-term current use of insulin (H)  Well controlled, continue current medications at current doses , can decrease metformin if under 5.8 A1C  - ALBUMIN RANDOM URINE QUANTITATIVE (BFP)  - HEMOGLOBIN A1C (BFP)  - VENOUS COLLECTION    Mixed hyperlipidemia  Controlled, continue current medications at current doses     Essential hypertension, benign  Well controlled, continue current medications at current doses     SONYA (generalized anxiety disorder)  Well ocntrolled, continue current medications at current doses     Peripheral polyneuropathy  Stable, continue current medications at current doses     Hypothyroidism, unspecified type  stable symptomatically continue current medications at current doses pending labs    B-complex deficiency      Pain of finger of left hand  No fx seen but significant DJD,   Will await radiology over-read and contact patient if any discrepancies       Review of the result(s) of each unique test - labs  Ordering of each unique test  Prescription drug management         FUTURE APPOINTMENTS:       - Follow-up visit in 6 mo  Regular exercise    No follow-ups on file.    Berhane Stevens MD  [facility]    Subjective   Trisha Vaughn is a 87 year old accompanied by her daughter, presenting for the following health issues:  Recheck Medication and UTI      HPI     Diabetes Follow-up      How often are you checking your blood sugar? Not at all    What concerns do you have today about your diabetes? None     Do you have any of these symptoms? (Select all that apply)  No numbness or tingling in feet.  No redness, sores or blisters on feet.  No complaints of excessive thirst.  No reports of blurry vision.  No significant changes to weight.              Hyperlipidemia Follow-Up      Are you regularly taking any medication or supplement to lower your cholesterol?   Yes- simvastatin    Are you having  muscle aches or other side effects that you think could be caused by your cholesterol lowering medication?  No    Hypertension Follow-up      Do you check your blood pressure regularly outside of the clinic? Yes     Are you following a low salt diet? No    Are your blood pressures ever more than 140 on the top number (systolic) OR more   than 90 on the bottom number (diastolic), for example 140/90? No    BP Readings from Last 2 Encounters:   08/31/22 98/56   03/16/22 110/60     Hemoglobin A1C (%)   Date Value   03/16/2022 6.1   09/20/2021 6.1     LDL Cholesterol Calculated (mg/dL (calc))   Date Value   01/11/2019 70   12/28/2017 76     LDL Cholesterol Direct (mg/dL)   Date Value   08/19/2020 69   01/07/2020 83       Anxiety Follow-Up    How are you doing with your anxiety since your last visit? No change    Are you having other symptoms that might be associated with anxiety? No    Have you had a significant life event? No     Are you feeling depressed? No    Do you have any concerns with your use of alcohol or other drugs? No    Social History     Tobacco Use     Smoking status: Never Smoker     Smokeless tobacco: Never Used   Substance Use Topics     Alcohol use: No     Alcohol/week: 0.0 standard drinks     Comment: 0     Drug use: No     SONYA-7 SCORE 1/11/2019 8/7/2019 9/20/2021   Total Score 3 3 1     PHQ 8/7/2019 1/7/2020 9/20/2021   PHQ-9 Total Score 6 4 10   Q9: Thoughts of better off dead/self-harm past 2 weeks Not at all Not at all Not at all     Last PHQ-9 9/20/2021   1.  Little interest or pleasure in doing things 2   2.  Feeling down, depressed, or hopeless 1   3.  Trouble falling or staying asleep, or sleeping too much 3   4.  Feeling tired or having little energy 2   5.  Poor appetite or overeating 2   6.  Feeling bad about yourself 0   7.  Trouble concentrating 0   8.  Moving slowly or restless 0   Q9: Thoughts of better off dead/self-harm past 2 weeks 0   PHQ-9 Total Score 10   Difficulty at work,  "home, or with people Somewhat difficult     SONYA-7  9/20/2021   1. Feeling nervous, anxious, or on edge 0   2. Not being able to stop or control worrying 0   3. Worrying too much about different things 0   4. Trouble relaxing 0   5. Being so restless that it is hard to sit still 0   6. Becoming easily annoyed or irritable 0   7. Feeling afraid, as if something awful might happen 1   SONYA-7 Total Score 1   If you checked any problems, how difficult have they made it for you to do your work, take care of things at home, or get along with other people? Not difficult at all       Hypothyroidism Follow-up      Since last visit, patient describes the following symptoms: Weight stable, no hair loss, no skin changes, no constipation, no loose stools      How many servings of fruits and vegetables do you eat daily?  2-3    On average, how many sweetened beverages do you drink each day (Examples: soda, juice, sweet tea, etc.  Do NOT count diet or artificially sweetened beverages)?   1    How many days per week do you exercise enough to make your heart beat faster? 3 or less    How many minutes a day do you exercise enough to make your heart beat faster? 9 or less    How many days per week do you miss taking your medication? 0    Pt fell at cabin a week ago, states left index finger sore-was swollen, hurts to bend still    Review of Systems   Constitutional, HEENT, cardiovascular, pulmonary, gi and gu systems are negative, except as otherwise noted.      Objective    BP 98/56 (BP Location: Right arm, Patient Position: Chair, Cuff Size: Adult Regular)   Pulse 64   Temp 97.2  F (36.2  C) (Temporal)   Resp 20   Ht 1.556 m (5' 1.25\")   Wt 65.8 kg (145 lb)   BMI 27.17 kg/m    Body mass index is 27.17 kg/m .  Physical Exam   GENERAL: healthy, alert and no distress  EYES: Eyes grossly normal to inspection, PERRL and conjunctivae and sclerae normal  HENT: ear canals and TM's normal, nose and mouth without ulcers or lesions  NECK: " no adenopathy, no asymmetry, masses, or scars and thyroid normal to palpation  RESP: lungs clear to auscultation - no rales, rhonchi or wheezes  CV: regular rate and rhythm, normal S1 S2, no S3 or S4, no murmur, click or rub, no peripheral edema and peripheral pulses strong  ABDOMEN: soft, nontender, no hepatosplenomegaly, no masses and bowel sounds normal  MS: no gross musculoskeletal defects noted, no edema  MS: left index tender PIP, slightly swollen, decreased ROM  SKIN: slight facial bruising left face  PSYCH: mentation appears normal, affect normal/bright    Results for orders placed or performed in visit on 08/31/22 (from the past 24 hour(s))   ALBUMIN RANDOM URINE QUANTITATIVE (BFP)   Result Value Ref Range    Albumin mg/L 30 30    Creatinine Urine mg/dL 300 300 mg/dL    Albumin Urine mg/g Cr <30 30 MG/G Creatinine   HEMOGLOBIN A1C (BFP)   Result Value Ref Range    Hemoglobin A1C 5.9 4.0 - 7.0 %                   .  ..

## 2022-08-31 NOTE — NURSING NOTE
Trisha COPE Rea is here for a medication check. She had a fall 2 weeks ago and has a sore left finger since.    Questioned patient about current smoking habits.  Pt. has never smoked.  PULSE regular  My Chart: active  CLASSIFICATION OF OVERWEIGHT AND OBESITY BY BMI                        Obesity Class           BMI(kg/m2)  Underweight                                    < 18.5  Normal                                         18.5-24.9  Overweight                                     25.0-29.9  OBESITY                     I                  30.0-34.9                             II                 35.0-39.9  EXTREME OBESITY             III                >40                            Patient's  BMI Body mass index is 27.17 kg/m .  http://hin.nhlbi.nih.gov/menuplanner/menu.cgi  Pre-visit planning  Immunizations - up to date  Colonoscopy -   Mammogram -   Asthma -   PHQ9 -    SONYA-7 -

## 2022-09-01 LAB
T4, FREE, NON-DIALYSIS - QUEST: 1.3 NG/DL (ref 0.8–1.8)
TSH SERPL-ACNC: 0.26 MIU/L (ref 0.4–4.5)
VIT B12 SERPL-MCNC: 491 PG/ML (ref 200–1100)

## 2022-09-01 RX ORDER — CYANOCOBALAMIN 1000 UG/ML
1000 INJECTION, SOLUTION INTRAMUSCULAR; SUBCUTANEOUS
Status: DISCONTINUED | OUTPATIENT
Start: 2022-09-01 | End: 2023-01-10

## 2022-10-05 ENCOUNTER — ALLIED HEALTH/NURSE VISIT (OUTPATIENT)
Dept: FAMILY MEDICINE | Facility: CLINIC | Age: 87
End: 2022-10-05

## 2022-10-05 DIAGNOSIS — E53.9 B-COMPLEX DEFICIENCY: Primary | ICD-10-CM

## 2022-10-05 PROCEDURE — 96372 THER/PROPH/DIAG INJ SC/IM: CPT | Performed by: FAMILY MEDICINE

## 2022-10-05 RX ADMIN — CYANOCOBALAMIN 1000 MCG: 1000 INJECTION, SOLUTION INTRAMUSCULAR; SUBCUTANEOUS at 15:46

## 2022-10-05 NOTE — NURSING NOTE
Chief Complaint   Patient presents with     Imm/Inj     Vitamin B12 injection      The following medication was given:     MEDICATION: Vitamin B12  1000 mcg  ROUTE: IM  SITE: Deltoid - Right  DOSE: 1000 mcg  LOT #: [id number]  :  American [place]  EXPIRATION DATE:  9/1/23  NDC#: 6965-8055-39

## 2022-11-02 ENCOUNTER — ALLIED HEALTH/NURSE VISIT (OUTPATIENT)
Dept: FAMILY MEDICINE | Facility: CLINIC | Age: 87
End: 2022-11-02

## 2022-11-02 DIAGNOSIS — E53.9 B-COMPLEX DEFICIENCY: Primary | ICD-10-CM

## 2022-11-02 PROCEDURE — 96372 THER/PROPH/DIAG INJ SC/IM: CPT | Performed by: FAMILY MEDICINE

## 2022-11-02 RX ADMIN — CYANOCOBALAMIN 1000 MCG: 1000 INJECTION, SOLUTION INTRAMUSCULAR; SUBCUTANEOUS at 15:36

## 2022-11-30 ENCOUNTER — ALLIED HEALTH/NURSE VISIT (OUTPATIENT)
Dept: FAMILY MEDICINE | Facility: CLINIC | Age: 87
End: 2022-11-30

## 2022-11-30 DIAGNOSIS — E53.9 B-COMPLEX DEFICIENCY: Primary | ICD-10-CM

## 2022-11-30 PROCEDURE — 96372 THER/PROPH/DIAG INJ SC/IM: CPT | Performed by: FAMILY MEDICINE

## 2022-11-30 RX ADMIN — CYANOCOBALAMIN 1000 MCG: 1000 INJECTION, SOLUTION INTRAMUSCULAR; SUBCUTANEOUS at 15:16

## 2022-12-28 ENCOUNTER — ALLIED HEALTH/NURSE VISIT (OUTPATIENT)
Dept: FAMILY MEDICINE | Facility: CLINIC | Age: 87
End: 2022-12-28

## 2022-12-28 DIAGNOSIS — E53.9 B-COMPLEX DEFICIENCY: Primary | ICD-10-CM

## 2022-12-28 PROCEDURE — 96372 THER/PROPH/DIAG INJ SC/IM: CPT | Performed by: FAMILY MEDICINE

## 2022-12-28 RX ADMIN — CYANOCOBALAMIN 1000 MCG: 1000 INJECTION, SOLUTION INTRAMUSCULAR; SUBCUTANEOUS at 15:48

## 2022-12-28 NOTE — NURSING NOTE
Chief Complaint   Patient presents with     Imm/Inj     Vitamin b12 injection      The following medication was given:     MEDICATION: Vitamin B12  1000 mcg  ROUTE: IM  SITE: Deltoid - Right  DOSE: 1000 mcg   LOT #: [id number]  :  [employer] [place]  EXPIRATION DATE: 5/1/24  NDC#: 2887-7198-28

## 2023-01-06 ENCOUNTER — APPOINTMENT (OUTPATIENT)
Dept: GENERAL RADIOLOGY | Facility: CLINIC | Age: 88
DRG: 871 | End: 2023-01-06
Attending: EMERGENCY MEDICINE
Payer: COMMERCIAL

## 2023-01-06 ENCOUNTER — HOSPITAL ENCOUNTER (INPATIENT)
Facility: CLINIC | Age: 88
LOS: 3 days | Discharge: NURSING, REHAB OR LONG-TERM CARE | DRG: 871 | End: 2023-01-10
Attending: EMERGENCY MEDICINE | Admitting: INTERNAL MEDICINE
Payer: COMMERCIAL

## 2023-01-06 ENCOUNTER — APPOINTMENT (OUTPATIENT)
Dept: CT IMAGING | Facility: CLINIC | Age: 88
DRG: 871 | End: 2023-01-06
Attending: EMERGENCY MEDICINE
Payer: COMMERCIAL

## 2023-01-06 DIAGNOSIS — E87.20 LACTIC ACIDEMIA: ICD-10-CM

## 2023-01-06 DIAGNOSIS — A41.9 SEPTIC SHOCK (H): ICD-10-CM

## 2023-01-06 DIAGNOSIS — G93.49 INFECTIOUS ENCEPHALOPATHY: ICD-10-CM

## 2023-01-06 DIAGNOSIS — Z76.89 HEALTH CARE HOME: Primary | ICD-10-CM

## 2023-01-06 DIAGNOSIS — B99.9 INFECTIOUS ENCEPHALOPATHY: ICD-10-CM

## 2023-01-06 DIAGNOSIS — D51.0 PERNICIOUS ANEMIA: ICD-10-CM

## 2023-01-06 DIAGNOSIS — J18.9 PNEUMONIA OF BOTH LOWER LOBES DUE TO INFECTIOUS ORGANISM: ICD-10-CM

## 2023-01-06 DIAGNOSIS — R65.21 SEPTIC SHOCK (H): ICD-10-CM

## 2023-01-06 LAB
ALBUMIN SERPL BCG-MCNC: 3.7 G/DL (ref 3.5–5.2)
ALP SERPL-CCNC: 115 U/L (ref 35–104)
ALT SERPL W P-5'-P-CCNC: 21 U/L (ref 10–35)
ANION GAP SERPL CALCULATED.3IONS-SCNC: 17 MMOL/L (ref 7–15)
AST SERPL W P-5'-P-CCNC: 32 U/L (ref 10–35)
BILIRUB DIRECT SERPL-MCNC: <0.2 MG/DL (ref 0–0.3)
BILIRUB SERPL-MCNC: 0.6 MG/DL
BUN SERPL-MCNC: 26 MG/DL (ref 8–23)
CALCIUM SERPL-MCNC: 9.6 MG/DL (ref 8.8–10.2)
CHLORIDE SERPL-SCNC: 98 MMOL/L (ref 98–107)
CREAT SERPL-MCNC: 1.13 MG/DL (ref 0.51–0.95)
DEPRECATED HCO3 PLAS-SCNC: 21 MMOL/L (ref 22–29)
ERYTHROCYTE [DISTWIDTH] IN BLOOD BY AUTOMATED COUNT: 13.8 % (ref 10–15)
FLUAV RNA SPEC QL NAA+PROBE: NEGATIVE
FLUBV RNA RESP QL NAA+PROBE: NEGATIVE
GFR SERPL CREATININE-BSD FRML MDRD: 47 ML/MIN/1.73M2
GLUCOSE SERPL-MCNC: 327 MG/DL (ref 70–99)
HCO3 BLDV-SCNC: 20 MMOL/L (ref 21–28)
HCT VFR BLD AUTO: 39.8 % (ref 35–47)
HGB BLD-MCNC: 12.4 G/DL (ref 11.7–15.7)
HOLD SPECIMEN: NORMAL
HOLD SPECIMEN: NORMAL
LACTATE BLD-SCNC: 6.5 MMOL/L
MCH RBC QN AUTO: 29.6 PG (ref 26.5–33)
MCHC RBC AUTO-ENTMCNC: 31.2 G/DL (ref 31.5–36.5)
MCV RBC AUTO: 95 FL (ref 78–100)
PCO2 BLDV: 54 MM HG (ref 40–50)
PH BLDV: 7.18 [PH] (ref 7.32–7.43)
PLATELET # BLD AUTO: 271 10E3/UL (ref 150–450)
PO2 BLDV: 23 MM HG (ref 25–47)
POTASSIUM SERPL-SCNC: 4.3 MMOL/L (ref 3.4–5.3)
PROT SERPL-MCNC: 7.2 G/DL (ref 6.4–8.3)
RBC # BLD AUTO: 4.19 10E6/UL (ref 3.8–5.2)
RSV RNA SPEC NAA+PROBE: NEGATIVE
SAO2 % BLDV: 27 % (ref 94–100)
SARS-COV-2 RNA RESP QL NAA+PROBE: NEGATIVE
SODIUM SERPL-SCNC: 136 MMOL/L (ref 136–145)
TROPONIN T SERPL HS-MCNC: 29 NG/L
WBC # BLD AUTO: 17.6 10E3/UL (ref 4–11)

## 2023-01-06 PROCEDURE — 99291 CRITICAL CARE FIRST HOUR: CPT | Mod: CS,25

## 2023-01-06 PROCEDURE — 258N000003 HC RX IP 258 OP 636: Performed by: EMERGENCY MEDICINE

## 2023-01-06 PROCEDURE — 80053 COMPREHEN METABOLIC PANEL: CPT | Performed by: EMERGENCY MEDICINE

## 2023-01-06 PROCEDURE — 250N000011 HC RX IP 250 OP 636: Performed by: EMERGENCY MEDICINE

## 2023-01-06 PROCEDURE — 84484 ASSAY OF TROPONIN QUANT: CPT | Performed by: EMERGENCY MEDICINE

## 2023-01-06 PROCEDURE — 99292 CRITICAL CARE ADDL 30 MIN: CPT | Mod: CS,25

## 2023-01-06 PROCEDURE — 250N000011 HC RX IP 250 OP 636

## 2023-01-06 PROCEDURE — 85027 COMPLETE CBC AUTOMATED: CPT | Performed by: EMERGENCY MEDICINE

## 2023-01-06 PROCEDURE — 70450 CT HEAD/BRAIN W/O DYE: CPT

## 2023-01-06 PROCEDURE — 36415 COLL VENOUS BLD VENIPUNCTURE: CPT | Performed by: EMERGENCY MEDICINE

## 2023-01-06 PROCEDURE — 82550 ASSAY OF CK (CPK): CPT | Performed by: INTERNAL MEDICINE

## 2023-01-06 PROCEDURE — 96361 HYDRATE IV INFUSION ADD-ON: CPT

## 2023-01-06 PROCEDURE — 96375 TX/PRO/DX INJ NEW DRUG ADDON: CPT

## 2023-01-06 PROCEDURE — 71046 X-RAY EXAM CHEST 2 VIEWS: CPT

## 2023-01-06 PROCEDURE — C9803 HOPD COVID-19 SPEC COLLECT: HCPCS

## 2023-01-06 PROCEDURE — 82803 BLOOD GASES ANY COMBINATION: CPT

## 2023-01-06 PROCEDURE — 96365 THER/PROPH/DIAG IV INF INIT: CPT | Mod: 59

## 2023-01-06 PROCEDURE — 82248 BILIRUBIN DIRECT: CPT | Performed by: EMERGENCY MEDICINE

## 2023-01-06 PROCEDURE — 87637 SARSCOV2&INF A&B&RSV AMP PRB: CPT | Performed by: EMERGENCY MEDICINE

## 2023-01-06 PROCEDURE — 93005 ELECTROCARDIOGRAM TRACING: CPT

## 2023-01-06 RX ORDER — LEVOFLOXACIN 5 MG/ML
750 INJECTION, SOLUTION INTRAVENOUS ONCE
Status: COMPLETED | OUTPATIENT
Start: 2023-01-06 | End: 2023-01-07

## 2023-01-06 RX ORDER — ONDANSETRON 2 MG/ML
INJECTION INTRAMUSCULAR; INTRAVENOUS
Status: COMPLETED
Start: 2023-01-06 | End: 2023-01-06

## 2023-01-06 RX ORDER — ONDANSETRON 2 MG/ML
4 INJECTION INTRAMUSCULAR; INTRAVENOUS ONCE
Status: COMPLETED | OUTPATIENT
Start: 2023-01-06 | End: 2023-01-06

## 2023-01-06 RX ADMIN — SODIUM CHLORIDE 1000 ML: 9 INJECTION, SOLUTION INTRAVENOUS at 22:12

## 2023-01-06 RX ADMIN — ONDANSETRON 4 MG: 2 INJECTION INTRAMUSCULAR; INTRAVENOUS at 22:12

## 2023-01-06 RX ADMIN — LEVOFLOXACIN 750 MG: 750 INJECTION, SOLUTION INTRAVENOUS at 23:35

## 2023-01-06 RX ADMIN — SODIUM CHLORIDE 1000 ML: 9 INJECTION, SOLUTION INTRAVENOUS at 23:34

## 2023-01-06 ASSESSMENT — ENCOUNTER SYMPTOMS
COUGH: 1
WEAKNESS: 1
ABDOMINAL PAIN: 0
VOMITING: 1

## 2023-01-06 ASSESSMENT — ACTIVITIES OF DAILY LIVING (ADL): ADLS_ACUITY_SCORE: 35

## 2023-01-07 ENCOUNTER — APPOINTMENT (OUTPATIENT)
Dept: CARDIOLOGY | Facility: CLINIC | Age: 88
DRG: 871 | End: 2023-01-07
Attending: INTERNAL MEDICINE
Payer: COMMERCIAL

## 2023-01-07 ENCOUNTER — APPOINTMENT (OUTPATIENT)
Dept: PHYSICAL THERAPY | Facility: CLINIC | Age: 88
DRG: 871 | End: 2023-01-07
Attending: INTERNAL MEDICINE
Payer: COMMERCIAL

## 2023-01-07 ENCOUNTER — APPOINTMENT (OUTPATIENT)
Dept: CT IMAGING | Facility: CLINIC | Age: 88
DRG: 871 | End: 2023-01-07
Attending: EMERGENCY MEDICINE
Payer: COMMERCIAL

## 2023-01-07 PROBLEM — R65.21 SEPTIC SHOCK (H): Status: ACTIVE | Noted: 2023-01-07

## 2023-01-07 PROBLEM — J18.9 PNEUMONIA OF BOTH LOWER LOBES DUE TO INFECTIOUS ORGANISM: Status: ACTIVE | Noted: 2023-01-07

## 2023-01-07 PROBLEM — B99.9 INFECTIOUS ENCEPHALOPATHY: Status: ACTIVE | Noted: 2023-01-07

## 2023-01-07 PROBLEM — G93.49 INFECTIOUS ENCEPHALOPATHY: Status: ACTIVE | Noted: 2023-01-07

## 2023-01-07 PROBLEM — E87.20 LACTIC ACIDEMIA: Status: ACTIVE | Noted: 2023-01-07

## 2023-01-07 PROBLEM — A41.9 SEPTIC SHOCK (H): Status: ACTIVE | Noted: 2023-01-07

## 2023-01-07 LAB
ALBUMIN UR-MCNC: 50 MG/DL
ANION GAP SERPL CALCULATED.3IONS-SCNC: 17 MMOL/L (ref 7–15)
APPEARANCE UR: CLEAR
ATRIAL RATE - MUSE: 76 BPM
BASE EXCESS BLDV CALC-SCNC: -3.6 MMOL/L (ref -7.7–1.9)
BILIRUB UR QL STRIP: NEGATIVE
BUN SERPL-MCNC: 27.8 MG/DL (ref 8–23)
C COLI+JEJUNI+LARI FUSA STL QL NAA+PROBE: NOT DETECTED
C DIFF TOX B STL QL: NEGATIVE
CALCIUM SERPL-MCNC: 9 MG/DL (ref 8.8–10.2)
CHLORIDE SERPL-SCNC: 104 MMOL/L (ref 98–107)
CK SERPL-CCNC: 138 U/L (ref 26–192)
COLOR UR AUTO: YELLOW
CREAT SERPL-MCNC: 1.11 MG/DL (ref 0.51–0.95)
DEPRECATED HCO3 PLAS-SCNC: 18 MMOL/L (ref 22–29)
DIASTOLIC BLOOD PRESSURE - MUSE: NORMAL MMHG
EC STX1 GENE STL QL NAA+PROBE: NOT DETECTED
EC STX2 GENE STL QL NAA+PROBE: NOT DETECTED
ERYTHROCYTE [DISTWIDTH] IN BLOOD BY AUTOMATED COUNT: 13.8 % (ref 10–15)
GFR SERPL CREATININE-BSD FRML MDRD: 48 ML/MIN/1.73M2
GLUCOSE BLDC GLUCOMTR-MCNC: 103 MG/DL (ref 70–99)
GLUCOSE BLDC GLUCOMTR-MCNC: 124 MG/DL (ref 70–99)
GLUCOSE BLDC GLUCOMTR-MCNC: 145 MG/DL (ref 70–99)
GLUCOSE BLDC GLUCOMTR-MCNC: 160 MG/DL (ref 70–99)
GLUCOSE BLDC GLUCOMTR-MCNC: 190 MG/DL (ref 70–99)
GLUCOSE BLDC GLUCOMTR-MCNC: 212 MG/DL (ref 70–99)
GLUCOSE SERPL-MCNC: 239 MG/DL (ref 70–99)
GLUCOSE UR STRIP-MCNC: NEGATIVE MG/DL
HCO3 BLDV-SCNC: 18 MMOL/L (ref 21–28)
HCO3 BLDV-SCNC: 19 MMOL/L (ref 21–28)
HCO3 BLDV-SCNC: 23 MMOL/L (ref 21–28)
HCT VFR BLD AUTO: 35.8 % (ref 35–47)
HGB BLD-MCNC: 11 G/DL (ref 11.7–15.7)
HGB UR QL STRIP: NEGATIVE
HYALINE CASTS: 4 /LPF
INTERPRETATION ECG - MUSE: NORMAL
KETONES UR STRIP-MCNC: NEGATIVE MG/DL
LACTATE BLD-SCNC: 6.9 MMOL/L
LACTATE BLD-SCNC: 7 MMOL/L
LACTATE SERPL-SCNC: 2.3 MMOL/L (ref 0.7–2)
LACTATE SERPL-SCNC: 3.1 MMOL/L (ref 0.7–2)
LACTATE SERPL-SCNC: 3.6 MMOL/L (ref 0.7–2)
LACTATE SERPL-SCNC: 7.8 MMOL/L (ref 0.7–2)
LEUKOCYTE ESTERASE UR QL STRIP: NEGATIVE
LVEF ECHO: NORMAL
MCH RBC QN AUTO: 29.6 PG (ref 26.5–33)
MCHC RBC AUTO-ENTMCNC: 30.7 G/DL (ref 31.5–36.5)
MCV RBC AUTO: 96 FL (ref 78–100)
MUCOUS THREADS #/AREA URNS LPF: PRESENT /LPF
NITRATE UR QL: NEGATIVE
NOROV GI+II ORF1-ORF2 JNC STL QL NAA+PR: NOT DETECTED
O2/TOTAL GAS SETTING VFR VENT: 2 %
P AXIS - MUSE: 44 DEGREES
PCO2 BLDV: 46 MM HG (ref 40–50)
PCO2 BLDV: 51 MM HG (ref 40–50)
PCO2 BLDV: 53 MM HG (ref 40–50)
PH BLDV: 7.15 [PH] (ref 7.32–7.43)
PH BLDV: 7.16 [PH] (ref 7.32–7.43)
PH BLDV: 7.31 [PH] (ref 7.32–7.43)
PH UR STRIP: 5.5 [PH] (ref 5–7)
PLATELET # BLD AUTO: 189 10E3/UL (ref 150–450)
PO2 BLDV: 27 MM HG (ref 25–47)
PO2 BLDV: 34 MM HG (ref 25–47)
PO2 BLDV: 35 MM HG (ref 25–47)
POTASSIUM SERPL-SCNC: 4.8 MMOL/L (ref 3.4–5.3)
POTASSIUM SERPL-SCNC: 5.4 MMOL/L (ref 3.4–5.3)
PR INTERVAL - MUSE: 166 MS
QRS DURATION - MUSE: 80 MS
QT - MUSE: 376 MS
QTC - MUSE: 423 MS
R AXIS - MUSE: -4 DEGREES
RBC # BLD AUTO: 3.72 10E6/UL (ref 3.8–5.2)
RBC URINE: 0 /HPF
RVA NSP5 STL QL NAA+PROBE: NOT DETECTED
SALMONELLA SP RPOD STL QL NAA+PROBE: NOT DETECTED
SAO2 % BLDV: 36 % (ref 94–100)
SAO2 % BLDV: 50 % (ref 94–100)
SHIGELLA SP+EIEC IPAH STL QL NAA+PROBE: NOT DETECTED
SODIUM SERPL-SCNC: 139 MMOL/L (ref 136–145)
SP GR UR STRIP: 1.02 (ref 1–1.03)
SQUAMOUS EPITHELIAL: <1 /HPF
SYSTOLIC BLOOD PRESSURE - MUSE: NORMAL MMHG
T AXIS - MUSE: 23 DEGREES
TROPONIN T SERPL HS-MCNC: 25 NG/L
UROBILINOGEN UR STRIP-MCNC: NORMAL MG/DL
V CHOL+PARA RFBL+TRKH+TNAA STL QL NAA+PR: NOT DETECTED
VENTRICULAR RATE- MUSE: 76 BPM
WBC # BLD AUTO: 14.1 10E3/UL (ref 4–11)
WBC URINE: <1 /HPF
Y ENTERO RECN STL QL NAA+PROBE: NOT DETECTED

## 2023-01-07 PROCEDURE — 84132 ASSAY OF SERUM POTASSIUM: CPT | Performed by: INTERNAL MEDICINE

## 2023-01-07 PROCEDURE — 250N000012 HC RX MED GY IP 250 OP 636 PS 637: Performed by: INTERNAL MEDICINE

## 2023-01-07 PROCEDURE — 258N000003 HC RX IP 258 OP 636: Performed by: INTERNAL MEDICINE

## 2023-01-07 PROCEDURE — 83605 ASSAY OF LACTIC ACID: CPT | Performed by: INTERNAL MEDICINE

## 2023-01-07 PROCEDURE — 250N000013 HC RX MED GY IP 250 OP 250 PS 637: Performed by: INTERNAL MEDICINE

## 2023-01-07 PROCEDURE — 82803 BLOOD GASES ANY COMBINATION: CPT | Performed by: INTERNAL MEDICINE

## 2023-01-07 PROCEDURE — 87506 IADNA-DNA/RNA PROBE TQ 6-11: CPT | Performed by: INTERNAL MEDICINE

## 2023-01-07 PROCEDURE — 250N000009 HC RX 250: Performed by: EMERGENCY MEDICINE

## 2023-01-07 PROCEDURE — 84484 ASSAY OF TROPONIN QUANT: CPT | Performed by: INTERNAL MEDICINE

## 2023-01-07 PROCEDURE — 81001 URINALYSIS AUTO W/SCOPE: CPT | Performed by: EMERGENCY MEDICINE

## 2023-01-07 PROCEDURE — 97530 THERAPEUTIC ACTIVITIES: CPT | Mod: GP | Performed by: PHYSICAL THERAPIST

## 2023-01-07 PROCEDURE — 36415 COLL VENOUS BLD VENIPUNCTURE: CPT | Performed by: INTERNAL MEDICINE

## 2023-01-07 PROCEDURE — 99222 1ST HOSP IP/OBS MODERATE 55: CPT | Performed by: SURGERY

## 2023-01-07 PROCEDURE — 250N000011 HC RX IP 250 OP 636: Performed by: EMERGENCY MEDICINE

## 2023-01-07 PROCEDURE — 99207 PR NO CHARGE LOS: CPT | Performed by: INTERNAL MEDICINE

## 2023-01-07 PROCEDURE — 82803 BLOOD GASES ANY COMBINATION: CPT

## 2023-01-07 PROCEDURE — 74177 CT ABD & PELVIS W/CONTRAST: CPT

## 2023-01-07 PROCEDURE — 93306 TTE W/DOPPLER COMPLETE: CPT

## 2023-01-07 PROCEDURE — 96374 THER/PROPH/DIAG INJ IV PUSH: CPT | Mod: 59

## 2023-01-07 PROCEDURE — 96375 TX/PRO/DX INJ NEW DRUG ADDON: CPT

## 2023-01-07 PROCEDURE — 96366 THER/PROPH/DIAG IV INF ADDON: CPT

## 2023-01-07 PROCEDURE — 93306 TTE W/DOPPLER COMPLETE: CPT | Mod: 26 | Performed by: INTERNAL MEDICINE

## 2023-01-07 PROCEDURE — 96361 HYDRATE IV INFUSION ADD-ON: CPT

## 2023-01-07 PROCEDURE — 87493 C DIFF AMPLIFIED PROBE: CPT | Performed by: INTERNAL MEDICINE

## 2023-01-07 PROCEDURE — 120N000001 HC R&B MED SURG/OB

## 2023-01-07 PROCEDURE — 87040 BLOOD CULTURE FOR BACTERIA: CPT | Performed by: INTERNAL MEDICINE

## 2023-01-07 PROCEDURE — 97161 PT EVAL LOW COMPLEX 20 MIN: CPT | Mod: GP | Performed by: PHYSICAL THERAPIST

## 2023-01-07 PROCEDURE — 250N000011 HC RX IP 250 OP 636: Performed by: INTERNAL MEDICINE

## 2023-01-07 PROCEDURE — 80048 BASIC METABOLIC PNL TOTAL CA: CPT | Performed by: INTERNAL MEDICINE

## 2023-01-07 PROCEDURE — 258N000003 HC RX IP 258 OP 636: Performed by: EMERGENCY MEDICINE

## 2023-01-07 PROCEDURE — 85027 COMPLETE CBC AUTOMATED: CPT | Performed by: INTERNAL MEDICINE

## 2023-01-07 PROCEDURE — 99223 1ST HOSP IP/OBS HIGH 75: CPT | Mod: AI | Performed by: INTERNAL MEDICINE

## 2023-01-07 PROCEDURE — 96367 TX/PROPH/DG ADDL SEQ IV INF: CPT

## 2023-01-07 RX ORDER — NALOXONE HYDROCHLORIDE 0.4 MG/ML
0.4 INJECTION, SOLUTION INTRAMUSCULAR; INTRAVENOUS; SUBCUTANEOUS
Status: DISCONTINUED | OUTPATIENT
Start: 2023-01-07 | End: 2023-01-10 | Stop reason: HOSPADM

## 2023-01-07 RX ORDER — ONDANSETRON 2 MG/ML
4 INJECTION INTRAMUSCULAR; INTRAVENOUS EVERY 6 HOURS PRN
Status: DISCONTINUED | OUTPATIENT
Start: 2023-01-07 | End: 2023-01-10 | Stop reason: HOSPADM

## 2023-01-07 RX ORDER — NICOTINE POLACRILEX 4 MG
15-30 LOZENGE BUCCAL
Status: DISCONTINUED | OUTPATIENT
Start: 2023-01-07 | End: 2023-01-10 | Stop reason: HOSPADM

## 2023-01-07 RX ORDER — GABAPENTIN 300 MG/1
300 CAPSULE ORAL EVERY EVENING
Status: DISCONTINUED | OUTPATIENT
Start: 2023-01-07 | End: 2023-01-10 | Stop reason: HOSPADM

## 2023-01-07 RX ORDER — ASPIRIN 81 MG/1
81 TABLET ORAL DAILY
Status: DISCONTINUED | OUTPATIENT
Start: 2023-01-07 | End: 2023-01-10 | Stop reason: HOSPADM

## 2023-01-07 RX ORDER — HYDROMORPHONE HYDROCHLORIDE 1 MG/ML
0.5 INJECTION, SOLUTION INTRAMUSCULAR; INTRAVENOUS; SUBCUTANEOUS
Status: DISCONTINUED | OUTPATIENT
Start: 2023-01-07 | End: 2023-01-10 | Stop reason: HOSPADM

## 2023-01-07 RX ORDER — SIMVASTATIN 20 MG
20 TABLET ORAL AT BEDTIME
Status: DISCONTINUED | OUTPATIENT
Start: 2023-01-07 | End: 2023-01-10 | Stop reason: HOSPADM

## 2023-01-07 RX ORDER — LEVOFLOXACIN 5 MG/ML
500 INJECTION, SOLUTION INTRAVENOUS EVERY 24 HOURS
Status: DISCONTINUED | OUTPATIENT
Start: 2023-01-07 | End: 2023-01-07

## 2023-01-07 RX ORDER — IOPAMIDOL 755 MG/ML
500 INJECTION, SOLUTION INTRAVASCULAR ONCE
Status: COMPLETED | OUTPATIENT
Start: 2023-01-07 | End: 2023-01-07

## 2023-01-07 RX ORDER — SODIUM CHLORIDE, SODIUM LACTATE, POTASSIUM CHLORIDE, CALCIUM CHLORIDE 600; 310; 30; 20 MG/100ML; MG/100ML; MG/100ML; MG/100ML
INJECTION, SOLUTION INTRAVENOUS CONTINUOUS
Status: ACTIVE | OUTPATIENT
Start: 2023-01-07 | End: 2023-01-08

## 2023-01-07 RX ORDER — METRONIDAZOLE 500 MG/100ML
500 INJECTION, SOLUTION INTRAVENOUS EVERY 8 HOURS
Status: DISCONTINUED | OUTPATIENT
Start: 2023-01-07 | End: 2023-01-10 | Stop reason: HOSPADM

## 2023-01-07 RX ORDER — CITALOPRAM HYDROBROMIDE 20 MG/1
20 TABLET ORAL DAILY
Status: DISCONTINUED | OUTPATIENT
Start: 2023-01-07 | End: 2023-01-10 | Stop reason: HOSPADM

## 2023-01-07 RX ORDER — HYDROCODONE BITARTRATE AND ACETAMINOPHEN 5; 325 MG/1; MG/1
1 TABLET ORAL 2 TIMES DAILY
Status: DISCONTINUED | OUTPATIENT
Start: 2023-01-07 | End: 2023-01-09

## 2023-01-07 RX ORDER — ACETAMINOPHEN 325 MG/1
650 TABLET ORAL EVERY 4 HOURS PRN
Status: DISCONTINUED | OUTPATIENT
Start: 2023-01-07 | End: 2023-01-10 | Stop reason: HOSPADM

## 2023-01-07 RX ORDER — LATANOPROST 50 UG/ML
1 SOLUTION/ DROPS OPHTHALMIC AT BEDTIME
Status: DISCONTINUED | OUTPATIENT
Start: 2023-01-07 | End: 2023-01-10 | Stop reason: HOSPADM

## 2023-01-07 RX ORDER — HYDROMORPHONE HYDROCHLORIDE 1 MG/ML
0.3 INJECTION, SOLUTION INTRAMUSCULAR; INTRAVENOUS; SUBCUTANEOUS
Status: DISCONTINUED | OUTPATIENT
Start: 2023-01-07 | End: 2023-01-10 | Stop reason: HOSPADM

## 2023-01-07 RX ORDER — LISINOPRIL 20 MG/1
20 TABLET ORAL DAILY
Status: DISCONTINUED | OUTPATIENT
Start: 2023-01-07 | End: 2023-01-09

## 2023-01-07 RX ORDER — ACETAMINOPHEN 325 MG/10.15ML
650 LIQUID ORAL EVERY 4 HOURS PRN
Status: DISCONTINUED | OUTPATIENT
Start: 2023-01-07 | End: 2023-01-10 | Stop reason: HOSPADM

## 2023-01-07 RX ORDER — LEVOFLOXACIN 5 MG/ML
750 INJECTION, SOLUTION INTRAVENOUS
Status: DISCONTINUED | OUTPATIENT
Start: 2023-01-08 | End: 2023-01-09

## 2023-01-07 RX ORDER — ONDANSETRON 2 MG/ML
4 INJECTION INTRAMUSCULAR; INTRAVENOUS ONCE
Status: COMPLETED | OUTPATIENT
Start: 2023-01-07 | End: 2023-01-07

## 2023-01-07 RX ORDER — METRONIDAZOLE 500 MG/100ML
500 INJECTION, SOLUTION INTRAVENOUS ONCE
Status: COMPLETED | OUTPATIENT
Start: 2023-01-07 | End: 2023-01-07

## 2023-01-07 RX ORDER — NALOXONE HYDROCHLORIDE 0.4 MG/ML
0.2 INJECTION, SOLUTION INTRAMUSCULAR; INTRAVENOUS; SUBCUTANEOUS
Status: DISCONTINUED | OUTPATIENT
Start: 2023-01-07 | End: 2023-01-10 | Stop reason: HOSPADM

## 2023-01-07 RX ORDER — OXYCODONE HCL 10 MG/1
10 TABLET, FILM COATED, EXTENDED RELEASE ORAL AT BEDTIME
Status: DISCONTINUED | OUTPATIENT
Start: 2023-01-07 | End: 2023-01-09

## 2023-01-07 RX ORDER — PROCHLORPERAZINE MALEATE 5 MG
5 TABLET ORAL EVERY 6 HOURS PRN
Status: DISCONTINUED | OUTPATIENT
Start: 2023-01-07 | End: 2023-01-10 | Stop reason: HOSPADM

## 2023-01-07 RX ORDER — PREGABALIN 75 MG/1
150 CAPSULE ORAL 3 TIMES DAILY
Status: DISCONTINUED | OUTPATIENT
Start: 2023-01-07 | End: 2023-01-10 | Stop reason: HOSPADM

## 2023-01-07 RX ORDER — PROCHLORPERAZINE 25 MG
12.5 SUPPOSITORY, RECTAL RECTAL EVERY 12 HOURS PRN
Status: DISCONTINUED | OUTPATIENT
Start: 2023-01-07 | End: 2023-01-10 | Stop reason: HOSPADM

## 2023-01-07 RX ORDER — ONDANSETRON 4 MG/1
4 TABLET, ORALLY DISINTEGRATING ORAL EVERY 6 HOURS PRN
Status: DISCONTINUED | OUTPATIENT
Start: 2023-01-07 | End: 2023-01-10 | Stop reason: HOSPADM

## 2023-01-07 RX ORDER — DEXTROSE MONOHYDRATE 25 G/50ML
25-50 INJECTION, SOLUTION INTRAVENOUS
Status: DISCONTINUED | OUTPATIENT
Start: 2023-01-07 | End: 2023-01-07

## 2023-01-07 RX ORDER — LEVOTHYROXINE SODIUM 112 UG/1
112 TABLET ORAL
Status: DISCONTINUED | OUTPATIENT
Start: 2023-01-07 | End: 2023-01-10 | Stop reason: HOSPADM

## 2023-01-07 RX ORDER — NICOTINE POLACRILEX 4 MG
15-30 LOZENGE BUCCAL
Status: DISCONTINUED | OUTPATIENT
Start: 2023-01-07 | End: 2023-01-07

## 2023-01-07 RX ORDER — DEXTROSE MONOHYDRATE 25 G/50ML
25-50 INJECTION, SOLUTION INTRAVENOUS
Status: DISCONTINUED | OUTPATIENT
Start: 2023-01-07 | End: 2023-01-10 | Stop reason: HOSPADM

## 2023-01-07 RX ADMIN — ASPIRIN 81 MG: 81 TABLET, COATED ORAL at 08:29

## 2023-01-07 RX ADMIN — GABAPENTIN 300 MG: 300 CAPSULE ORAL at 22:07

## 2023-01-07 RX ADMIN — OXYCODONE HYDROCHLORIDE 10 MG: 10 TABLET, FILM COATED, EXTENDED RELEASE ORAL at 22:07

## 2023-01-07 RX ADMIN — LEVOTHYROXINE SODIUM 112 MCG: 0.11 TABLET ORAL at 08:29

## 2023-01-07 RX ADMIN — PROCHLORPERAZINE EDISYLATE 5 MG: 5 INJECTION INTRAMUSCULAR; INTRAVENOUS at 02:26

## 2023-01-07 RX ADMIN — SODIUM CHLORIDE 1000 ML: 9 INJECTION, SOLUTION INTRAVENOUS at 00:56

## 2023-01-07 RX ADMIN — INSULIN ASPART 2 UNITS: 100 INJECTION, SOLUTION INTRAVENOUS; SUBCUTANEOUS at 09:56

## 2023-01-07 RX ADMIN — INSULIN ASPART 1 UNITS: 100 INJECTION, SOLUTION INTRAVENOUS; SUBCUTANEOUS at 12:39

## 2023-01-07 RX ADMIN — ACETAMINOPHEN 650 MG: 325 TABLET, FILM COATED ORAL at 08:29

## 2023-01-07 RX ADMIN — ONDANSETRON 4 MG: 2 INJECTION INTRAMUSCULAR; INTRAVENOUS at 00:40

## 2023-01-07 RX ADMIN — SODIUM CHLORIDE, POTASSIUM CHLORIDE, SODIUM LACTATE AND CALCIUM CHLORIDE: 600; 310; 30; 20 INJECTION, SOLUTION INTRAVENOUS at 04:12

## 2023-01-07 RX ADMIN — CITALOPRAM HYDROBROMIDE 20 MG: 20 TABLET ORAL at 08:29

## 2023-01-07 RX ADMIN — SIMVASTATIN 20 MG: 20 TABLET, FILM COATED ORAL at 22:07

## 2023-01-07 RX ADMIN — PREGABALIN 150 MG: 75 CAPSULE ORAL at 20:29

## 2023-01-07 RX ADMIN — PREGABALIN 150 MG: 75 CAPSULE ORAL at 15:52

## 2023-01-07 RX ADMIN — SODIUM CHLORIDE, POTASSIUM CHLORIDE, SODIUM LACTATE AND CALCIUM CHLORIDE 500 ML: 600; 310; 30; 20 INJECTION, SOLUTION INTRAVENOUS at 04:40

## 2023-01-07 RX ADMIN — HYDROCODONE BITARTRATE AND ACETAMINOPHEN 1 TABLET: 5; 325 TABLET ORAL at 20:29

## 2023-01-07 RX ADMIN — INSULIN ASPART 1 UNITS: 100 INJECTION, SOLUTION INTRAVENOUS; SUBCUTANEOUS at 15:55

## 2023-01-07 RX ADMIN — SODIUM CHLORIDE, POTASSIUM CHLORIDE, SODIUM LACTATE AND CALCIUM CHLORIDE: 600; 310; 30; 20 INJECTION, SOLUTION INTRAVENOUS at 22:10

## 2023-01-07 RX ADMIN — METRONIDAZOLE 500 MG: 500 INJECTION, SOLUTION INTRAVENOUS at 18:15

## 2023-01-07 RX ADMIN — PREGABALIN 150 MG: 75 CAPSULE ORAL at 09:51

## 2023-01-07 RX ADMIN — SODIUM CHLORIDE, POTASSIUM CHLORIDE, SODIUM LACTATE AND CALCIUM CHLORIDE: 600; 310; 30; 20 INJECTION, SOLUTION INTRAVENOUS at 09:53

## 2023-01-07 RX ADMIN — IOPAMIDOL 73 ML: 755 INJECTION, SOLUTION INTRAVENOUS at 01:27

## 2023-01-07 RX ADMIN — SODIUM CHLORIDE 58 ML: 9 INJECTION, SOLUTION INTRAVENOUS at 01:27

## 2023-01-07 RX ADMIN — LATANOPROST 1 DROP: 50 SOLUTION/ DROPS OPHTHALMIC at 22:48

## 2023-01-07 RX ADMIN — METRONIDAZOLE 500 MG: 500 INJECTION, SOLUTION INTRAVENOUS at 02:25

## 2023-01-07 RX ADMIN — METRONIDAZOLE 500 MG: 500 INJECTION, SOLUTION INTRAVENOUS at 09:53

## 2023-01-07 ASSESSMENT — ACTIVITIES OF DAILY LIVING (ADL)
ADLS_ACUITY_SCORE: 24
ADLS_ACUITY_SCORE: 24
ADLS_ACUITY_SCORE: 35
ADLS_ACUITY_SCORE: 23
ADLS_ACUITY_SCORE: 35
ADLS_ACUITY_SCORE: 24
ADLS_ACUITY_SCORE: 35
ADLS_ACUITY_SCORE: 24

## 2023-01-07 NOTE — PROGRESS NOTES
Please see admit H&P from Dr. Pearson from earlier today    I saw and examined this patient today    Pt presented with syncope, abd pain, diarrhea    Vital signs notable for hypotension that has since resolved.  She is on low rate supplemental oxygen at 2 liters    Lab work notable for persistent lactic acidosis in the 7-8 range (now improving and most recent result 3.6).  WBC count near 18 on presentation with initial VBG c/w acute resp acidosis and metabolic acidosis.  Most recent VBG significantly improved.  UA neg    TTE shows normal EF with moderate AV stensosi    CT chest/abd/pelvis shows descending colitis, B lower lobe infiltrates, and large hiatal hernia    The patient has clinically improved since admission.  She has an unremarkable exam; abdomen has normal bowel sounds and minimal TTP    Assessment:  - descending colitis:  Ischemic vs infectious  - severe metabolic (lactic) acidosis:  Due to dehydration and probable ischemic colitis.  Improving  - possible PNA on CT imaging with mild hypoxia on presentation  - syncope, likely related to dehydration/vasovagal etiology.  TTE notable for moderate AV stenosis; doubt primary cause of syncope (though presumably if patient dehydrated enough could be contributing factor)    Plans today:  - continue IVF today; stop tomorrow AM and reassess need  - continue Levaquin and Flagyl  - stool cx/cdiff pending  - surgery evaluated the patient; no operative intervention planned  - will repeat potassium (mild hyperkalemia noted on AM labs)  - obtain blood cx.  Does not appear to have been done on admit and as patient has already received antibiotics will be somewhat compromised  - can transfer to floor    Addendum:  I returned to the patient's room this afternoon and updated family (2 daughters and a daughter-in-law) at bedside today

## 2023-01-07 NOTE — PROGRESS NOTES
01/07/23 1622   Appointment Info   Signing Clinician's Name / Credentials (PT) Anne Wolfe DPT   Living Environment   People in Home alone   Current Living Arrangements house   Home Accessibility stairs to enter home;stairs within home   Number of Stairs, Main Entrance 6   Stair Railings, Main Entrance railings safe and in good condition   Number of Stairs, Within Home, Primary six   Stair Railings, Within Home, Primary railings safe and in good condition   Transportation Anticipated family or friend will provide   Living Environment Comments Per family, pt lives alone in 4 level townhouse with 6 steps between each level, pt's 4 kids live nearby and take turns assisting wtih transportation, youngest dtr assists with showering (has shower chair); pt heats up microwave meals or leftovers that kids bring to her; family report pt has life alert pendant but they are concerned she is remembering to use it appropriately; dtr sets up medication pill box weekly but pt occasionally forgets to take her meds   Self-Care   Usual Activity Tolerance moderate   Current Activity Tolerance fair   Equipment Currently Used at Home cane, straight;shower chair;walker, rolling   Fall history within last six months yes   Number of times patient has fallen within last six months 4   General Information   Onset of Illness/Injury or Date of Surgery 01/05/23   Referring Physician Reed Pearson MD   Patient/Family Therapy Goals Statement (PT) to get stronger, pt hopeful to go home   Pertinent History of Current Problem (include personal factors and/or comorbidities that impact the POC) 87 year old female with PMH including type II DM, HTN, HLD, anxiety, hypothyroidism, and neuropathy on chronic opiates who presents via EMS after 2 syncopal episodes witnessed by family. Suspected to have descending colitis   Existing Precautions/Restrictions fall   General Observations Supine, snoring, somewhat easy to arouse, NAD   Cognition    Affect/Mental Status (Cognition) confused   Orientation Status (Cognition) disoriented to;person;place  (knew Jan and 2023 but not day or why she was in the hospital)   Follows Commands (Cognition) follows one-step commands;75-90% accuracy   Pain Assessment   Patient Currently in Pain No   Integumentary/Edema   Integumentary/Edema Comments see RN assessment   Posture    Posture Protracted shoulders;Forward head position   Range of Motion (ROM)   Range of Motion ROM is WFL   Strength (Manual Muscle Testing)   Strength (Manual Muscle Testing) Deficits observed during functional mobility   Bed Mobility   Bed Mobility supine-sit   Supine-Sit [place] (Bed Mobility) moderate assist (50% patient effort)   Comment, (Bed Mobility) unable to perform on own, needs mod A at trunk   Transfers   Transfers sit-stand transfer   Sit-Stand Transfer   Sit-Stand [place] (Transfers) moderate assist (50% patient effort)   Assistive Device (Sit-Stand Transfers) walker, front-wheeled   Gait/Stairs (Locomotion)   [place] Level (Gait) minimum assist (75% patient effort)   Assistive Device (Gait) walker, front-wheeled   Distance in Feet 5'   Deviations/Abnormal Patterns (Gait) base of support, narrow   Comment, (Gait/Stairs) flexed posture   Balance   Balance Comments Fair, requires AD for balance   Clinical Impression   Criteria for Skilled Therapeutic Intervention Yes, treatment indicated   PT Diagnosis (PT) decreased functional mobility   Influenced by the following impairments weakness, impaired balance   Functional limitations due to impairments bed mobility, transfers, ambulation, stair climbing   Clinical Presentation (PT Evaluation Complexity) Stable/Uncomplicated   Clinical Presentation Rationale clinical judgement   Clinical Decision Making (Complexity) low complexity   Planned Therapy Interventions (PT) balance training;bed mobility training;gait training;home exercise program;neuromuscular  re-education;patient/family education;postural re-education;ROM (range of motion);strengthening;transfer training   Risk & Benefits of therapy have been explained care plan/treatment goals reviewed;evaluation/treatment results reviewed;risks/benefits reviewed;current/potential barriers reviewed;participants voiced agreement with care plan;participants included;patient;daughter;son   PT Total Evaluation Time   PT Eval, Low Complexity Minutes (02209) 10   Physical Therapy Goals   PT Frequency 5x/week   PT Predicted Duration/Target Date for Goal Attainment 01/14/23   PT Goals Bed Mobility;Transfers;Gait;Stairs   PT: Bed Mobility Independent;Supine to/from sit   PT: Transfers Modified independent;Sit to/from stand;Bed to/from chair;Assistive device   PT: Gait Modified independent;Assistive device;100 feet   PT: Stairs Modified independent;6 stairs;Assistive device   PT Discharge Planning   PT Plan progress mobility, trial stairs when able   PT Discharge Recommendation (DC Rec) Transitional Care Facility   PT Rationale for DC Rec Currently rec TCU as pt needs Ax1 for all mobility, has a h/o falls and lives alone.  TCU will help with strengthening, gait, and balance.   PT Brief overview of current status mod A with bed mobility; CGA wiht 2WW short distance ambulation   Total Session Time   Timed Code Treatment Minutes 45   Total Session Time (sum of timed and untimed services) 55

## 2023-01-07 NOTE — PHARMACY-ADMISSION MEDICATION HISTORY
Admission medication history interview status for this patient is complete. See [facility] admission navigator for allergy information, prior to admission medications and immunization status.     Medication history interview done, indicate source(s): Daughter  Medication history resources (including written lists, pill bottles, clinic record): SureScripts, Care Everywhere  Pharmacy: [facility] in [place]    Changes made to PTA medication list:  Added: none  Changed: none  Reported as Not Taking: none  Removed: none    Actions taken by pharmacist (provider contacted, etc): Pharmacist will message MD      Additional medication history information: Patient's daughter mentioned that patient did not get her pm or night medications/doses yesterday. This includes metformin, simvastatin, oxycontin, and pregabalin. Patient administers own medication but the daughter sets them up for her.      Medication reconciliation/reorder completed by provider prior to medication history?  Y   (Y/N)     Prior to Admission medications    Medication Sig Last Dose Taking? Auth Provider Long Term End Date   aspirin 81 MG EC tablet Take 1 tablet by mouth daily. 1/6/2023 Yes Torin Lawson MD     citalopram (CELEXA) 20 MG tablet Take 1 tablet (20 mg) by mouth daily 1/6/2023 Yes Berhane Stevens MD Yes    gabapentin (NEURONTIN) 300 MG capsule Take 1 capsule (300 mg) by mouth every evening 1/6/2023 Yes Berhane Stevens MD Yes    HYDROcodone-acetaminophen (NORCO) 5-325 MG tablet TAKE 1/2 A TABLET BY MOUTH TWICE DAILY. 1/6/2023 Yes Reported, Patient     latanoprost (XALATAN) 0.005 % ophthalmic solution INSTILL 1 DROP INTO EACH EYE AT BEDTIME. Past Month Yes Reported, Patient No    levothyroxine (SYNTHROID/LEVOTHROID) 112 MCG tablet TAKE ONE TABLET BY MOUTH ONE TIME DAILY 1/6/2023 Yes Berhane Stevens MD Yes    lisinopril (ZESTRIL) 20 MG tablet Take 1 tablet (20 mg) by mouth daily 1/6/2023 Yes Berhane Stevens MD Yes     metFORMIN (GLUCOPHAGE) 1000 MG tablet Take 1 tablet (1,000 mg) by mouth 2 times daily (with meals) 1/6/2023 at am Yes Berhane Stevens MD Yes    Multiple Vitamins-Minerals (SENIOR MULTIVITAMIN PLUS) TABS Take 1 tablet by mouth daily  Past Week Yes Reported, Patient     OXYCONTIN 10 MG 12 hr tablet Take 10 mg by mouth At Bedtime  Past Week Yes Reported, Patient No    pregabalin (LYRICA) 150 MG capsule Take 1 capsule (150 mg) by mouth 3 times daily 1/6/2023 Yes Berhane Stevens MD Yes    simvastatin (ZOCOR) 20 MG tablet TAKE 1 TABLET (20 MG) BY MOUTH AT BEDTIME Past Week Yes Berhane Stevens MD Yes    VITAMIN D, CHOLECALCIFEROL, PO Take 1,000 Units by mouth daily 1/6/2023 Yes Unknown, Entered By History     meclizine (ANTIVERT) 25 MG tablet TAKE ONE TABLET BY MOUTH EVERY SIX HOURS AS NEEDED FOR DIZZINESS prn at prn  Berhane Stevens MD

## 2023-01-07 NOTE — ED NOTES
"DATE:  1/7/2023   TIME OF RECEIPT FROM LAB:  0426  LAB TEST:  lactic acid  LAB VALUE:  8.2 --> 7.8  RESULTS GIVEN WITH READ-BACK TO (PROVIDER):  Dr. Deng  TIME LAB VALUE REPORTED TO PROVIDER:   0427  Per Casey Montejo in lab \"I repeated the test off of the sample and second result was 7.8 and overrode the first result\".  "

## 2023-01-07 NOTE — ED TRIAGE NOTES
"Pt BIBA for syncopal episode. Pt has had dry cough for \"a few days\" now. Pt got up to go to [facility] and had \"prolonged\" witnessed syncopal episode. Pt denies pain. Pt did not hit head. Pt vomited upon arrival to ED.       "

## 2023-01-07 NOTE — CONSULTS
General Surgery Consultation    Trisha Lucia MRN# [id number]   Age: 87 year old YOB: 1935     Date of Admission:  1/6/2023    Reason for consult:            Abdominal pain       Requesting physician:            Reed Pearson MD                Assessment and Plan:   Assessment:   Trisha Lucia is a 87 year old female with descending colitis, lactic acidosis, possible pneumonia with mild hypoxia, and syncopal episode, reducible small left inguinal hernia    Comorbidities:   has a past medical history of Cellulitis and abscess of foot (5/30/2008), DIABETES MELLITUS TYPE II-UNCOMPL (10/24/2007), Essential hypertension, benign, History of small bowel obstruction (6/27/2011), Mucous polyp of cervix (9/28/2004), Open wound of foot excluding toes (4/3/2013), Other abnormal glucose, Other specified idiopathic peripheral neuropathy, Sleep apnea, and Unspecified hypothyroidism.      Plan:   Colitis to continue to treat conservatively with IV abx, patient without acute abdomen at this point. Reducible left hernia also palpated. Abdominal tenderness localized to LLQ above area where hernia located.    No surgical intervention warranted at this time, will continue to monitor.                Chief Complaint:   Abdominal pain, diarrhea, syncope    History is obtained from the patient and family         History of Present Illness:   Trisha Lucia is a 87 year old who presented with syncopal episode, diarrhea, and abdominal pain. Found to have descending colitis on CT scan. Patient being treated with Abx. Pain improved slightly today and controlled. No additional syncopal episodes.  Lactic acid with persistent elevation overnight thus surgical consultation. Improvement today although still above normal limits.            Past Medical History:     Past Medical History:   Diagnosis Date     Cellulitis and abscess of foot 5/30/2008     DIABETES MELLITUS TYPE II-UNCOMPL 10/24/2007     Essential hypertension,  benign      History of small bowel obstruction 6/27/2011 6/16/2011 She had a CT of the abdomen in the emergency room which was suggestive of a small-bowel obstruction. Her lactic acid level was elevated. Surgery was consulted. The patient was put on IV fluids and pain medications. After surgical evaluation, she underwent surgery and had a laparotomy with lysis of adhesive band and small bowel resection with primary enteroenterostomy and appendectomy. During her hospital course, the patient had a good recovery.      Mucous polyp of cervix 9/28/2004     Open wound of foot excluding toes 4/3/2013     Problem list name updated by automated process. Provider to review     Other abnormal glucose      Other specified idiopathic peripheral neuropathy      Sleep apnea      Unspecified hypothyroidism              Past Surgical History:     Past Surgical History:   Procedure Laterality Date     BUNIONECTOMY  4/9/2013    Procedure: BUNIONECTOMY;  RIGHT GREAT CLAWTOE CORRECTION WITH TENDON TRANSFER, ENDOSCOPIC RECESSION OF GASTRONEMIUS, DEBRIDEMENT OF ULCER ON SOLE OF RIGHT FOOT;  Surgeon: Stephon Rae MD;  Location: [facility]     ENDOSCOPIC RECESSION GASTROCNEMIUS (DONTA)  4/9/2013    Procedure: ENDOSCOPIC RECESSION GASTROCNEMIUS (DONTA);;  Surgeon: Stephon Rae MD;  Location: [facility]     HC DEBRIDMENT MASTOID CAVITY, SIMPLE      L ear     HC KNEE SCOPE, DIAGNOSTIC  1997    Arthroscopy, Knee     HC KNEE SCOPE, DIAGNOSTIC  2001    Arthroscopy, Knee     HC OPEN TX TRIMALLEOLAR ANKLE FX W FIX PST LIP Right 2014     HC REPAIR OF HAMMERTOE,ONE  2015    [facility]     HERNIORRHAPHY INCISIONAL (LOCATION) N/A 1/13/2017    Procedure: HERNIORRHAPHY INCISIONAL (LOCATION);  Surgeon: Joaquin Skaggs MD;  Location: [facility]     IRRIGATION AND DEBRIDEMENT FOOT, COMBINED  4/9/2013    Procedure: COMBINED IRRIGATION AND DEBRIDEMENT FOOT;  Debridement of sole ulcer right foot;  Surgeon: Stephon Rae MD;   Location: [facility]     KNEE SURGERY  2011    right total-Dr. Gorman     Small bowel obstruction  6/2011    Small-bowel obstruction, status post laparotomy with lysis of adhesions, small bowel resection with primary enteroenterostomy and appendectomy     ZZ COLONOSCOPY THRU STOMA, DIAGNOSTIC  2001             Social History:     Social History     Tobacco Use     Smoking status: Never     Smokeless tobacco: Never   Substance Use Topics     Alcohol use: No     Alcohol/week: 0.0 standard drinks     Comment: 0             Family History:     Family History   Problem Relation Age of Onset     C.A.D. Mother      Diabetes No family hx of      Hypertension Mother      Breast Cancer No family hx of      Cancer - colorectal No family hx of             Allergies:     Allergies   Allergen Reactions     Cephalexin Other (See Comments) and Itching     Redness and peeling skin     Penicillins      Childhood rxn of red dots     Percocet [Oxycodone-Acetaminophen]      hallucinations     Shellfish Allergy      Sulfa Drugs              Medications:   No current facility-administered medications on file prior to encounter.  aspirin 81 MG EC tablet, Take 1 tablet by mouth daily.  citalopram (CELEXA) 20 MG tablet, Take 1 tablet (20 mg) by mouth daily  gabapentin (NEURONTIN) 300 MG capsule, Take 1 capsule (300 mg) by mouth every evening  HYDROcodone-acetaminophen (NORCO) 5-325 MG tablet, TAKE 1/2 A TABLET BY MOUTH TWICE DAILY.  latanoprost (XALATAN) 0.005 % ophthalmic solution, INSTILL 1 DROP INTO EACH EYE AT BEDTIME.  levothyroxine (SYNTHROID/LEVOTHROID) 112 MCG tablet, TAKE ONE TABLET BY MOUTH ONE TIME DAILY  lisinopril (ZESTRIL) 20 MG tablet, Take 1 tablet (20 mg) by mouth daily  meclizine (ANTIVERT) 25 MG tablet, TAKE ONE TABLET BY MOUTH EVERY SIX HOURS AS NEEDED FOR DIZZINESS  metFORMIN (GLUCOPHAGE) 1000 MG tablet, Take 1 tablet (1,000 mg) by mouth 2 times daily (with meals)  Multiple Vitamins-Minerals (SENIOR MULTIVITAMIN PLUS) TABS,  "Take 1 tablet by mouth daily   OXYCONTIN 10 MG 12 hr tablet, Take 10 mg by mouth At Bedtime   pregabalin (LYRICA) 150 MG capsule, Take 1 capsule (150 mg) by mouth 3 times daily  simvastatin (ZOCOR) 20 MG tablet, TAKE 1 TABLET (20 MG) BY MOUTH AT BEDTIME  VITAMIN D, CHOLECALCIFEROL, PO, Take 1,000 Units by mouth daily        aspirin  81 mg Oral Daily     citalopram  20 mg Oral Daily     gabapentin  300 mg Oral QPM     insulin aspart  1-6 Units Subcutaneous Q4H     levofloxacin  500 mg Intravenous Q24H     levothyroxine  112 mcg Oral QAM AC     [Held by provider] lisinopril  20 mg Oral Daily     metroNIDAZOLE  500 mg Intravenous Q8H     oxyCODONE  10 mg Oral At Bedtime     pregabalin  150 mg Oral TID     simvastatin  20 mg Oral At Bedtime            Review of Systems:   The 10 point review of systems is negative other than noted in the HPI.          Physical Exam:   /67   Pulse 71   Temp 98.4  F (36.9  C) (Temporal)   Resp 16   Ht 1.651 m (5' 5\")   Wt 68.5 kg (151 lb 1.6 oz)   SpO2 92%   BMI 25.14 kg/m    General - Well developed, well nourished female in no apparent distress  Eyes:  no scleral icterus or redness  Abdomen:soft, mild tenderness along left lower quadrant, no guarding. No generalized peritoneal findings.  Groin: reducible small left inguinal hernia, non-tender.   Neurologic: nonfocal  Psychiatric: Mood and affect appropriate         Data:   Labs reviewed    Imaging:  All imaging studies reviewed by me and my interpretation of the CT scan showing hiatal hernia and mild descending colitis, no obvious reasoning for significant elevation in lactate level.      Robert Pinon MD  1/7/2023 2:02 PM     Time spent with the patient, reviewing the EMR, reviewing laboratory and imaging studies, more than 50% of which was counseling and coordinating care:  52 minutes.         "

## 2023-01-07 NOTE — ED NOTES
Report given to Sona Horne RN in ICU.   SOFIA VILLEGAS Notified @ 1305 that PICC site has changed and is looking symptomatic: extremity circumference has increased from 12.5cm to 13cm and area around the site looks puffy with taunt skin compared to other extremity. NNP to come and evaluate. Continue to monitor    1330: NNP Geno at bedside to evaluate PICC. Order placed for x-ray to confirm placement. Continue to monitor.     1345: NNASHLI Lora at bedside to see x-ray. Order given to place PIV and then clamp PICC line until NNP is able to remove. Continue to monitor.     1420: SOFIA Lora notified @ 1420 that PIV has been successfully placed. NNP to come and remove PICC. Continue to monitor.     1500: SOFIA Lroa at bedside to remove PICC. Continue to monitor site.

## 2023-01-07 NOTE — PLAN OF CARE
ICU End of Shift Summary.  For vital signs and complete assessments, please see documentation flowsheets.        Pertinent assessments:   Neuro: A/O x4, numbness and tingling bilaterally in feet and all fingertips, no tenderness reported. Pt does have some weakness, slightly tremulous  Cardiac: SR, BP WDL,   Resp: LS diminished, dry cough  GI: BS active  : Purewick in place  Skin: blanchable redness on coccyx- skin tag on bottom of R foot  Lines: PIV x2 bilaterally AC  Drips: LR at 100 mL/hr    Major Shift Events:   Pt admitted to ICU at 0525.     Plan (Upcoming Events): ECHO, continue abx  Discharge/Transfer Needs: TBD     Bedside Shift Report Completed : yes  Bedside Safety Check Completed: yes

## 2023-01-07 NOTE — ED PROVIDER NOTES
"    History     Chief Complaint:  Syncope       HPI   Trisha Lucia is a 87 year old female presents with syncope. The patient's sons state that the patient was experiencing nausea this evening around 8:30PM. The patient was home and slept all day. She had only consumed a burger around 6/6:30PM. The patient started vomiting and lost consciousness twice on the toilet. Her family members were there to assist her at the time. They report that the patient did not fall or or hit her head. One of the patient's sons states that her blood oxygen saturation continuously dropped from 90-70% using their home oximeter. They also note that her \"BP was around 30\". One of her sons describe her state at that time to be \"white as a sheet\" along with her \"toes turning purple and finger tips feeling cold\". The patient and her sons endorse that she has a dry nonproductive cough along with generalized weakness. The patient denies any medication change and abdominal pain.     Independent Historian: Patient and her two sons      Review of External Notes: PCP notes     ROS:  Review of Systems   Respiratory: Positive for cough (Dry).    Gastrointestinal: Positive for vomiting. Negative for abdominal pain.   Neurological: Positive for weakness (Generalized).   All other systems reviewed and are negative.    Allergies:  Cephalexin  Penicillins  Percocet  Shellfish Allergy  Sulfa Drugs     Medications:    Aspirin 8  Celexa   Neurontin  Norco  Xalatan  Synthroid  Zestril  Antivert  Glucophage  Oxycontin  Lyrica  Zocor    Past Medical History:    Cellulitis and abscess of foot  Diabetes Mellitus (Type II)  Hypertension  Small bowel obstruction  Mucous polyp of cervix  Idiopathic peripheral neuropathy  Sleep apnea  Hypothyroidism      Past Surgical History:    Bunionectomy  Endoscopic recession gastrocnemius  HC Debridement mastoid cavity  Arthroscopy (Knee)  Open TX trimalleolar ankle FX W FIX PST LIP  HC repair of hammertoe, " one  Herniorrhaphy incisional  Combined irrigation and debridement foot  Total knee surgery 9R)  Small bowel obstruction   ZZHC colonoscopy trhu stoma    Family History:    Mother: C.A.D, Hypertension    Social History:  Patient arrived via EMS alone to the ED.   PCP: Berhane Stevens     Physical Exam     Patient Vitals for the past 24 hrs:   BP Temp Temp src Pulse Resp SpO2   01/07/23 0415 124/73 -- -- 89 -- 98 %   01/07/23 0400 110/56 -- -- 88 -- 96 %   01/07/23 0345 122/64 -- -- 93 -- 91 %   01/07/23 0330 111/53 -- -- 89 -- 98 %   01/07/23 0315 128/53 -- -- 86 -- 100 %   01/07/23 0300 118/61 -- -- 92 -- 99 %   01/07/23 0245 98/82 -- -- 91 -- 92 %   01/07/23 0230 (!) 140/120 -- -- 90 -- 95 %   01/07/23 0221 115/66 -- -- 101 -- --   01/07/23 0206 116/74 -- -- 64 -- --   01/07/23 0145 120/82 -- -- 87 -- 94 %   01/07/23 0115 -- -- -- 85 -- 97 %   01/07/23 0100 115/67 -- -- 84 -- 99 %   01/07/23 0045 105/56 -- -- 81 -- 96 %   01/07/23 0030 109/50 -- -- 77 -- 98 %   01/07/23 0015 115/57 -- -- 79 -- (!) 87 %   01/07/23 0000 96/87 -- -- 75 -- 97 %   01/06/23 2345 99/40 -- -- 73 -- 99 %   01/06/23 2340 99/40 -- -- 73 -- 91 %   01/06/23 2330 (!) 88/65 -- -- 77 -- (!) 88 %   01/06/23 2300 -- -- -- 76 -- (!) 69 %   01/06/23 2245 106/70 -- -- 73 -- 92 %   01/06/23 2230 126/61 -- -- 86 -- (!) 88 %   01/06/23 2215 (!) 160/149 -- -- 79 -- (!) 89 %   01/06/23 2209 (!) 134/109 97.9  F (36.6  C) Oral 88 18 93 %       Physical Exam  Constitutional: Alert, attentive, confused to recent events  HENT:    Nose: Nose normal.    Mouth/Throat: Oropharynx is clear, mucous membranes are moist   Eyes: EOM are normal.   CV: regular rate and rhythm; no murmurs, rubs or gallups  Chest: Effort normal and breath sounds normal.   GI:  There is no tenderness. No distension. Normal bowel sounds  MSK: Normal range of motion.   Neurological: Alert, attentive; 4/5 strength throughout, CN 2-12 intact  Skin: Skin is warm and dry.      Emergency  Department Course   ECG:  ECG results from 01/06/23   EKG 12 lead     Value    Systolic Blood Pressure     Diastolic Blood Pressure     Ventricular Rate 76    Atrial Rate 76    [place] Interval 166    QRS Duration 80        QTc 423    P Axis 44    R AXIS -4    T Axis 23    Interpretation ECG      Sinus rhythm with occasional Premature ventricular complexes  Otherwise normal ECG  When compared with ECG of 17-JUN-2011 15:54,  Premature ventricular complexes are now Present  Nonspecific T wave abnormality now evident in Anterior leads         Imaging:  CT Chest/Abdomen/Pelvis w Contrast   Final Result   IMPRESSION:   1.  Findings consistent with a nonspecific descending colitis.      2.  Large hiatal hernia. Both the intrathoracic and intra-abdominal portions of the stomach are fluid-filled. There is also fluid in the esophagus consistent with reflux.      3.  Bilateral lower lobe infectious or inflammatory infiltrates. Mild interstitial edema with very small pleural effusions.      4.  Severe coronary artery disease.      5.  Colonic diverticulosis.      CT Head w/o Contrast   Final Result   IMPRESSION:   1.  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 Question artifact versus age-indeterminate lacunar-type infarct involving the left midbrain. If the patient is experiencing an acute, focal or ongoing neurologic deficit, an MRI may    be indicated.   2.  No acute hemorrhage, abnormal extra-axial fluid collection, or mass/mass effect.   3.  Chronic senescent and presumed microvascular ischemic changes, as above.      XR Chest 2 Views   Final Result   IMPRESSION: The heart is mildly enlarged. There is a moderate to large hiatal hernia, as seen on the prior CT. Bibasilar interstitial opacities are noted, favored reflect developing atypical infectious process although superimposed atelectasis may also    be present.      Echocardiogram Complete    (Results Pending)      Report per  radiology    Laboratory:  Labs Ordered and Resulted from Time of ED Arrival to Time of ED Departure   CBC WITH PLATELETS - Abnormal       Result Value    WBC Count 17.6 (*)     RBC Count 4.19      Hemoglobin 12.4      Hematocrit 39.8      MCV 95      MCH 29.6      MCHC 31.2 (*)     RDW 13.8      Platelet Count 271     BASIC METABOLIC PANEL - Abnormal    Sodium 136      Potassium 4.3      Chloride 98      Carbon Dioxide (CO2) 21 (*)     Anion Gap 17 (*)     Urea Nitrogen 26.0 (*)     Creatinine 1.13 (*)     Calcium 9.6      Glucose 327 (*)     GFR Estimate 47 (*)    TROPONIN T, HIGH SENSITIVITY - Abnormal    Troponin T, High Sensitivity 29 (*)    ISTAT GASES LACTATE VENOUS POCT - Abnormal    Lactic Acid POCT 6.5 (*)     Bicarbonate Venous POCT 20 (*)     O2 Sat, Venous POCT 27 (*)     pCO2V Venous POCT 54 (*)     pH Venous POCT 7.18 (*)     pO2 Venous POCT 23 (*)    HEPATIC FUNCTION PANEL - Abnormal    Protein Total 7.2      Albumin 3.7      Bilirubin Total 0.6      Alkaline Phosphatase 115 (*)     AST 32      ALT 21      Bilirubin Direct <0.20     ROUTINE UA WITH MICROSCOPIC - Abnormal    Color Urine Yellow      Appearance Urine Clear      Glucose Urine Negative      Bilirubin Urine Negative      Ketones Urine Negative      Specific Gravity Urine 1.021      Blood Urine Negative      pH Urine 5.5      Protein Albumin Urine 50 (*)     Urobilinogen Urine Normal      Nitrite Urine Negative      Leukocyte Esterase Urine Negative      Mucus Urine Present (*)     RBC Urine 0      WBC Urine <1      Squamous Epithelials Urine <1      Hyaline Casts Urine 4 (*)    ISTAT GASES LACTATE VENOUS POCT - Abnormal    Lactic Acid POCT 6.9 (*)     Bicarbonate Venous POCT 19 (*)     O2 Sat, Venous POCT 36 (*)     pCO2V Venous POCT 53 (*)     pH Venous POCT 7.16 (*)     pO2 Venous POCT 27     ISTAT GASES LACTATE VENOUS POCT - Abnormal    Lactic Acid POCT 7.0 (*)     Bicarbonate Venous POCT 18 (*)     O2 Sat, Venous POCT 50 (*)     pCO2V  Venous POCT 51 (*)     pH Venous POCT 7.15 (*)     pO2 Venous POCT 34     LACTIC ACID WHOLE BLOOD - Abnormal    Lactic Acid 7.8 (*)    INFLUENZA A/B & SARS-COV2 PCR MULTIPLEX - Normal    Influenza A PCR Negative      Influenza B PCR Negative      RSV PCR Negative      SARS CoV2 PCR Negative     CK TOTAL   GLUCOSE MONITOR NURSING POCT   GLUCOSE MONITOR NURSING POCT   GLUCOSE MONITOR NURSING POCT   ENTERIC BACTERIA AND VIRUS PANEL BY BUDDY STOOL   C. DIFFICILE TOXIN B PCR WITH REFLEX TO C. DIFFICILE ANTIGEN AND TOXINS A/B EIA      Emergency Department Course & Assessments:     Interventions:  Medications   levofloxacin (LEVAQUIN) infusion 750 mg (750 mg Intravenous New Bag 1/6/23 2335)   0.9% sodium chloride BOLUS (has no administration in time range)   0.9% sodium chloride BOLUS (0 mLs Intravenous Stopped 1/6/23 2335)   ondansetron (ZOFRAN) injection 4 mg (4 mg Intravenous Given 1/6/23 2212)   0.9% sodium chloride BOLUS (1,000 mLs Intravenous New Bag 1/6/23 2334)          Consultations/Discussion of Management or Tests:     ED Course as of 01/07/23 0427   Fri Jan 06, 2023   2237 I obtained history and examined the patient as noted above     Sat Jan 07, 2023   0251 I spoke with Dr. Pearson of the hospitalist service, who agreed to admit the patient.      I spoke with general surgery regarding CT findings.         Disposition:  The patient was admitted to the hospital under the care of Dr. Pearson.     Impression & Plan    Medical Decision Making:  This is a 87-year-old female who presents with vomiting, syncope, apparent prehospital hypotension with recurrence in the ED, and apparent mild infectious cephalopathy.  She is found to be in apparent septic shock with concern for pneumonia as predominant source but prominent GI symptoms as well and colitis noted on CT.  She was aggressively resuscitated after transient hypotension emergency department and with notable elevated lactic acid consistent with septic shock.   Broad-spectrum antibiotics were initiated initially with Levaquin, Flagyl added later after CT findings.  Blood pressures and heart rate have stabilized for several hours despite persistently high and rising lactic acid.  Patient has no abdominal pain or tenderness and has essentially clear mental status on serial rechecks.  Discussed patient with general surgery who agrees this is highly atypical for intestinal ischemia.  Metformin could also drive process and lactic acidemia.  Nevertheless, the patient will be admitted ICU with plan for ongoing close monitoring, broad-spectrum antibiotics, surgical consultation, and resuscitation.  With fluid resuscitation, the patient has developed a small oxygen requirement of 2 L but this unlabored breathing and clear mental status on final recheck.  Family is aware of the above discussed diagnoses and potential worrisome and poor prognosis associated with possible intestinal ischemia.    Diagnosis:    ICD-10-CM    1. Septic shock (H)  A41.9     R65.21       2. Pneumonia of both lower lobes due to infectious organism  J18.9       3. Lactic acidemia  E87.20       4. Infectious encephalopathy  G93.49     B99.9         Critical care time: independent of procedures, was 35 minutes.       Scribe Disclosure:  I, Torin Deng MD, am serving as a scribe at 10:32 PM on 1/6/2023 to document services personally performed by Torin Deng MD based on my observations and the provider's statements to me.     1/6/2023   Torin Deng MD Houghland, John Eric, MD  01/07/23 0431

## 2023-01-07 NOTE — ED NOTES
Pt vomited and was incont of stool. No blood in emesis or stool. Pericare performed and position changed. Dr. Deng updated.

## 2023-01-08 ENCOUNTER — APPOINTMENT (OUTPATIENT)
Dept: OCCUPATIONAL THERAPY | Facility: CLINIC | Age: 88
DRG: 871 | End: 2023-01-08
Attending: INTERNAL MEDICINE
Payer: COMMERCIAL

## 2023-01-08 LAB
ANION GAP SERPL CALCULATED.3IONS-SCNC: 7 MMOL/L (ref 7–15)
BUN SERPL-MCNC: 24.9 MG/DL (ref 8–23)
CALCIUM SERPL-MCNC: 8.3 MG/DL (ref 8.8–10.2)
CHLORIDE SERPL-SCNC: 108 MMOL/L (ref 98–107)
CREAT SERPL-MCNC: 0.84 MG/DL (ref 0.51–0.95)
DEPRECATED HCO3 PLAS-SCNC: 24 MMOL/L (ref 22–29)
ERYTHROCYTE [DISTWIDTH] IN BLOOD BY AUTOMATED COUNT: 14 % (ref 10–15)
GFR SERPL CREATININE-BSD FRML MDRD: 67 ML/MIN/1.73M2
GLUCOSE BLDC GLUCOMTR-MCNC: 107 MG/DL (ref 70–99)
GLUCOSE BLDC GLUCOMTR-MCNC: 118 MG/DL (ref 70–99)
GLUCOSE BLDC GLUCOMTR-MCNC: 120 MG/DL (ref 70–99)
GLUCOSE BLDC GLUCOMTR-MCNC: 123 MG/DL (ref 70–99)
GLUCOSE BLDC GLUCOMTR-MCNC: 160 MG/DL (ref 70–99)
GLUCOSE SERPL-MCNC: 131 MG/DL (ref 70–99)
HCT VFR BLD AUTO: 29.4 % (ref 35–47)
HGB BLD-MCNC: 9.4 G/DL (ref 11.7–15.7)
MCH RBC QN AUTO: 29.7 PG (ref 26.5–33)
MCHC RBC AUTO-ENTMCNC: 32 G/DL (ref 31.5–36.5)
MCV RBC AUTO: 93 FL (ref 78–100)
PLATELET # BLD AUTO: 206 10E3/UL (ref 150–450)
POTASSIUM SERPL-SCNC: 4.6 MMOL/L (ref 3.4–5.3)
RBC # BLD AUTO: 3.17 10E6/UL (ref 3.8–5.2)
SODIUM SERPL-SCNC: 139 MMOL/L (ref 136–145)
WBC # BLD AUTO: 11.8 10E3/UL (ref 4–11)

## 2023-01-08 PROCEDURE — 36415 COLL VENOUS BLD VENIPUNCTURE: CPT | Performed by: INTERNAL MEDICINE

## 2023-01-08 PROCEDURE — 97165 OT EVAL LOW COMPLEX 30 MIN: CPT | Mod: GO

## 2023-01-08 PROCEDURE — 250N000013 HC RX MED GY IP 250 OP 250 PS 637: Performed by: INTERNAL MEDICINE

## 2023-01-08 PROCEDURE — 120N000001 HC R&B MED SURG/OB

## 2023-01-08 PROCEDURE — 80048 BASIC METABOLIC PNL TOTAL CA: CPT | Performed by: INTERNAL MEDICINE

## 2023-01-08 PROCEDURE — 99233 SBSQ HOSP IP/OBS HIGH 50: CPT | Performed by: INTERNAL MEDICINE

## 2023-01-08 PROCEDURE — 250N000011 HC RX IP 250 OP 636: Performed by: INTERNAL MEDICINE

## 2023-01-08 PROCEDURE — 97535 SELF CARE MNGMENT TRAINING: CPT | Mod: GO

## 2023-01-08 PROCEDURE — 85027 COMPLETE CBC AUTOMATED: CPT | Performed by: INTERNAL MEDICINE

## 2023-01-08 RX ADMIN — OXYCODONE HYDROCHLORIDE 10 MG: 10 TABLET, FILM COATED, EXTENDED RELEASE ORAL at 21:47

## 2023-01-08 RX ADMIN — LATANOPROST 1 DROP: 50 SOLUTION/ DROPS OPHTHALMIC at 21:48

## 2023-01-08 RX ADMIN — HYDROCODONE BITARTRATE AND ACETAMINOPHEN 1 TABLET: 5; 325 TABLET ORAL at 20:33

## 2023-01-08 RX ADMIN — PREGABALIN 150 MG: 75 CAPSULE ORAL at 14:52

## 2023-01-08 RX ADMIN — METRONIDAZOLE 500 MG: 500 INJECTION, SOLUTION INTRAVENOUS at 17:37

## 2023-01-08 RX ADMIN — PREGABALIN 150 MG: 75 CAPSULE ORAL at 20:33

## 2023-01-08 RX ADMIN — INSULIN ASPART 6 UNITS: 100 INJECTION, SOLUTION INTRAVENOUS; SUBCUTANEOUS at 17:37

## 2023-01-08 RX ADMIN — LEVOFLOXACIN 750 MG: 750 INJECTION, SOLUTION INTRAVENOUS at 21:48

## 2023-01-08 RX ADMIN — CITALOPRAM HYDROBROMIDE 20 MG: 20 TABLET ORAL at 10:25

## 2023-01-08 RX ADMIN — LEVOTHYROXINE SODIUM 112 MCG: 0.11 TABLET ORAL at 07:05

## 2023-01-08 RX ADMIN — METRONIDAZOLE 500 MG: 500 INJECTION, SOLUTION INTRAVENOUS at 02:37

## 2023-01-08 RX ADMIN — METRONIDAZOLE 500 MG: 500 INJECTION, SOLUTION INTRAVENOUS at 10:35

## 2023-01-08 RX ADMIN — PREGABALIN 150 MG: 75 CAPSULE ORAL at 10:20

## 2023-01-08 RX ADMIN — SIMVASTATIN 20 MG: 20 TABLET, FILM COATED ORAL at 21:47

## 2023-01-08 RX ADMIN — HYDROCODONE BITARTRATE AND ACETAMINOPHEN 1 TABLET: 5; 325 TABLET ORAL at 10:20

## 2023-01-08 RX ADMIN — ASPIRIN 81 MG: 81 TABLET, COATED ORAL at 10:20

## 2023-01-08 RX ADMIN — GABAPENTIN 300 MG: 300 CAPSULE ORAL at 21:47

## 2023-01-08 ASSESSMENT — ACTIVITIES OF DAILY LIVING (ADL)
ADLS_ACUITY_SCORE: 28
ADLS_ACUITY_SCORE: 30
ADLS_ACUITY_SCORE: 30
ADLS_ACUITY_SCORE: 28
ADLS_ACUITY_SCORE: 30
ADLS_ACUITY_SCORE: 28
ADLS_ACUITY_SCORE: 28
ADLS_ACUITY_SCORE: 30

## 2023-01-08 NOTE — PLAN OF CARE
End of Shift Summary  For vital signs and complete assessments, please see documentation flowsheets.     Pertinent assessments: Pt A&OX4. Weaned to RA. Some trouble w/ word finding at times. One small stool, loose. Up w/ x1 assist, GB, walker.   Major Shift Events: none  Treatment Plan: IV fluid, IV flagyl & levaquin. BG check q4hrs, has not needed coverage this shift. NPO x meds & ice. Lives at home independent, PT recs TCU at discharge, SW assisting w/ placement.

## 2023-01-08 NOTE — PLAN OF CARE
Goal Outcome Evaluation:       End of Shift Summary  For vital signs and complete assessments, please see documentation flowsheets.     Pertinent assessments: Pt A&OX4. Vss and on 1L. Had some pain and managed with lake meds. LS dim and no sob noted. Pt transported from ICU at 1915.  No diarrhea noted this shift. Tele reading SR. , 103.    Major Shift Events Admitted to the unit    Treatment Plan: IV fluid, IV flagyl, iv ABX, monitor for symptoms BG check

## 2023-01-08 NOTE — PROGRESS NOTES
"   01/08/23 3768   Appointment Info   Signing Clinician's Name / Credentials (OT) Teresa Balbir, OTR/L   Living Environment   People in Home alone   Current Living Arrangements house   Home Accessibility stairs to enter home;stairs within home   Number of Stairs, Main Entrance 6   Stair Railings, Main Entrance railings safe and in good condition   Number of Stairs, Within Home, Primary six   Stair Railings, Within Home, Primary railings safe and in good condition   Transportation Anticipated family or friend will provide   Living Environment Comments multiple level house, tub shower combo with grab bars. 4 children live nearby who assist daily   Self-Care   Usual Activity Tolerance moderate   Current Activity Tolerance fair   Equipment Currently Used at Home cane, straight;shower chair;walker, rolling   Activity/Exercise/Self-Care Comment reports ind with ADLs, daughter assist with showering.   Instrumental Activities of Daily Living (IADL)   IADL Comments She has a life alert button. A with medication set up. family brings over meals. Does not drive   General Information   Onset of Illness/Injury or Date of Surgery 01/06/23   Referring Physician Reed Pearson MD   Patient/Family Therapy Goal Statement (OT) get back to house   Additional Occupational Profile Info/Pertinent History of Current Problem Trisha Lucia is a 87 year old female presents with syncope. The patient's sons state that the patient was experiencing nausea this evening around 8:30PM. The patient was home and slept all day. She had only consumed a burger around 6/6:30PM. The patient started vomiting and lost consciousness twice on the toilet. Her family members were there to assist her at the time. They report that the patient did not fall or or hit her head. One of the patient's sons states that her blood oxygen saturation continuously dropped from 90-70% using their home oximeter. They also note that her \"BP was around 30\". One of " "her sons describe her state at that time to be \"white as a sheet\" along with her \"toes turning purple and finger tips feeling cold\". The patient and her sons endorse that she has a dry nonproductive cough along with generalized weakness. The patient denies any medication change and abdominal pain.   Existing Precautions/Restrictions fall   Limitations/Impairments safety/cognitive   Cognitive Status Examination   Orientation Status orientation to person, place and time   Cognitive Status Comments word finding difficulty, repeats self throughout session   Visual Perception   Visual Impairment/Limitations corrective lenses full-time   Pain Assessment   Patient Currently in Pain No   Range of Motion Comprehensive   General Range of Motion no range of motion deficits identified   Strength Comprehensive (MMT)   General Manual Muscle Testing (MMT) Assessment no strength deficits identified   Bed Mobility   Bed Mobility supine-sit;sit-supine   Supine-Sit [place] (Bed Mobility) minimum assist (75% patient effort)   Sit-Supine [place] (Bed Mobility) minimum assist (75% patient effort)   Activities of Daily Living   BADL Assessment/Intervention toileting;lower body dressing;grooming   Lower Body Dressing Assessment/Training   [place] Level (Lower Body Dressing) minimum assist (75% patient effort)   Grooming Assessment/Training   [place] Level (Grooming) contact guard assist   Comment, (Grooming) 3 minutes, cueing to use sink   Toileting   [place] Level (Toileting) minimum assist (75% patient effort)   Clinical Impression   Criteria for Skilled Therapeutic Interventions Met (OT) Yes, treatment indicated   OT Diagnosis decreased ADLs   Influenced by the following impairments safety awareness, decreased strength, poor balance   OT Problem List-Impairments impacting ADL problems related to;activity tolerance impaired;range of motion (ROM);cognition   ADL comments/analysis LB dressing, toileting, showering, " IADLs   Assessment of Occupational Performance 3-5 Performance Deficits   Planned Therapy Interventions (OT) ADL retraining;IADL retraining;progressive activity/exercise   Clinical Decision Making Complexity (OT) low complexity   Risk & Benefits of therapy have been explained evaluation/treatment results reviewed;care plan/treatment goals reviewed;risks/benefits reviewed;participants included;patient   OT Total Evaluation Time   OT Eval, Low Complexity Minutes (11757) 10   OT Goals   Therapy Frequency (OT) 4 times/wk   OT Predicted Duration/Target Date for Goal Attainment 01/12/23   OT Goals Upper Body Dressing;Lower Body Dressing;Toilet Transfer/Toileting   OT: Upper Body Dressing Modified independent   OT: Lower Body Dressing Modified independent   OT: Toilet Transfer/Toileting Modified independent;toilet transfer;cleaning and garment management   OT Discharge Planning   OT Plan TB dressing, toileting, standing gh   OT Discharge Recommendation (DC Rec) Transitional Care Facility   OT Rationale for DC Rec Pt performing below baseline impacting safety with ADLs/IADLs. Would benefit from continued IP OT services to increase ind and therapy in TCU prior to discharge home. If discharges home recommend 24 hour assistance and stairs likely would be a barrier to complete ADLs   Total Session Time   Total Session Time (sum of timed and untimed services) 10

## 2023-01-08 NOTE — PLAN OF CARE
Vitals: WNL  Neuro: A/O x3, forgetful  Cardiac: normal rate and rhythm  Resp: on room air  GI: no BM, now on clear liquid diet  : Voiding well  Skin: no pertiennt skin issues  Activity: Up Ax1 with gait belt and walker  Pain: complains of some neuropathic pain mostly on lower extremities    Plan: oral levaquin, IV Flagyl, pending TCU discharge

## 2023-01-08 NOTE — CONSULTS
Care Management Initial Consult    General Information  Assessment completed with: Patient, Children,    Type of CM/SW Visit: Initial Assessment    Primary Care Provider verified and updated as needed: Yes   Readmission within the last 30 days: no previous admission in last 30 days      Reason for Consult: decision-making  Advance Care Planning: Advance Care Planning Reviewed: no concerns identified          Communication Assessment  Patient's communication style: spoken language (English or Bilingual)    Hearing Difficulty or Deaf: no   Wear Glasses or Blind: yes    Cognitive  Cognitive/Neuro/Behavioral: .WDL except  Level of Consciousness: alert  Arousal Level: opens eyes spontaneously  Orientation: disoriented to, place, time  Mood/Behavior: restless  Best Language: 1 - Mild to moderate  Speech: word-finding difficulty    Living Environment:   People in home: alone     Current living Arrangements: house      Able to return to prior arrangements:         Family/Social Support:  Care provided by: self, child(jasmeet)  Provides care for: no one, unable/limited ability to care for self  Marital Status:   Children          Description of Support System: Supportive, Involved    Support Assessment: Adequate family and caregiver support    Current Resources:   Patient receiving home care services: No     Community Resources: None  Equipment currently used at home: cane, straight, shower chair, walker, rolling  Supplies currently used at home:      Employment/Financial:  Employment Status:          Financial Concerns:             Lifestyle & Psychosocial Needs:  Social Determinants of Health     Tobacco Use: Low Risk      Smoking Tobacco Use: Never     Smokeless Tobacco Use: Never     Passive Exposure: Not on file   Alcohol Use: Not on file   Financial Resource Strain: Not on file   Food Insecurity: Not on file   Transportation Needs: Not on file   Physical Activity: Not on file   Stress: Not on file   Social  Connections: Not on file   Intimate Partner Violence: Not on file   Depression: At risk     PHQ-2 Score: 3   Housing Stability: Not on file       Functional Status:  Prior to admission patient needed assistance:   Dependent ADLs:: Ambulation-walker  Dependent IADLs:: Transportation, Medication Management, Meal Preparation, Shopping, Cooking, Cleaning  Assesssment of Functional Status: Not at  functional baseline    Mental Health Status:  Mental Health Status: No Current Concerns       Chemical Dependency Status:  Chemical Dependency Status: No Current Concerns             Values/Beliefs:  Spiritual, Cultural Beliefs, Islam Practices, Values that affect care:                 Additional Information:  Met with patient and then a daughter joined us, discussed recommendation for TCU, patient and daughter agreeable to that, [facility]  Will send referrals and SW to follow for TCU placement.    DARIAN Tran  [phone]

## 2023-01-08 NOTE — PLAN OF CARE
Patient transferred to 5th floor. Personal belongings sent with (glasses, watch, dentures). Report given to RN.

## 2023-01-08 NOTE — PROGRESS NOTES
[facility]    Medicine Progress Note - Hospitalist Service    Date of Admission:  1/6/2023    Assessment & Plan   Colitis, negative for usual microbes    Levaquin and flagyl, denies pain, occasional stool     worry that OxyContin and narco could be risk factor    Appreciate surgery, clears today, follow inflammatory marker     Oral abx possible tomorrow?  Advance diet.       Will discuss w surgery.      Chronic neuropathy    unclear etiology, say necrologist put her on present cocktail continued now by primary care.  She is pain free and mentates well   Perhaps urgent neuro referral at discharge?    Acute hypoxemic respiratory failure: O2 sats in the high 80s, now on high 90s on 2 L via nasal cannula.  Suspect secondary to pneumonia.  Will need to be careful with fluid resuscitation as it was, possible mild edema and small bilateral pleural effusions on CT chest.  -Continuous pulse ox, supplemental oxygen as needed     VERNON: Mild VERNON with creatinine 1.1 up from baseline of 0.8-0.9.  BUN also elevated.  Secondary to hypovolemia in setting of vomiting poor p.o. intake.  She is on lisinopril.  -Hold lisinopril  -Received aggressive fluid resuscitation.  Repeat BMP in the morning     Syncope  HTN  Elevated troponin: 2 brief syncopal episodes while vomiting in the bathroom lasting less than 60 seconds followed by some confusion.  Highly suspect secondary to orthostatic hypotension or vasovagal syncope.  Significant hypovolemic as above and hypotensive initially.  Troponin slightly elevated at 29.  PTA on lisinopril 20 mg daily.  Denies chest pain.  EKG shows NSR with PVCs.  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  It showed age-indeterminate bilateral basal ganglia lacunar type infarcts and possible age-indeterminate  lacunar infarct involving the left midbrain versus artifact.  She did have hypoperfusion which could have led to acute CVA.  Less likely embolic disease in this presentation.  Alternatively this could be old findings as well although they did not know of any previous strokes.  She does take 81 mg aspirin and is on simvastatin.  -Focusing on management of severe sepsis at this time.  Continue her 81 mg aspirin and simvastatin.  Consideration for brain MRI within the next 48 hours when vital signs and labs improve from infection standpoint which I discussed with the patient and her children.     Acute septic encephalopathy: Some confusion noted earlier in the day and then worse following syncopal episodes.  At baseline has had slowly worsening short-term memory loss, but still lives alone and her daughter sets up her pillbox.  Acutely confused mostly secondary to severe sepsis.  Improved some following IV fluids, but still having some word finding difficulties.  Nothing else focal on neuro exam.  Noncontrast head CT as above showed age-indeterminate lacunar type CVAs.     Type II DM: Reportedly lost about 100 pounds in the last 5 years and her A1c has been very well controlled on metformin 1000 mg twice daily.  Does not check blood sugars at home.  Blood glucose elevated at 327 in the ER which I think is elevated due to her acute physiologic stress.  -Hold metformin for lactic acidosis n.p.o. status  -Medium dose sliding scale insulin every 4 hours while n.p.o.     Chronic pain syndrome  Neuropathy: Takes gabapentin 300 mg at bedtime, Lyrica 150 mg 3 times daily, OxyContin 10 mg at bedtime, and 1/2 tablet of 5 mg Norco in the morning and afternoon.  -Resume OxyContin 10 mg at bedtime  -Currently NPO.  Ordered IV Dilaudid 0.3 mg every 2 hours as needed for moderate pain and 0.5 mg every 2 hours as needed for severe pain  -Resume gabapentin Lyrica     Anxiety: Resume citalopram.     Hypothyroidism: Resume  "levothyroxine.     Diet: Clear Liquid Diet    DVT Prophylaxis: Enoxaparin (Lovenox) SQ  Rizvi Catheter: Not present  Lines: None     Cardiac Monitoring: ACTIVE order. Indication: ICU  Code Status: No CPR- Pre-arrest intubation OK      Clinically Significant Risk Factors        # Hyperkalemia: Highest K = 5.4 mmol/L in last 2 days, will monitor as appropriate   # Hypocalcemia: Lowest Ca = 8.3 mg/dL in last 2 days, will monitor and replace as appropriate               # Overweight: Estimated body mass index is 27.29 kg/m  as calculated from the following:    Height as of this encounter: 1.651 m (5' 5\").    Weight as of this encounter: 74.4 kg (164 lb)., PRESENT ON ADMISSION         Disposition Plan      Expected Discharge Date: 01/11/2023                  Adrian Velazquez MD  Hospitalist Service  [facility]  Securely message with [employer] (more info)  Text page via [email or web address] Paging/Directory   ______________________________________________________________________    Interval History   No significant events overnight, fever resolved, no pain, no fever chills pain or diarrhea     Physical Exam   Vital Signs: Temp: 98  F (36.7  C) Temp src: Oral BP: 120/51 Pulse: 66   Resp: 16 SpO2: 96 % O2 Device: Nasal cannula Oxygen Delivery: 1 LPM  Weight: 164 lbs 0 oz    General Appearance: Well developed, looks stated age,  Mentally sharp   Respiratory: CTA  Cardiovascular: RRR  GI: No guarding, pain on palpation, rebound, +bs   Skin: warm and dry   Other: aaox 3    Medical Decision Making       **CLEAR ALL SELECTIONS**      Data     "

## 2023-01-09 ENCOUNTER — APPOINTMENT (OUTPATIENT)
Dept: OCCUPATIONAL THERAPY | Facility: CLINIC | Age: 88
DRG: 871 | End: 2023-01-09
Payer: COMMERCIAL

## 2023-01-09 ENCOUNTER — APPOINTMENT (OUTPATIENT)
Dept: PHYSICAL THERAPY | Facility: CLINIC | Age: 88
DRG: 871 | End: 2023-01-09
Payer: COMMERCIAL

## 2023-01-09 LAB
ALBUMIN SERPL BCG-MCNC: 2.6 G/DL (ref 3.5–5.2)
ALP SERPL-CCNC: 79 U/L (ref 35–104)
ALT SERPL W P-5'-P-CCNC: 11 U/L (ref 10–35)
ANION GAP SERPL CALCULATED.3IONS-SCNC: 9 MMOL/L (ref 7–15)
AST SERPL W P-5'-P-CCNC: 22 U/L (ref 10–35)
BILIRUB SERPL-MCNC: 0.2 MG/DL
BUN SERPL-MCNC: 18.7 MG/DL (ref 8–23)
CALCIUM SERPL-MCNC: 8.6 MG/DL (ref 8.8–10.2)
CHLORIDE SERPL-SCNC: 103 MMOL/L (ref 98–107)
CREAT SERPL-MCNC: 0.73 MG/DL (ref 0.51–0.95)
CRP SERPL-MCNC: 115.6 MG/L
DEPRECATED HCO3 PLAS-SCNC: 26 MMOL/L (ref 22–29)
ERYTHROCYTE [DISTWIDTH] IN BLOOD BY AUTOMATED COUNT: 13.9 % (ref 10–15)
GFR SERPL CREATININE-BSD FRML MDRD: 79 ML/MIN/1.73M2
GLUCOSE BLDC GLUCOMTR-MCNC: 147 MG/DL (ref 70–99)
GLUCOSE BLDC GLUCOMTR-MCNC: 148 MG/DL (ref 70–99)
GLUCOSE BLDC GLUCOMTR-MCNC: 174 MG/DL (ref 70–99)
GLUCOSE BLDC GLUCOMTR-MCNC: 208 MG/DL (ref 70–99)
GLUCOSE BLDC GLUCOMTR-MCNC: 98 MG/DL (ref 70–99)
GLUCOSE BLDC GLUCOMTR-MCNC: 99 MG/DL (ref 70–99)
GLUCOSE SERPL-MCNC: 99 MG/DL (ref 70–99)
HCT VFR BLD AUTO: 27.3 % (ref 35–47)
HGB BLD-MCNC: 8.8 G/DL (ref 11.7–15.7)
MAGNESIUM SERPL-MCNC: 1.2 MG/DL (ref 1.7–2.3)
MCH RBC QN AUTO: 29.7 PG (ref 26.5–33)
MCHC RBC AUTO-ENTMCNC: 32.2 G/DL (ref 31.5–36.5)
MCV RBC AUTO: 92 FL (ref 78–100)
PLATELET # BLD AUTO: 188 10E3/UL (ref 150–450)
POTASSIUM SERPL-SCNC: 4.3 MMOL/L (ref 3.4–5.3)
PROT SERPL-MCNC: 5.3 G/DL (ref 6.4–8.3)
RBC # BLD AUTO: 2.96 10E6/UL (ref 3.8–5.2)
SODIUM SERPL-SCNC: 138 MMOL/L (ref 136–145)
WBC # BLD AUTO: 9.5 10E3/UL (ref 4–11)

## 2023-01-09 PROCEDURE — 80053 COMPREHEN METABOLIC PANEL: CPT | Performed by: INTERNAL MEDICINE

## 2023-01-09 PROCEDURE — 99231 SBSQ HOSP IP/OBS SF/LOW 25: CPT | Performed by: PHYSICIAN ASSISTANT

## 2023-01-09 PROCEDURE — 120N000001 HC R&B MED SURG/OB

## 2023-01-09 PROCEDURE — 250N000013 HC RX MED GY IP 250 OP 250 PS 637: Performed by: INTERNAL MEDICINE

## 2023-01-09 PROCEDURE — 85027 COMPLETE CBC AUTOMATED: CPT | Performed by: INTERNAL MEDICINE

## 2023-01-09 PROCEDURE — 97530 THERAPEUTIC ACTIVITIES: CPT | Mod: GP | Performed by: PHYSICAL THERAPIST

## 2023-01-09 PROCEDURE — 99233 SBSQ HOSP IP/OBS HIGH 50: CPT | Performed by: INTERNAL MEDICINE

## 2023-01-09 PROCEDURE — 250N000011 HC RX IP 250 OP 636

## 2023-01-09 PROCEDURE — 86140 C-REACTIVE PROTEIN: CPT | Performed by: INTERNAL MEDICINE

## 2023-01-09 PROCEDURE — 36415 COLL VENOUS BLD VENIPUNCTURE: CPT | Performed by: INTERNAL MEDICINE

## 2023-01-09 PROCEDURE — 250N000011 HC RX IP 250 OP 636: Performed by: INTERNAL MEDICINE

## 2023-01-09 PROCEDURE — 97535 SELF CARE MNGMENT TRAINING: CPT | Mod: GO

## 2023-01-09 PROCEDURE — 83735 ASSAY OF MAGNESIUM: CPT | Performed by: INTERNAL MEDICINE

## 2023-01-09 PROCEDURE — 97116 GAIT TRAINING THERAPY: CPT | Mod: GP | Performed by: PHYSICAL THERAPIST

## 2023-01-09 RX ORDER — LEVOFLOXACIN 5 MG/ML
750 INJECTION, SOLUTION INTRAVENOUS EVERY 24 HOURS
Status: DISCONTINUED | OUTPATIENT
Start: 2023-01-09 | End: 2023-01-09

## 2023-01-09 RX ORDER — OXYCODONE HYDROCHLORIDE 5 MG/1
5 TABLET ORAL EVERY 6 HOURS PRN
Status: DISCONTINUED | OUTPATIENT
Start: 2023-01-09 | End: 2023-01-10 | Stop reason: HOSPADM

## 2023-01-09 RX ORDER — NIFEDIPINE 30 MG/1
30 TABLET, EXTENDED RELEASE ORAL AT BEDTIME
Status: DISCONTINUED | OUTPATIENT
Start: 2023-01-09 | End: 2023-01-10 | Stop reason: HOSPADM

## 2023-01-09 RX ORDER — LEVOFLOXACIN 5 MG/ML
500 INJECTION, SOLUTION INTRAVENOUS EVERY 24 HOURS
Status: DISCONTINUED | OUTPATIENT
Start: 2023-01-09 | End: 2023-01-10 | Stop reason: HOSPADM

## 2023-01-09 RX ADMIN — PREGABALIN 150 MG: 75 CAPSULE ORAL at 09:24

## 2023-01-09 RX ADMIN — INSULIN ASPART 1 UNITS: 100 INJECTION, SOLUTION INTRAVENOUS; SUBCUTANEOUS at 18:54

## 2023-01-09 RX ADMIN — GABAPENTIN 300 MG: 300 CAPSULE ORAL at 20:47

## 2023-01-09 RX ADMIN — METRONIDAZOLE 500 MG: 500 INJECTION, SOLUTION INTRAVENOUS at 17:21

## 2023-01-09 RX ADMIN — METRONIDAZOLE 500 MG: 500 INJECTION, SOLUTION INTRAVENOUS at 02:04

## 2023-01-09 RX ADMIN — PREGABALIN 150 MG: 75 CAPSULE ORAL at 14:41

## 2023-01-09 RX ADMIN — INSULIN ASPART 1 UNITS: 100 INJECTION, SOLUTION INTRAVENOUS; SUBCUTANEOUS at 14:36

## 2023-01-09 RX ADMIN — METRONIDAZOLE 500 MG: 500 INJECTION, SOLUTION INTRAVENOUS at 10:22

## 2023-01-09 RX ADMIN — LEVOTHYROXINE SODIUM 112 MCG: 0.11 TABLET ORAL at 06:26

## 2023-01-09 RX ADMIN — HYDROCODONE BITARTRATE AND ACETAMINOPHEN 1 TABLET: 5; 325 TABLET ORAL at 09:24

## 2023-01-09 RX ADMIN — ASPIRIN 81 MG: 81 TABLET, COATED ORAL at 09:25

## 2023-01-09 RX ADMIN — PREGABALIN 150 MG: 75 CAPSULE ORAL at 20:47

## 2023-01-09 RX ADMIN — LATANOPROST 1 DROP: 50 SOLUTION/ DROPS OPHTHALMIC at 20:46

## 2023-01-09 RX ADMIN — INSULIN ASPART 2 UNITS: 100 INJECTION, SOLUTION INTRAVENOUS; SUBCUTANEOUS at 10:19

## 2023-01-09 RX ADMIN — LEVOFLOXACIN 500 MG: 500 INJECTION, SOLUTION INTRAVENOUS at 20:57

## 2023-01-09 RX ADMIN — NIFEDIPINE 30 MG: 30 TABLET, FILM COATED, EXTENDED RELEASE ORAL at 20:48

## 2023-01-09 RX ADMIN — SIMVASTATIN 20 MG: 20 TABLET, FILM COATED ORAL at 20:47

## 2023-01-09 RX ADMIN — CITALOPRAM HYDROBROMIDE 20 MG: 20 TABLET ORAL at 09:23

## 2023-01-09 RX ADMIN — INSULIN ASPART 1 UNITS: 100 INJECTION, SOLUTION INTRAVENOUS; SUBCUTANEOUS at 22:29

## 2023-01-09 ASSESSMENT — ACTIVITIES OF DAILY LIVING (ADL)
ADLS_ACUITY_SCORE: 30
ADLS_ACUITY_SCORE: 30
ADLS_ACUITY_SCORE: 26
ADLS_ACUITY_SCORE: 30
ADLS_ACUITY_SCORE: 26
ADLS_ACUITY_SCORE: 30
ADLS_ACUITY_SCORE: 26
ADLS_ACUITY_SCORE: 30

## 2023-01-09 NOTE — PLAN OF CARE
Goal Outcome Evaluation:       End of Shift Summary  For vital signs and complete assessments, please see documentation flowsheets.     Pertinent assessments: Pt A&OX4. Vss and on 1l NC. Denied pain this shift. Diet advanced to clear  and pt tolerated and no nausea or abd discomfort. No loose stool noted. LS clear and no sob noted. Tele reading  SR.  Major Shift Events :None    Treatment Plan: IV Flagyl &levaquin, BG q 4, pain management, pending TCU placement.

## 2023-01-09 NOTE — PROGRESS NOTES
"[facility]   General Surgery Progress Note          Assessment and Plan:   Assessment:   86yo female with multiple medical comorbidities found to have colitis during work up of her abdominal pain. She also has a reducible left inguinal hernia.  WBC 9.5, +flatus/BM      Plan:   -Diet: advance to full liquids  -IV ABX: Levaquin and Flagyl  -transition to oral abx once tolerating full liquids and having more bowel function  -no surgical indications at this time  -following     Seen and agree. Advance diet as tolerated. Surgery will sign off at this time. Please call with any questions or concerns.     Timo Manrique MD          Interval History:   Resting in bed, daughter at bedside. She tells me she has tolerated clears just fine. She passed flatus and had a small BM last night. She denies abdominal pain.         Physical Exam:   Blood pressure 99/55, pulse 105, temperature 99.9  F (37.7  C), temperature source Oral, resp. rate 16, height 1.651 m (5' 5\"), weight 76.3 kg (168 lb 4.8 oz), SpO2 95 %, not currently breastfeeding.    No intake/output data recorded.    General: alert and no apparent distress  Abdomen: soft, ND, +mild LLQ tenderness, +BS          Data:     Recent Labs   Lab Test 01/09/23  0619 01/08/23  0735 01/07/23  0742   HGB 8.8* 9.4* 11.0*   WBC 9.5 11.8* 14.1*          Leonardo Guerin PA-C    "

## 2023-01-09 NOTE — PLAN OF CARE
Goal Outcome Evaluation:  Pertinent assessments: Pt A&O, forgetful. VSS and on 1l NC. Up Ax1 with belt and walker.  BS active. Tele SR. /148, MD messaged to change orders from q4 to ac/hs for blood sugars. Diet advanced from clear to full liquids, tolerating diet well.   Major Shift Events :None  Treatment Plan: IV Flagyl &levaquin, BG q 4, pain management, pending TCU placement.  Bedside Nurse: CHRISTIAN BAE RN

## 2023-01-09 NOTE — PROGRESS NOTES
[facility]    Medicine Progress Note - Hospitalist Service    Date of Admission:  1/6/2023    Assessment & Plan    Barriers to discharge   Have a bm   Stable hgb   Eating regular diet.     Colitis, negative for usual microbes    Levaquin and flagyl, denies pain, occasional stool     worry that OxyContin and narco could be risk factor    Appreciate surgery, clears today, follow inflammatory marker     Oral abx possible tomorrow?  Advance diet.  per surgery    reduced oxy dependence.      Chronic neuropathy   I think this is raynauds      will start nifedipine     Get rylie reflex.     RESOLVED Acute hypoxemic respiratory failure:   No I/Os   Will need to be careful with fluid resuscitation as it was, possible mild edema and small bilateral pleural effusions on CT chest.  -Continuous pulse ox, supplemental oxygen as needed     VERNON: Mild VERNON with creatinine 1.1 up from baseline of 0.8-0.9.  BUN also elevated.  Secondary to hypovolemia in setting of vomiting poor p.o. intake.  stopped -Hold lisinopril  -Received aggressive fluid resuscitation. g     Syncope  HTN  Elevated troponin: 2 brief syncopal episodes while vomiting in the bathroom lasting less than 60 seconds followed by some confusion.  Highly suspect secondary to orthostatic hypotension or vasovagal syncope.  Significant hypovolemic as above and hypotensive initially.  Troponin slightly elevated at 29.  PTA on lisinopril 20 mg daily.  Denies chest pain.  EKG shows NSR with PVCs.  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  It showed age-indeterminate bilateral basal ganglia lacunar type infarcts and possible age-indeterminate lacunar infarct involving the left midbrain versus artifact.  She did have hypoperfusion which could have led to acute CVA.  Less likely embolic  disease in this presentation.  Alternatively this could be old findings as well although they did not know of any previous strokes.  She does take 81 mg aspirin and is on simvastatin.  -Focusing on management of severe sepsis at this time.  Continue her 81 mg aspirin and simvastatin.  Consideration for brain MRI within the next 48 hours when vital signs and labs improve from infection standpoint which I discussed with the patient and her children.     Resolved Acute septic encephalopathy: Some confusion noted earlier in the day and then worse following syncopal episodes.  At baseline has had slowly worsening short-term memory loss, but still lives alone and her daughter sets up her pillbox.  Acutely confused mostly secondary to severe sepsis.  Improved some following IV fluids, but still having some word finding difficulties.  Nothing else focal on neuro exam.  Noncontrast head CT as above showed age-indeterminate lacunar type CVAs.     Type II DM: Reportedly lost about 100 pounds in the last 5 years and her A1c has been very well controlled on metformin 1000 mg twice daily.  Does not check blood sugars at home.  Blood glucose elevated at 327 in the ER which I think is elevated due to her acute physiologic stress.  -Hold metformin for lactic acidosis  Start at discharge    -Medium dose sliding scale insulin every 4 hours while n.p.o.     Chronic pain syndrome  Neuropathy:    1/9 - have d/c'd norco, and made oxy q6 prn.    normallyTakes gabapentin 300 mg at bedtime, Lyrica 150 mg 3 times daily, OxyContin 10 mg at bedtime, and 1/2 tablet of 5 mg Norco in the morning and afternoon.   earlier in admission  IV Dilaudid 0.3 mg every 2 hours as needed for moderate pain and 0.5 mg every 2 hours as needed for severe pain  -Resume gabapentin Lyrica     Anxiety: Resume citalopram.     Hypothyroidism: Resume levothyroxine.        Diet: Clear Liquid Diet    DVT Prophylaxis: none bleeding   Rizvi Catheter: Not present  Lines:  "None         Cardiac Monitoring: ACTIVE order. Indication: ICU  Code Status: No CPR- Pre-arrest intubation OK      Clinically Significant Risk Factors            # Hypomagnesemia: Lowest Mg = 1.2 mg/dL in last 2 days, will replace as needed   # Hypoalbuminemia: Lowest albumin = 2.6 g/dL at 1/9/2023  6:19 AM, will monitor as appropriate           # Overweight: Estimated body mass index is 28.01 kg/m  as calculated from the following:    Height as of this encounter: 1.651 m (5' 5\").    Weight as of this encounter: 76.3 kg (168 lb 4.8 oz)., PRESENT ON ADMISSION         Disposition Plan      Expected Discharge Date: 01/10/2023, 12:00 PM                Adrian Velazquez MD  Hospitalist Service  [facility]  Securely message with [employer] (more info)  Text page via [employer] Paging/Directory   ______________________________________________________________________    Interval History   Benign night  No bloody bm, but no bm   Tolerating clears   Missed family member again      Physical Exam   Vital Signs: Temp: 98.2  F (36.8  C) Temp src: Oral BP: 127/63 Pulse: 63   Resp: 20 SpO2: 98 % O2 Device: Nasal cannula with humidification Oxygen Delivery: 1 LPM  Weight: 168 lbs 4.8 oz        General Appearance:  Well developed, looks stated age,  Mentally sharp   Respiratory: CTA  Cardiovascular: RRR  GI: No guarding, pain on palpation, rebound, +bs   Skin: warm and dry   Other:  aaox 3    {   Data     I have personally reviewed the following data over the past 24 hrs:    9.5  \   8.8 (L)   / 188     138 103 18.7 /  148 (H)   4.3 26 0.73 \       ALT: 11 AST: 22 AP: 79 TBILI: 0.2   ALB: 2.6 (L) TOT PROTEIN: 5.3 (L) LIPASE: N/A       Procal: N/A CRP: 115.60 (H) Lactic Acid: N/A         Imaging results reviewed over the past 24 hrs:   No results found for this or any previous visit (from the past 24 hour(s)).  "

## 2023-01-09 NOTE — PLAN OF CARE
Pertinent assessments: Pt A&O, forgetful. VSS and on 1l NC. Up Ax1 with belt and walker. Denied pain this shift. LS clear and no sob noted. BS active. Small BM x1 this shift. Tele reading. Slept well.   Major Shift Events :None  Treatment Plan: IV Flagyl &levaquin, BG q 4, pain management, pending TCU placement.  Bedside Nurse: Cassandra Stephens RN

## 2023-01-10 ENCOUNTER — APPOINTMENT (OUTPATIENT)
Dept: PHYSICAL THERAPY | Facility: CLINIC | Age: 88
DRG: 871 | End: 2023-01-10
Payer: COMMERCIAL

## 2023-01-10 ENCOUNTER — LAB REQUISITION (OUTPATIENT)
Dept: LAB | Facility: CLINIC | Age: 88
End: 2023-01-10
Payer: COMMERCIAL

## 2023-01-10 VITALS
HEIGHT: 65 IN | TEMPERATURE: 98.2 F | WEIGHT: 150.8 LBS | DIASTOLIC BLOOD PRESSURE: 62 MMHG | BODY MASS INDEX: 25.12 KG/M2 | SYSTOLIC BLOOD PRESSURE: 126 MMHG | OXYGEN SATURATION: 97 % | RESPIRATION RATE: 16 BRPM | HEART RATE: 64 BPM

## 2023-01-10 DIAGNOSIS — Z11.1 ENCOUNTER FOR SCREENING FOR RESPIRATORY TUBERCULOSIS: ICD-10-CM

## 2023-01-10 LAB
ALBUMIN SERPL BCG-MCNC: 2.9 G/DL (ref 3.5–5.2)
ALP SERPL-CCNC: 74 U/L (ref 35–104)
ALT SERPL W P-5'-P-CCNC: 10 U/L (ref 10–35)
ANION GAP SERPL CALCULATED.3IONS-SCNC: 8 MMOL/L (ref 7–15)
AST SERPL W P-5'-P-CCNC: 23 U/L (ref 10–35)
BILIRUB SERPL-MCNC: 0.2 MG/DL
BUN SERPL-MCNC: 9 MG/DL (ref 8–23)
CALCIUM SERPL-MCNC: 8.7 MG/DL (ref 8.8–10.2)
CHLORIDE SERPL-SCNC: 104 MMOL/L (ref 98–107)
CREAT SERPL-MCNC: 0.73 MG/DL (ref 0.51–0.95)
CRP SERPL-MCNC: 50.82 MG/L
DEPRECATED HCO3 PLAS-SCNC: 30 MMOL/L (ref 22–29)
ERYTHROCYTE [DISTWIDTH] IN BLOOD BY AUTOMATED COUNT: 13.6 % (ref 10–15)
GFR SERPL CREATININE-BSD FRML MDRD: 79 ML/MIN/1.73M2
GLUCOSE BLDC GLUCOMTR-MCNC: 104 MG/DL (ref 70–99)
GLUCOSE BLDC GLUCOMTR-MCNC: 105 MG/DL (ref 70–99)
GLUCOSE BLDC GLUCOMTR-MCNC: 137 MG/DL (ref 70–99)
GLUCOSE SERPL-MCNC: 130 MG/DL (ref 70–99)
HCT VFR BLD AUTO: 30.3 % (ref 35–47)
HGB BLD-MCNC: 9.5 G/DL (ref 11.7–15.7)
MCH RBC QN AUTO: 29.1 PG (ref 26.5–33)
MCHC RBC AUTO-ENTMCNC: 31.4 G/DL (ref 31.5–36.5)
MCV RBC AUTO: 93 FL (ref 78–100)
PLATELET # BLD AUTO: 204 10E3/UL (ref 150–450)
POTASSIUM SERPL-SCNC: 4 MMOL/L (ref 3.4–5.3)
PROT SERPL-MCNC: 5.6 G/DL (ref 6.4–8.3)
RBC # BLD AUTO: 3.26 10E6/UL (ref 3.8–5.2)
SODIUM SERPL-SCNC: 142 MMOL/L (ref 136–145)
WBC # BLD AUTO: 6.5 10E3/UL (ref 4–11)

## 2023-01-10 PROCEDURE — 97116 GAIT TRAINING THERAPY: CPT | Mod: GP | Performed by: PHYSICAL THERAPIST

## 2023-01-10 PROCEDURE — 86038 ANTINUCLEAR ANTIBODIES: CPT | Performed by: INTERNAL MEDICINE

## 2023-01-10 PROCEDURE — 99239 HOSP IP/OBS DSCHRG MGMT >30: CPT | Performed by: INTERNAL MEDICINE

## 2023-01-10 PROCEDURE — 250N000013 HC RX MED GY IP 250 OP 250 PS 637: Performed by: INTERNAL MEDICINE

## 2023-01-10 PROCEDURE — 97530 THERAPEUTIC ACTIVITIES: CPT | Mod: GP | Performed by: PHYSICAL THERAPIST

## 2023-01-10 PROCEDURE — 86140 C-REACTIVE PROTEIN: CPT | Performed by: INTERNAL MEDICINE

## 2023-01-10 PROCEDURE — 250N000011 HC RX IP 250 OP 636: Performed by: INTERNAL MEDICINE

## 2023-01-10 PROCEDURE — 85027 COMPLETE CBC AUTOMATED: CPT | Performed by: INTERNAL MEDICINE

## 2023-01-10 PROCEDURE — 80053 COMPREHEN METABOLIC PANEL: CPT | Performed by: INTERNAL MEDICINE

## 2023-01-10 PROCEDURE — 36415 COLL VENOUS BLD VENIPUNCTURE: CPT | Performed by: INTERNAL MEDICINE

## 2023-01-10 RX ORDER — METRONIDAZOLE 500 MG/1
500 TABLET ORAL 3 TIMES DAILY
Qty: 21 TABLET | Refills: 0 | Status: SHIPPED | OUTPATIENT
Start: 2023-01-10 | End: 2023-01-17

## 2023-01-10 RX ORDER — LEVOFLOXACIN 750 MG/1
750 TABLET, FILM COATED ORAL EVERY OTHER DAY
Qty: 4 TABLET | Refills: 0 | Status: SHIPPED | OUTPATIENT
Start: 2023-01-10 | End: 2023-02-14

## 2023-01-10 RX ORDER — ACETAMINOPHEN 325 MG/1
650 TABLET ORAL EVERY 6 HOURS PRN
Qty: 30 TABLET | Refills: 0 | Status: SHIPPED | OUTPATIENT
Start: 2023-01-10 | End: 2024-03-13

## 2023-01-10 RX ORDER — METRONIDAZOLE 500 MG/100ML
500 INJECTION, SOLUTION INTRAVENOUS EVERY 8 HOURS
Qty: 21 ML | Refills: 0 | Status: SHIPPED | OUTPATIENT
Start: 2023-01-10 | End: 2023-01-10

## 2023-01-10 RX ORDER — NIFEDIPINE 30 MG
30 TABLET, EXTENDED RELEASE ORAL AT BEDTIME
Qty: 30 TABLET | Refills: 0 | Status: SHIPPED | OUTPATIENT
Start: 2023-01-10 | End: 2023-02-24

## 2023-01-10 RX ORDER — OXYCODONE HYDROCHLORIDE 5 MG/1
5 TABLET ORAL EVERY 6 HOURS PRN
Qty: 12 TABLET | Refills: 0 | Status: SHIPPED | OUTPATIENT
Start: 2023-01-10 | End: 2023-02-14

## 2023-01-10 RX ADMIN — METRONIDAZOLE 500 MG: 500 INJECTION, SOLUTION INTRAVENOUS at 02:13

## 2023-01-10 RX ADMIN — PREGABALIN 150 MG: 75 CAPSULE ORAL at 08:22

## 2023-01-10 RX ADMIN — ASPIRIN 81 MG: 81 TABLET, COATED ORAL at 08:22

## 2023-01-10 RX ADMIN — LEVOTHYROXINE SODIUM 112 MCG: 0.11 TABLET ORAL at 05:43

## 2023-01-10 RX ADMIN — METRONIDAZOLE 500 MG: 500 INJECTION, SOLUTION INTRAVENOUS at 10:34

## 2023-01-10 RX ADMIN — CITALOPRAM HYDROBROMIDE 20 MG: 20 TABLET ORAL at 08:22

## 2023-01-10 ASSESSMENT — ACTIVITIES OF DAILY LIVING (ADL)
ADLS_ACUITY_SCORE: 30

## 2023-01-10 NOTE — PLAN OF CARE
End of Shift Summary  For vital signs and complete assessments, please see documentation flowsheets.     Pertinent assessments: VSS. Afebrile. LS diminished. BS hypoactive. Pt denies pain and nausea. Full Liquid diet, tolerating well. BG were 104 and 105. PIV - SL. Ax1 with belt and walker to ambulate, pt a little unsteady at times. Tele monitoring - SR, HR 61. Disorientated to situation, forgetful; alarm on bed for safety. Slept well.     Major Shift Events: none    Treatment Plan: Levaquin, Flagyl, Advance and tolerate diet, Encourage activity, Await placement for TCU.

## 2023-01-10 NOTE — PLAN OF CARE
Vitals: WNL  Neuro: A/O x3, forgetful  Cardiac: normal rate and rhythm  Resp: on room air  GI: BMx2, tolerated Full liquid diet  : voiding well  Skin: no pertinent skin issues  Activity: Up Ax1 with gait belt and walker  Pain: states has some neuropathic pain at times on both lower extremities     Plan: IV Levaquin and Flagyl, pending TCU discharge

## 2023-01-10 NOTE — PROGRESS NOTES
Care Management Discharge Note    Discharge Date: 01/10/2023       Discharge Disposition: Transitional Care    Discharge Services:      Discharge DME:  walker    Discharge Transportation: family or friend will provide    Private pay costs discussed: private room/amenity fees    #[id number]      Persons Notified of Discharge Plans: Yes  Patient/Family in Agreement with the Plan:  yes    Handoff Referral Completed: Yes    Additional Information:  CM met with patient at bedside to discuss discharge planning. [facility] has accepted patient, either today or tomorrow, in a shared room.  Son also present. Family will coordinate and provide her transportation to TCU. CM will update [place] when patient is medically ready (tolerating diet ), and cleared by the doctor. Physician was notified about the TCU acceptance.     Update at 1138: Patient's PAS completed.   [id number]    Update at 1345: CM faxed over the physician orders to [facility]. Unable to leave message with [facility].     Jojo Licona RN  Inpatient RN Care Coordinator  [facility]  [phone]    Jojo Licona, RN

## 2023-01-10 NOTE — PLAN OF CARE
Goal Outcome Evaluation:    Assumed care of pt from [id number]. VSS. A&Ox4. Apical pulse regular. Lungs CTA. Bowel sounds active in all quadrants. Tolerating diet. Voiding spontaneously with adequate output. PIV removed, denies pain. Pt d/c at 1415. Pt belongings were sent with them. Medications and signed script were sent to [facility]. Belongings sent home with pt. IV's were removed prior to discharge. Instructions regarding care were completed. Pt transported via car by family.

## 2023-01-10 NOTE — PLAN OF CARE
Physical Therapy Discharge Summary    Reason for therapy discharge:    Discharged to transitional care facility.    Progress towards therapy goal(s). See goals on Care Plan in [facility] electronic health record for goal details.  Goals partially met.  Barriers to achieving goals:   discharge from facility.    Therapy recommendation(s):    Continued therapy is recommended.  Rationale/Recommendations:  TCU to maximize return to PLOF.

## 2023-01-11 LAB — ANA SER QL IF: NEGATIVE

## 2023-01-11 PROCEDURE — P9604 ONE-WAY ALLOW PRORATED TRIP: HCPCS | Mod: ORL | Performed by: GENERAL PRACTICE

## 2023-01-11 PROCEDURE — 86481 TB AG RESPONSE T-CELL SUSP: CPT | Mod: ORL | Performed by: GENERAL PRACTICE

## 2023-01-11 PROCEDURE — 36415 COLL VENOUS BLD VENIPUNCTURE: CPT | Mod: ORL | Performed by: GENERAL PRACTICE

## 2023-01-11 NOTE — DISCHARGE SUMMARY
"[facility]  Hospitalist Discharge Summary      Date of Admission:  1/6/2023  Date of Discharge:  1/10/2023  2:03 PM  Discharging Provider: Adrian Velazquez MD  Discharge Service: Hospitalist Service    Discharge Diagnoses   Colitis NOS (cdiff, normal enteric pathogens negative)   Anemia multifactorial, ? Blood loss from colitis, infflamation /chronic anemia   Hand and foot pain syndrome -- likely raynauds not neuropathy     Started on nifedipine, oxy made short acting and prn.     Follow-ups Needed After Discharge   Follow-up Appointments     Follow Up and recommended labs and tests      With Primary care after discharge, would get CRP, CBC, BMP, magnesium   BP ok on nifedipine for Raynauds,  \"neuropathic Pain\" improved with this   treatment approach   Able to come off narcotics completely?   Bowel function, bleeding and pain             Unresulted Labs Ordered in the Past 30 Days of this Admission     Date and Time Order Name Status Description    1/7/2023  8:49 AM Blood Culture Hand, Left Preliminary     1/7/2023  8:49 AM Blood Culture Arm, Right Preliminary       These results will be followed up by primary or we will call snf is they become postive soon     Discharge Disposition   Discharged to short-term care facility  Condition at discharge: Satisfactory      Hospital Course   Barriers to discharge   Have a bm   Stable hgb   Eating regular diet.     Colitis, negative for usual microbes    Levaquin and flagyl, denies pain, occasional stool     worry that OxyContin and narco could be risk factor    Appreciate surgery, clears today, follow inflammatory marker     diet advanced to regular   discharge on a week of levo q48 and flagyl tid      Chronic neuropathy   I think this is raynauds  Improved, took no oxy the night before discharge    hydrocodone discontinued at discharge     will start nifedipine     Get rylie reflex.     RESOLVED Acute hypoxemic respiratory failure:   No I/Os   Will need " to be careful with fluid resuscitation as it was, possible mild edema and small bilateral pleural effusions on CT chest.  -Continuous pulse ox, supplemental oxygen as needed     VERNON: Mild VERNON with creatinine 1.1 up from baseline of 0.8-0.9.  BUN also elevated.  Secondary to hypovolemia in setting of vomiting poor p.o. intake.  stopped -Hold lisinopril  -Received aggressive fluid resuscitation. g     Syncope  HTN  Elevated troponin: 2 brief syncopal episodes while vomiting in the bathroom lasting less than 60 seconds followed by some confusion.  Highly suspect secondary to orthostatic hypotension or vasovagal syncope.  Significant hypovolemic as above and hypotensive initially.  Troponin slightly elevated at 29.  PTA on lisinopril 20 mg daily.  Denies chest pain.  EKG shows NSR with PVCs.  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  It showed age-indeterminate bilateral basal ganglia lacunar type infarcts and possible age-indeterminate lacunar infarct involving the left midbrain versus artifact.  She did have hypoperfusion which could have led to acute CVA.  Less likely embolic disease in this presentation.  Alternatively this could be old findings as well although they did not know of any previous strokes.  She does take 81 mg aspirin and is on simvastatin.  -Focusing on management of severe sepsis at this time.  Continue her 81 mg aspirin and simvastatin.  Consideration for brain MRI within the next 48 hours when vital signs and labs improve from infection standpoint which I discussed with the patient and her children.     Resolved Acute septic encephalopathy: Some confusion noted earlier in the day and then worse following syncopal episodes.  At baseline has had slowly worsening short-term memory loss, but still lives alone and her daughter  sets up her pillbox.  Acutely confused mostly secondary to severe sepsis.  Improved some following IV fluids, but still having some word finding difficulties.  Nothing else focal on neuro exam.  Noncontrast head CT as above showed age-indeterminate lacunar type CVAs.     Type II DM: Reportedly lost about 100 pounds in the last 5 years and her A1c has been very well controlled on metformin 1000 mg twice daily.  Does not check blood sugars at home.  Blood glucose elevated at 327 in the ER which I think is elevated due to her acute physiologic stress.  -Hold metformin for lactic acidosis  Start at discharge    -Medium dose sliding scale insulin every 4 hours while n.p.o.     Chronic pain syndrome  Neuropathy:    1/9 - have d/c'd norco, and made oxy q6 prn.    normallyTakes gabapentin 300 mg at bedtime, Lyrica 150 mg 3 times daily, OxyContin 10 mg at bedtime, and 1/2 tablet of 5 mg Norco in the morning and afternoon.   earlier in admission  IV Dilaudid 0.3 mg every 2 hours as needed for moderate pain and 0.5 mg every 2 hours as needed for severe pain  -Resume gabapentin Lyrica     Anxiety: Resume citalopram.     Hypothyroidism: Resume levothyroxine.        Diet: Clear Liquid Diet    DVT Prophylaxis: none bleeding   Rizvi Catheter: Not present  Lines: None       Consultations This Hospital Stay   PHYSICAL THERAPY ADULT IP CONSULT  OCCUPATIONAL THERAPY ADULT IP CONSULT  CARE MANAGEMENT / SOCIAL WORK IP CONSULT  SURGERY GENERAL IP CONSULT    Code Status   Prior    Time Spent on this Encounter   I, Adrian Velazquez MD, personally saw the patient today and spent greater than 30 minutes discharging this patient.       Adrian Velazquez MD  [place] [id number] [facility]  [address] [place] [address]  Phone: [phone]  Fax: [phone]  ______________________________________________________________________    Physical Exam   Vital Signs:                    Weight: 150 lbs 12.8 oz  General  "Appearance:    Well developed, looks stated age,  Moves slowly   Mentally sharp   Respiratory: CTA  Cardiovascular: RRR  GI: No guarding, pain on palpation, rebound, +bs   Skin: hands have been cold, today on nifedipine warm   Other:  aaox 3     Primary Care Physician   Berhane Stevens    Discharge Orders      Primary Care - Care Coordination Referral      General info for SNF    Length of Stay Estimate: 2 weeks  Condition at Discharge: improved   Level of care:per your PT   Rehabilitation Potential: exceptional for age   Admission H&P remains valid and up-to-date: {yes  Recent Chemotherapy: {yes  Use Nursing Home Standing Orders: Yes     Follow Up and recommended labs and tests    With Primary care after discharge, would get CRP, CBC, BMP, magnesium   BP ok on nifedipine for Raynauds,  \"neuropathic Pain\" improved with this treatment approach   Able to come off narcotics completely?   Bowel function, bleeding and pain     Activity - Ambulate in hallway    Every shift     Activity - Up with assistive device     Reason for your hospital stay    Frailty due to age and hospitalization     Fall precautions     Diet    Regular diet, no added salt       Significant Results and Procedures   Most Recent 3 CBC's:Recent Labs   Lab Test 01/10/23  0815 01/09/23  0619 01/08/23  0735   WBC 6.5 9.5 11.8*   HGB 9.5* 8.8* 9.4*   MCV 93 92 93    188 206     Most Recent 3 BMP's:Recent Labs   Lab Test 01/10/23  0951 01/10/23  0815 01/10/23  0540 01/09/23  1008 01/09/23  0619 01/08/23  0945 01/08/23  0735   NA  --  142  --   --  138  --  139   POTASSIUM  --  4.0  --   --  4.3  --  4.6   CHLORIDE  --  104  --   --  103  --  108*   CO2  --  30*  --   --  26  --  24   BUN  --  9.0  --   --  18.7  --  24.9*   CR  --  0.73  --   --  0.73  --  0.84   ANIONGAP  --  8  --   --  9  --  7   FRANCESCO  --  8.7*  --   --  8.6*  --  8.3*   * 130* 105*   < > 99   < > 131*    < > = values in this interval not displayed.     Most Recent " 2 LFT's:Recent Labs   Lab Test 01/10/23  0815 01/09/23  0619   AST 23 22   ALT 10 11   ALKPHOS 74 79   BILITOTAL 0.2 0.2     Most Recent 3 INR's:No lab results found.  Most Recent 3 Troponin's:No lab results found.  Most Recent 3 BNP's:No lab results found.  Most Recent Cholesterol Panel:Recent Labs   Lab Test 08/19/20  0000   CHOL 134   LDL 69   HDL 49   TRIG 80     7-Day Micro Results     Collected Updated Procedure Result Status      01/11/2023 0832 01/11/2023 1124 Quantiferon TB Gold Plus Grey Tube [[id number]]   Peripheral Blood    In process Component Value   No component results            01/11/2023 0832 01/11/2023 1124 Quantiferon TB Gold Plus Green Tube [[id number]]   Peripheral Blood    In process Component Value   No component results            01/11/2023 0832 01/11/2023 1124 Quantiferon TB Gold Plus Yellow Tube [[id number]]   Peripheral Blood    In process Component Value   No component results            01/11/2023 0832 01/11/2023 1124 Quantiferon TB Gold Plus Purple Tube [[id number]]   Peripheral Blood    In process Component Value   No component results            01/07/2023 1014 01/11/2023 1332 Blood Culture Hand, Left [[id number]]    Blood from Hand, Left    Preliminary result Component Value   Culture No growth after 4 days  [P]                01/07/2023 1007 01/11/2023 1332 Blood Culture Arm, Right [[id number]]   Blood from Arm, Right    Preliminary result Component Value   Culture No growth after 4 days  [P]                01/07/2023 0350 01/07/2023 1658 Enteric Bacteria and Virus Panel by BUDDY Stool [[id number]]    Stool from Per Rectum    Final result Component Value   Campylobacter group Not Detected   Salmonella species Not Detected   Shigella species Not Detected   Vibrio group Not Detected   Rotavirus Not Detected   Shiga toxin 1 gene Not Detected   Shiga toxin 2 gene Not Detected   Norovirus I and II Not Detected   Yersinia enterocolitica Not Detected            01/07/2023  0350 01/07/2023 1313 C. difficile Toxin B PCR with reflex to C. difficile Antigen and Toxins A/B EIA [[id number]]    Stool from Per Rectum    Final result Component Value   C Difficile Toxin B by PCR Negative   A negative result does not exclude actual disease due to C. difficile and may be due to improper collection, handling and storage of the specimen or the number of organisms in the specimen is below the detection limit of the assay.            01/06/2023 2213 01/06/2023 2300 Symptomatic Influenza A/B & SARS-CoV2 (COVID-19) Virus PCR Multiplex Nasopharyngeal [[id number]]    Swab from Nasopharyngeal    Final result Component Value   Influenza A PCR Negative   Influenza B PCR Negative   RSV PCR Negative   SARS CoV2 PCR Negative   NEGATIVE: SARS-CoV-2 (COVID-19) RNA not detected, presumed negative.                Most Recent TSH and T4:Recent Labs   Lab Test 08/31/22  1637   TSH 0.26*   T4 1.3   ,   Results for orders placed or performed during the hospital encounter of 01/06/23   XR Chest 2 Views    Narrative    EXAM: XR CHEST 2 VIEWS  LOCATION: [facility]  DATE/TIME: 1/6/2023 11:14 PM    INDICATION: cough, weakness  COMPARISON: 6/16/2011      Impression    IMPRESSION: The heart is mildly enlarged. There is a moderate to large hiatal hernia, as seen on the prior CT. Bibasilar interstitial opacities are noted, favored reflect developing atypical infectious process although superimposed atelectasis may also   be present.   CT Head w/o Contrast    Narrative    EXAM: CT HEAD W/O CONTRAST  LOCATION: [facility]  DATE/TIME: 1/6/2023 11:23 PM    INDICATION: Confusion.  COMPARISON: None.  TECHNIQUE: Routine CT Head without IV contrast. Multiplanar reformats. Dose reduction techniques were used.    FINDINGS:  INTRACRANIAL CONTENTS: No intracranial hemorrhage, extraaxial collection, or mass effect.  No acute large territory infarction. Age-indeterminate bilateral basal  ganglia lacunar-type infarctions. Small patchy focus of hypoattenuation identified involving   the left midbrain (image 11, series 3). Mild to moderate presumed chronic small vessel ischemic changes. Mild to moderate generalized volume loss. No hydrocephalus.     VISUALIZED ORBITS/SINUSES/MASTOIDS: No intraorbital abnormality. No paranasal sinus mucosal disease. No middle ear or mastoid effusion.    BONES/SOFT TISSUES: No calvarial fracture. Hyperostosis frontalis interna.      Impression    IMPRESSION:  1.  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 Question artifact versus age-indeterminate lacunar-type infarct involving the left midbrain. If the patient is experiencing an acute, focal or ongoing neurologic deficit, an MRI may   be indicated.  2.  No acute hemorrhage, abnormal extra-axial fluid collection, or mass/mass effect.  3.  Chronic senescent and presumed microvascular ischemic changes, as above.   CT Chest/Abdomen/Pelvis w Contrast    Narrative    EXAM: CT CHEST/ABDOMEN/PELVIS W CONTRAST  LOCATION: [facility]  DATE/TIME: 1/7/2023 1:33 AM    INDICATION: vomiting, syncope, persistent elevated lactic, eval further for PNA vs ischemic gut  COMPARISON: Chest radiographs from 01/06/2023. Abdominal CT from 06/16/2011.  TECHNIQUE: CT scan of the chest, abdomen, and pelvis was performed following injection of IV contrast. Multiplanar reformats were obtained. Dose reduction techniques were used.   CONTRAST: 73mL Isovue 370    FINDINGS:   LUNGS AND PLEURA: Mild thickening of the intralobular septa. Patchy areas of airspace infiltrate in the lower lobes interspersed with foci of subsegmental atelectasis. Very small pleural effusions.    MEDIASTINUM/AXILLAE: Heart size at upper limits normal. No pericardial effusion. Coronary artery calcifications. Large hiatal hernia with fluid-filled intrathoracic stomach. There is also fluid content in the thoracic esophagus consistent  with reflux.   There is a mildly enlarged right hilar lymph node measuring 1.5 cm short axis on image 70 of series 2. There is a mildly enlarged right infrahilar node measuring 11 mm short axis on image 88.     CORONARY ARTERY CALCIFICATION: Severe.    HEPATOBILIARY: Normal.    PANCREAS: Normal.    SPLEEN: Scattered calcified granulomas.    ADRENAL GLANDS: Normal.    KIDNEYS/BLADDER: Benign right upper pole cortical cyst. 2 mm nonobstructing right lower pole stone. No hydronephrosis. Bladder is normal.    BOWEL: Fluid-filled stomach. Fluid content present throughout the small and large bowel. Focal wall thickening of the descending colon with pericolonic edema. Moderate sigmoid diverticulosis. Anastomotic small bowel sutures in the right lower quadrant.   No free air.    LYMPH NODES: Normal.    VASCULATURE: Moderate atherosclerotic change.    PELVIC ORGANS: Small amount of free fluid. Fluid also continues into a small left inguinal hernia.    MUSCULOSKELETAL: Moderate thoracolumbar spine degenerative change.      Impression    IMPRESSION:  1.  Findings consistent with a nonspecific descending colitis.    2.  Large hiatal hernia. Both the intrathoracic and intra-abdominal portions of the stomach are fluid-filled. There is also fluid in the esophagus consistent with reflux.    3.  Bilateral lower lobe infectious or inflammatory infiltrates. Mild interstitial edema with very small pleural effusions.    4.  Severe coronary artery disease.    5.  Colonic diverticulosis.   Echocardiogram Complete     Value    LVEF  65-70%    Narrative    [id number]  [id number]  [id number]  [id number]^LONI^CHRIS^[facility]  Echocardiography Laboratory  [address] [place] [address]     Name: WENDY ROWELL  MRN: [id number]  : 1935  Study Date: 2023 08:58 AM  Age: 87 yrs  Gender: Female  Patient Location: [facility]  Reason For Study: Syncope  Ordering Physician: CHRIS IVEY  JAGJIT  Referring Physician: Berhane Stevens  Performed By: Alexandra Parikh RDCS     BSA: 1.8 m2  Height: 65 in  Weight: 151 lb  HR: 83  BP: 147/73 mmHg  ______________________________________________________________________________  Procedure  Complete Portable Echo Adult.  ______________________________________________________________________________  Interpretation Summary     Sinus rhythm was noted.  Moderate valvular aortic stenosis.( NATE 1.12cm2/MSG 27 mmHg/DI 0.39  Left ventricular systolic function is normal.  The visual ejection fraction is 65-70%.  The left ventricle is normal in size.  The right ventricle is normal in structure, function and size.  Right ventricular systolic pressure is elevated, consistent with moderate  pulmonary hypertension.     No old studies for comparison  ______________________________________________________________________________  Left Ventricle  The left ventricle is normal in size. There is normal left ventricular wall  thickness. Left ventricular systolic function is normal. The visual ejection  fraction is 65-70%. Grade I or early diastolic dysfunction. Diastolic Doppler  findings (E/E' ratio and/or other parameters) suggest left ventricular filling  pressures are normal. No regional wall motion abnormalities noted. There is no  thrombus seen in the left ventricle.     Right Ventricle  The right ventricle is normal in structure, function and size. There is no  mass or thrombus in the right ventricle.     Atria  The left atrium is moderately dilated. Right atrial size is normal. There is  no atrial shunt seen. The left atrial appendage is not well visualized.     Mitral Valve  There is mild mitral annular calcification. There is no mitral regurgitation  noted. There is no mitral valve stenosis.     Tricuspid Valve  Normal tricuspid valve. The right ventricular systolic pressure is elevated at  55.3 mmHg. Right ventricular systolic pressure is elevated, consistent with  moderate  pulmonary hypertension. There is mild (1+) tricuspid regurgitation.  There is no tricuspid stenosis.     Aortic Valve  There is moderate trileaflet aortic sclerosis. No aortic regurgitation is  present. Moderate valvular aortic stenosis.     Pulmonic Valve  The pulmonic valve is normal in structure and function. There is no pulmonic  valvular regurgitation. There is no pulmonic valvular stenosis.     Vessels  The aortic root is normal size. Normal size ascending aorta. The inferior vena  cava is normal. The pulmonary artery is normal size.     Pericardium  The pericardium appears normal. There is no pleural effusion.     Rhythm  Sinus rhythm was noted.  ______________________________________________________________________________  MMode/2D Measurements & Calculations  IVSd: 0.95 cm     LVIDd: 3.7 cm  LVIDs: 2.2 cm  LVPWd: 0.89 cm  FS: 39.3 %  LV mass(C)d: 100.0 grams  LV mass(C)dI: 57.0 grams/m2  Ao root diam: 3.4 cm  LVOT diam: 2.0 cm  LVOT area: 3.1 cm2  LA Volume (BP): 83.4 ml  LA Volume Index (BP): 47.4 ml/m2  RWT: 0.48     Doppler Measurements & Calculations  MV E max lex: 92.1 cm/sec  MV A max lex: 103.0 cm/sec  MV E/A: 0.89  MV max P.3 mmHg  MV mean PG: 3.2 mmHg  MV V2 VTI: 29.3 cm  MVA(VTI): 2.7 cm2  MV dec time: 0.25 sec  Ao V2 max: 340.0 cm/sec  Ao max P.0 mmHg  Ao V2 mean: 244.0 cm/sec  Ao mean P.0 mmHg  Ao V2 VTI: 71.9 cm  NATE(I,D): 1.1 cm2  NATE(V,D): 1.2 cm2  LV V1 max P.0 mmHg  LV V1 max: 132.0 cm/sec  LV V1 VTI: 25.6 cm  SV(LVOT): 80.4 ml  SI(LVOT): 45.8 ml/m2  PA acc time: 0.07 sec  TR max lex: 371.9 cm/sec  TR max P.3 mmHg  AV Lex Ratio (DI): 0.39  NATE Index (cm2/m2): 0.64  E/E' av.8  Lateral E/e': 8.4  Medial E/e': 15.1     ______________________________________________________________________________  Report approved by: Dr. Hardeep Inman 2023 11:21 AM               Discharge Medications   Discharge Medication List as of 1/10/2023  2:02 PM      START taking  these medications    Details   acetaminophen (TYLENOL) 325 MG tablet Take 2 tablets (650 mg) by mouth every 6 hours as needed for mild pain, Disp-30 tablet, R-0, E-Prescribe      levofloxacin (LEVAQUIN) 750 MG tablet Take 1 tablet (750 mg) by mouth every other day, Disp-4 tablet, R-0, E-Prescribe      metroNIDAZOLE (FLAGYL) 500 MG tablet Take 1 tablet (500 mg) by mouth 3 times daily for 7 days, Disp-21 tablet, R-0, E-Prescribe      NIFEdipine ER OSMOTIC (ADALAT CC) 30 MG 24 hr tablet Take 1 tablet (30 mg) by mouth At Bedtime, Disp-30 tablet, R-0, E-PrescribeFor htn and raynauds      oxyCODONE (ROXICODONE) 5 MG tablet Take 1 tablet (5 mg) by mouth every 6 hours as needed, Disp-12 tablet, R-0, Local Print         CONTINUE these medications which have NOT CHANGED    Details   aspirin 81 MG EC tablet Take 1 tablet by mouth daily., 81 mg, Oral, DAILY Starting 6/5/2012, Until Discontinued, Disp-90 tablet, R-3, Historical      citalopram (CELEXA) 20 MG tablet Take 1 tablet (20 mg) by mouth daily, Disp-90 tablet, R-1, E-Prescribe      gabapentin (NEURONTIN) 300 MG capsule Take 1 capsule (300 mg) by mouth every evening, Disp-90 capsule, R-1, E-Prescribe      latanoprost (XALATAN) 0.005 % ophthalmic solution INSTILL 1 DROP INTO EACH EYE AT BEDTIME., Historical      levothyroxine (SYNTHROID/LEVOTHROID) 112 MCG tablet TAKE ONE TABLET BY MOUTH ONE TIME DAILY, Disp-90 tablet, R-1, E-Prescribe      meclizine (ANTIVERT) 25 MG tablet TAKE ONE TABLET BY MOUTH EVERY SIX HOURS AS NEEDED FOR DIZZINESS, Disp-30 tablet,R-0, E-Prescribe      metFORMIN (GLUCOPHAGE) 1000 MG tablet Take 1 tablet (1,000 mg) by mouth 2 times daily (with meals), Disp-180 tablet, R-1, E-Prescribe      Multiple Vitamins-Minerals (SENIOR MULTIVITAMIN PLUS) TABS Take 1 tablet by mouth daily , Historical      pregabalin (LYRICA) 150 MG capsule Take 1 capsule (150 mg) by mouth 3 times daily, Disp-270 capsule, R-1, E-Prescribe      simvastatin (ZOCOR) 20 MG tablet TAKE  1 TABLET (20 MG) BY MOUTH AT BEDTIME, Disp-90 tablet, R-1, E-Prescribe         STOP taking these medications       HYDROcodone-acetaminophen (NORCO) 5-325 MG tablet Comments:   Reason for Stopping:         lisinopril (ZESTRIL) 20 MG tablet Comments:   Reason for Stopping:         metroNIDAZOLE (FLAGYL) 5 mg/mL infusion Comments:   Reason for Stopping:         OXYCONTIN 10 MG 12 hr tablet Comments:   Reason for Stopping:         VITAMIN D, CHOLECALCIFEROL, PO Comments:   Reason for Stopping:             Allergies   Allergies   Allergen Reactions     Cephalexin Other (See Comments) and Itching     Redness and peeling skin     Penicillins      Childhood rxn of red dots     Percocet [Oxycodone-Acetaminophen]      hallucinations     Shellfish Allergy      Sulfa Drugs

## 2023-01-12 LAB
BACTERIA BLD CULT: NO GROWTH
BACTERIA BLD CULT: NO GROWTH
GAMMA INTERFERON BACKGROUND BLD IA-ACNC: 0.07 IU/ML
M TB IFN-G BLD-IMP: NEGATIVE
M TB IFN-G CD4+ BCKGRND COR BLD-ACNC: 8.05 IU/ML
MITOGEN IGNF BCKGRD COR BLD-ACNC: -0.01 IU/ML
MITOGEN IGNF BCKGRD COR BLD-ACNC: -0.04 IU/ML
QUANTIFERON MITOGEN: 8.12 IU/ML
QUANTIFERON NIL TUBE: 0.07 IU/ML
QUANTIFERON TB1 TUBE: 0.03 IU/ML
QUANTIFERON TB2 TUBE: 0.06

## 2023-01-13 ENCOUNTER — LAB REQUISITION (OUTPATIENT)
Dept: LAB | Facility: CLINIC | Age: 88
End: 2023-01-13
Payer: COMMERCIAL

## 2023-01-13 DIAGNOSIS — R52 PAIN, UNSPECIFIED: ICD-10-CM

## 2023-01-13 DIAGNOSIS — Z76.89 PERSONS ENCOUNTERING HEALTH SERVICES IN OTHER SPECIFIED CIRCUMSTANCES: ICD-10-CM

## 2023-01-13 DIAGNOSIS — Z74.2 NEED FOR ASSISTANCE AT HOME AND NO OTHER HOUSEHOLD MEMBER ABLE TO RENDER CARE: ICD-10-CM

## 2023-01-13 DIAGNOSIS — Z11.1 ENCOUNTER FOR SCREENING FOR RESPIRATORY TUBERCULOSIS: ICD-10-CM

## 2023-01-13 DIAGNOSIS — K51.50 LEFT SIDED COLITIS WITHOUT COMPLICATIONS (H): ICD-10-CM

## 2023-01-14 LAB
ERYTHROCYTE [DISTWIDTH] IN BLOOD BY AUTOMATED COUNT: 14 % (ref 10–15)
HCT VFR BLD AUTO: 28.9 % (ref 35–47)
HGB BLD-MCNC: 9.3 G/DL (ref 11.7–15.7)
MCH RBC QN AUTO: 29.3 PG (ref 26.5–33)
MCHC RBC AUTO-ENTMCNC: 32.2 G/DL (ref 31.5–36.5)
MCV RBC AUTO: 91 FL (ref 78–100)
PLATELET # BLD AUTO: 265 10E3/UL (ref 150–450)
RBC # BLD AUTO: 3.17 10E6/UL (ref 3.8–5.2)
WBC # BLD AUTO: 7.9 10E3/UL (ref 4–11)

## 2023-01-14 PROCEDURE — 36415 COLL VENOUS BLD VENIPUNCTURE: CPT | Mod: ORL | Performed by: GENERAL PRACTICE

## 2023-01-14 PROCEDURE — P9604 ONE-WAY ALLOW PRORATED TRIP: HCPCS | Mod: ORL | Performed by: GENERAL PRACTICE

## 2023-01-14 PROCEDURE — 85027 COMPLETE CBC AUTOMATED: CPT | Mod: ORL | Performed by: GENERAL PRACTICE

## 2023-01-15 ENCOUNTER — LAB REQUISITION (OUTPATIENT)
Dept: LAB | Facility: CLINIC | Age: 88
End: 2023-01-15
Payer: COMMERCIAL

## 2023-01-15 DIAGNOSIS — K51.50 LEFT SIDED COLITIS WITHOUT COMPLICATIONS (H): ICD-10-CM

## 2023-01-15 DIAGNOSIS — Z74.2 NEED FOR ASSISTANCE AT HOME AND NO OTHER HOUSEHOLD MEMBER ABLE TO RENDER CARE: ICD-10-CM

## 2023-01-15 DIAGNOSIS — Z11.1 ENCOUNTER FOR SCREENING FOR RESPIRATORY TUBERCULOSIS: ICD-10-CM

## 2023-01-16 LAB
ANION GAP SERPL CALCULATED.3IONS-SCNC: 16 MMOL/L (ref 7–15)
BUN SERPL-MCNC: 8.9 MG/DL (ref 8–23)
CALCIUM SERPL-MCNC: 9.4 MG/DL (ref 8.8–10.2)
CHLORIDE SERPL-SCNC: 104 MMOL/L (ref 98–107)
CREAT SERPL-MCNC: 0.77 MG/DL (ref 0.51–0.95)
CRP SERPL-MCNC: 6.17 MG/L
DEPRECATED HCO3 PLAS-SCNC: 23 MMOL/L (ref 22–29)
GFR SERPL CREATININE-BSD FRML MDRD: 74 ML/MIN/1.73M2
GLUCOSE SERPL-MCNC: 103 MG/DL (ref 70–99)
MAGNESIUM SERPL-MCNC: 1.7 MG/DL (ref 1.7–2.3)
POTASSIUM SERPL-SCNC: 4.1 MMOL/L (ref 3.4–5.3)
SODIUM SERPL-SCNC: 143 MMOL/L (ref 136–145)

## 2023-01-16 PROCEDURE — 86140 C-REACTIVE PROTEIN: CPT | Mod: ORL | Performed by: GENERAL PRACTICE

## 2023-01-16 PROCEDURE — 80048 BASIC METABOLIC PNL TOTAL CA: CPT | Mod: ORL | Performed by: GENERAL PRACTICE

## 2023-01-16 PROCEDURE — 83735 ASSAY OF MAGNESIUM: CPT | Mod: ORL | Performed by: GENERAL PRACTICE

## 2023-01-16 PROCEDURE — 36415 COLL VENOUS BLD VENIPUNCTURE: CPT | Mod: ORL | Performed by: GENERAL PRACTICE

## 2023-01-16 PROCEDURE — P9604 ONE-WAY ALLOW PRORATED TRIP: HCPCS | Mod: ORL | Performed by: GENERAL PRACTICE

## 2023-01-18 ENCOUNTER — LAB REQUISITION (OUTPATIENT)
Dept: LAB | Facility: CLINIC | Age: 88
End: 2023-01-18
Payer: COMMERCIAL

## 2023-01-18 DIAGNOSIS — K51.50 LEFT SIDED COLITIS WITHOUT COMPLICATIONS (H): ICD-10-CM

## 2023-01-19 LAB
BASOPHILS # BLD AUTO: 0 10E3/UL (ref 0–0.2)
BASOPHILS NFR BLD AUTO: 0 %
EOSINOPHIL # BLD AUTO: 0 10E3/UL (ref 0–0.7)
EOSINOPHIL NFR BLD AUTO: 0 %
ERYTHROCYTE [DISTWIDTH] IN BLOOD BY AUTOMATED COUNT: 15.1 % (ref 10–15)
HCT VFR BLD AUTO: 28.1 % (ref 35–47)
HGB BLD-MCNC: 9.2 G/DL (ref 11.7–15.7)
IMM GRANULOCYTES # BLD: 0.1 10E3/UL
IMM GRANULOCYTES NFR BLD: 1 %
LYMPHOCYTES # BLD AUTO: 0.7 10E3/UL (ref 0.8–5.3)
LYMPHOCYTES NFR BLD AUTO: 13 %
MCH RBC QN AUTO: 29.7 PG (ref 26.5–33)
MCHC RBC AUTO-ENTMCNC: 32.7 G/DL (ref 31.5–36.5)
MCV RBC AUTO: 91 FL (ref 78–100)
MONOCYTES # BLD AUTO: 1.2 10E3/UL (ref 0–1.3)
MONOCYTES NFR BLD AUTO: 20 %
NEUTROPHILS # BLD AUTO: 3.7 10E3/UL (ref 1.6–8.3)
NEUTROPHILS NFR BLD AUTO: 66 %
NRBC # BLD AUTO: 0 10E3/UL
NRBC BLD AUTO-RTO: 0 /100
PLATELET # BLD AUTO: 297 10E3/UL (ref 150–450)
RBC # BLD AUTO: 3.1 10E6/UL (ref 3.8–5.2)
WBC # BLD AUTO: 5.7 10E3/UL (ref 4–11)

## 2023-01-19 PROCEDURE — P9604 ONE-WAY ALLOW PRORATED TRIP: HCPCS | Mod: ORL | Performed by: GENERAL PRACTICE

## 2023-01-19 PROCEDURE — 85025 COMPLETE CBC W/AUTO DIFF WBC: CPT | Mod: ORL | Performed by: GENERAL PRACTICE

## 2023-01-19 PROCEDURE — 36415 COLL VENOUS BLD VENIPUNCTURE: CPT | Mod: ORL | Performed by: GENERAL PRACTICE

## 2023-01-31 ENCOUNTER — LAB REQUISITION (OUTPATIENT)
Dept: LAB | Facility: CLINIC | Age: 88
End: 2023-01-31
Payer: COMMERCIAL

## 2023-01-31 DIAGNOSIS — E11.40 TYPE 2 DIABETES MELLITUS WITH DIABETIC NEUROPATHY, UNSPECIFIED (H): ICD-10-CM

## 2023-02-01 LAB — HBA1C MFR BLD: 6.3 %

## 2023-02-01 PROCEDURE — P9604 ONE-WAY ALLOW PRORATED TRIP: HCPCS | Mod: ORL | Performed by: GENERAL PRACTICE

## 2023-02-01 PROCEDURE — 83036 HEMOGLOBIN GLYCOSYLATED A1C: CPT | Mod: ORL | Performed by: GENERAL PRACTICE

## 2023-02-01 PROCEDURE — 36415 COLL VENOUS BLD VENIPUNCTURE: CPT | Mod: ORL | Performed by: GENERAL PRACTICE

## 2023-02-07 ENCOUNTER — TELEPHONE (OUTPATIENT)
Dept: FAMILY MEDICINE | Facility: CLINIC | Age: 88
End: 2023-02-07

## 2023-02-07 NOTE — TELEPHONE ENCOUNTER
Sona nurse at the [facility] called stating Trisha recently moved in there and would like to adjust her Lyrica. Sona stated that Trisha was previously on 150MG TID and while in TCU it was changed to 150MG BID. Trisha wants to go back up on her dosing. LM notifying nurse that our computer still shows the original dose because she has not been seen here since being discharged from the TCU. Advised that Trisha is due for FASTING OV can discuss dosing then.    Sona # [phone]

## 2023-02-08 ENCOUNTER — ALLIED HEALTH/NURSE VISIT (OUTPATIENT)
Dept: FAMILY MEDICINE | Facility: CLINIC | Age: 88
End: 2023-02-08

## 2023-02-08 DIAGNOSIS — E53.9 B-COMPLEX DEFICIENCY: Primary | ICD-10-CM

## 2023-02-08 PROCEDURE — 96372 THER/PROPH/DIAG INJ SC/IM: CPT | Performed by: FAMILY MEDICINE

## 2023-02-08 RX ADMIN — CYANOCOBALAMIN 1000 MCG: 1000 INJECTION, SOLUTION INTRAMUSCULAR; SUBCUTANEOUS at 15:58

## 2023-02-08 NOTE — NURSING NOTE
Chief Complaint   Patient presents with     Imm/Inj     Vitamin b12 injection      The following medication was given:     MEDICATION: Vitamin B12  1000 mcg  ROUTE: IM  SITE: Deltoid - Left  DOSE: 1000 mcg  LOT #: [id number]   :  [employer]  EXPIRATION DATE:  5/1/24   NDC#: 7624-9046-29

## 2023-02-09 ENCOUNTER — TELEPHONE (OUTPATIENT)
Dept: FAMILY MEDICINE | Facility: CLINIC | Age: 88
End: 2023-02-09

## 2023-02-09 NOTE — TELEPHONE ENCOUNTER
Constanza nurse from [facility] called asking for the verbal ok for orders. Seeking PT/OT eval and SN 1x 1 week, then 2x for 2 weeks, then 1x for 4 weeks, and 4 PRN.    I did give verbal ok as [facility] is out of town, pt has appt scheduled for 02/14/23.

## 2023-02-14 ENCOUNTER — OFFICE VISIT (OUTPATIENT)
Dept: FAMILY MEDICINE | Facility: CLINIC | Age: 88
End: 2023-02-14

## 2023-02-14 VITALS
BODY MASS INDEX: 23.13 KG/M2 | WEIGHT: 139 LBS | TEMPERATURE: 97.9 F | RESPIRATION RATE: 20 BRPM | SYSTOLIC BLOOD PRESSURE: 102 MMHG | DIASTOLIC BLOOD PRESSURE: 64 MMHG | HEART RATE: 60 BPM

## 2023-02-14 DIAGNOSIS — D64.9 ANEMIA, UNSPECIFIED TYPE: ICD-10-CM

## 2023-02-14 DIAGNOSIS — N17.9 ACUTE KIDNEY INJURY (H): ICD-10-CM

## 2023-02-14 DIAGNOSIS — I63.9 CEREBROVASCULAR ACCIDENT (CVA), UNSPECIFIED MECHANISM (H): ICD-10-CM

## 2023-02-14 DIAGNOSIS — F41.1 GAD (GENERALIZED ANXIETY DISORDER): ICD-10-CM

## 2023-02-14 DIAGNOSIS — M48.062 SPINAL STENOSIS OF LUMBAR REGION WITH NEUROGENIC CLAUDICATION: ICD-10-CM

## 2023-02-14 DIAGNOSIS — J96.01 ACUTE RESPIRATORY FAILURE WITH HYPOXIA (H): Primary | ICD-10-CM

## 2023-02-14 DIAGNOSIS — E03.9 HYPOTHYROIDISM, UNSPECIFIED TYPE: ICD-10-CM

## 2023-02-14 DIAGNOSIS — E53.9 B-COMPLEX DEFICIENCY: ICD-10-CM

## 2023-02-14 DIAGNOSIS — E78.2 MIXED HYPERLIPIDEMIA: ICD-10-CM

## 2023-02-14 DIAGNOSIS — I10 ESSENTIAL HYPERTENSION, BENIGN: ICD-10-CM

## 2023-02-14 DIAGNOSIS — E11.40 TYPE 2 DIABETES MELLITUS WITH DIABETIC NEUROPATHY, WITHOUT LONG-TERM CURRENT USE OF INSULIN (H): ICD-10-CM

## 2023-02-14 DIAGNOSIS — K52.9 COLITIS: ICD-10-CM

## 2023-02-14 DIAGNOSIS — G62.9 PERIPHERAL POLYNEUROPATHY: ICD-10-CM

## 2023-02-14 DIAGNOSIS — I73.00 RAYNAUD'S DISEASE WITHOUT GANGRENE: ICD-10-CM

## 2023-02-14 LAB
% GRANULOCYTES: 42.6 %
HCT VFR BLD AUTO: 34.9 % (ref 35–47)
HEMOGLOBIN: 11.5 G/DL (ref 11.7–15.7)
LYMPHOCYTES NFR BLD AUTO: 41.9 %
MCH RBC QN AUTO: 30.7 PG (ref 26–33)
MCHC RBC AUTO-ENTMCNC: 33 G/DL (ref 31–36)
MCV RBC AUTO: 93.3 FL (ref 78–100)
MONOCYTES NFR BLD AUTO: 15.5 %
PLATELET COUNT - QUEST: 107 10^9/L (ref 150–375)
RBC # BLD AUTO: 3.74 10*12/L (ref 3.8–5.2)
WBC # BLD AUTO: 6.1 10*9/L (ref 4–11)

## 2023-02-14 PROCEDURE — 85025 COMPLETE CBC W/AUTO DIFF WBC: CPT | Performed by: FAMILY MEDICINE

## 2023-02-14 PROCEDURE — 36415 COLL VENOUS BLD VENIPUNCTURE: CPT | Performed by: FAMILY MEDICINE

## 2023-02-14 PROCEDURE — 99214 OFFICE O/P EST MOD 30 MIN: CPT | Performed by: FAMILY MEDICINE

## 2023-02-14 RX ORDER — CITALOPRAM HYDROBROMIDE 20 MG/1
20 TABLET ORAL DAILY
Qty: 90 TABLET | Refills: 1 | Status: SHIPPED | OUTPATIENT
Start: 2023-02-14 | End: 2023-09-20

## 2023-02-14 RX ORDER — LEVOTHYROXINE SODIUM 112 UG/1
TABLET ORAL
Qty: 90 TABLET | Refills: 1 | Status: SHIPPED | OUTPATIENT
Start: 2023-02-14 | End: 2023-09-20

## 2023-02-14 RX ORDER — PREGABALIN 200 MG/1
200 CAPSULE ORAL 2 TIMES DAILY
Qty: 180 CAPSULE | Refills: 1 | COMMUNITY
Start: 2023-02-14 | End: 2023-09-20

## 2023-02-14 NOTE — NURSING NOTE
Was in the Hospital for a few days. Then went to TCU for 2 weeks. Wants to discuss her restless legs at night and maybe restart gabapentin    Questioned patient about current smoking habits.  Pt. has never smoked.  PULSE regular  My Chart: active  CLASSIFICATION OF OVERWEIGHT AND OBESITY BY BMI                        Obesity Class           BMI(kg/m2)  Underweight                                    < 18.5  Normal                                         18.5-24.9  Overweight                                     25.0-29.9  OBESITY                     I                  30.0-34.9                             II                 35.0-39.9  EXTREME OBESITY             III                >40                            Patient's  BMI Body mass index is 23.13 kg/m .  http://hin.nhlbi.nih.gov/menuplanner/menu.cgi  Pre-visit planning  Immunizations - up to date  Colonoscopy -   Mammogram -   Asthma -   PHQ9 -    SONYA-7 -      [facility]  [phone] fax

## 2023-02-14 NOTE — PROGRESS NOTES
"  Assessment & Plan     Acute respiratory failure with hypoxia (H)  resolved    Anemia, unspecified type  improved nicely since hospital    Raynaud's disease without gangrene  Suggested diagnosis-pt is being tried on nifedipine-may be more neuropathy    Cerebrovascular accident (CVA), unspecified mechanism (H)  Stable, noted on MRI    Colitis  resolved    Acute kidney injury (H)  resolved    Type 2 diabetes mellitus with diabetic neuropathy, without long-term current use of insulin (H)  Well controlled, continue current medications at current doses     Mixed hyperlipidemia  Controlled, continue current medications at current doses     Essential hypertension, benign  Well controlled, continue current medications at current doses     SONYA (generalized anxiety disorder)  Stable, continue current medications at current doses     Hypothyroidism, unspecified type  Stable, continue current medications at current doses     Peripheral polyneuropathy  Pt off narcotics and gabapentin, just Lyrica-ok to add back gabapentin in future if needed    B-complex deficiency      Spinal stenosis of lumbar region with neurogenic claudication        Review of external notes as documented elsewhere in note  Review of the result(s) of each unique test - labs  Ordering of each unique test  Prescription drug management  35 minutes spent on the date of the encounter doing chart review, review of test results, patient visit, documentation and discussion with family      MED REC REQUIRED  Post Medication Reconciliation Status: discharge medications reconciled, continue medications without change  BMI:   Estimated body mass index is 23.13 kg/m  as calculated from the following:    Height as of 1/7/23: 1.651 m (5' 5\").    Weight as of this encounter: 63 kg (139 lb).       FUTURE APPOINTMENTS:       - Follow-up visit in 3 mo  Regular exercise    No follow-ups on file.    Berhane Stevens MD  [facility]    Ara Rico " Johana is a 87 year old accompanied by her son, presenting for the following health issues:  RECHECK      [facility]       Hospital Follow-up Visit:    Hospital/Nursing Home/IP Rehab Facility: [facility]  Date of Admission: 1/6/23, 1/10/23  Date of Discharge: 1/10/23, 1/24/23  Reason(s) for Admission: Colitis NOS (cdiff, normal enteric pathogens negative)   Anemia multifactorial, ? Blood loss from colitis, infflamation /chronic anemia     Was your hospitalization related to COVID-19? No   Problems taking medications regularly:  None  Medication changes since discharge: None  Problems adhering to non-medication therapy:  None    Summary of hospitalization:  [facility] discharge summary reviewed  Diagnostic Tests/Treatments reviewed.  Follow up needed: neurology  Other Healthcare Providers Involved in Patient s Care:         Specialist appointment - neurology and Physical Therapy  Update since discharge: improved.   Plan of care communicated with patient and family     Diabetes Follow-up      How often are you checking your blood sugar? Not at all    What concerns do you have today about your diabetes? None     Do you have any of these symptoms? (Select all that apply)  Numbness in feet and Burning in feet        Hyperlipidemia Follow-Up      Are you regularly taking any medication or supplement to lower your cholesterol?   Yes- simvastatin    Are you having muscle aches or other side effects that you think could be caused by your cholesterol lowering medication?  No    Hypertension Follow-up      Do you check your blood pressure regularly outside of the clinic? Yes     Are you following a low salt diet? No    Are your blood pressures ever more than 140 on the top number (systolic) OR more   than 90 on the bottom number (diastolic), for example 140/90? No    BP Readings from Last 2 Encounters:   02/14/23 102/64   01/10/23 126/62      Hemoglobin A1C (%)   Date Value   02/01/2023 6.3 (H)   08/31/2022 5.9   03/16/2022 6.1     LDL Cholesterol Calculated (mg/dL (calc))   Date Value   01/11/2019 70   12/28/2017 76     LDL Cholesterol Direct (mg/dL)   Date Value   08/19/2020 69   01/07/2020 83       Anxiety Follow-Up    How are you doing with your anxiety since your last visit? No change    Are you having other symptoms that might be associated with anxiety? No    Have you had a significant life event? No     Are you feeling depressed? No    Do you have any concerns with your use of alcohol or other drugs? No    Social History     Tobacco Use     Smoking status: Never     Smokeless tobacco: Never   Substance Use Topics     Alcohol use: No     Alcohol/week: 0.0 standard drinks     Comment: 0     Drug use: No     SONYA-7 SCORE 1/11/2019 8/7/2019 9/20/2021   Total Score 3 3 1     PHQ 8/7/2019 1/7/2020 9/20/2021   PHQ-9 Total Score 6 4 10   Q9: Thoughts of better off dead/self-harm past 2 weeks Not at all Not at all Not at all     Last PHQ-9 9/20/2021   1.  Little interest or pleasure in doing things 2   2.  Feeling down, depressed, or hopeless 1   3.  Trouble falling or staying asleep, or sleeping too much 3   4.  Feeling tired or having little energy 2   5.  Poor appetite or overeating 2   6.  Feeling bad about yourself 0   7.  Trouble concentrating 0   8.  Moving slowly or restless 0   Q9: Thoughts of better off dead/self-harm past 2 weeks 0   PHQ-9 Total Score 10   Difficulty at work, home, or with people Somewhat difficult     SONYA-7  9/20/2021   1. Feeling nervous, anxious, or on edge 0   2. Not being able to stop or control worrying 0   3. Worrying too much about different things 0   4. Trouble relaxing 0   5. Being so restless that it is hard to sit still 0   6. Becoming easily annoyed or irritable 0   7. Feeling afraid, as if something awful might happen 1   SONYA-7 Total Score 1   If you checked any problems, how difficult have they made it for  you to do your work, take care of things at home, or get along with other people? Not difficult at all         How many servings of fruits and vegetables do you eat daily?  2-3    On average, how many sweetened beverages do you drink each day (Examples: soda, juice, sweet tea, etc.  Do NOT count diet or artificially sweetened beverages)?   1    How many days per week do you exercise enough to make your heart beat faster? 3 or less    How many minutes a day do you exercise enough to make your heart beat faster? 10 - 19    How many days per week do you miss taking your medication? 0        Review of Systems   Constitutional, HEENT, cardiovascular, pulmonary, gi and gu systems are negative, except as otherwise noted.      Objective    Wt 63 kg (139 lb)   BMI 23.13 kg/m    Body mass index is 23.13 kg/m .  Physical Exam   GENERAL: healthy, alert and no distress  NECK: no adenopathy, no asymmetry, masses, or scars and thyroid normal to palpation  RESP: lungs clear to auscultation - no rales, rhonchi or wheezes  CV: regular rate and rhythm, normal S1 S2, no S3 or S4, no murmur, click or rub, no peripheral edema and peripheral pulses strong  ABDOMEN: soft, nontender, no hepatosplenomegaly, no masses and bowel sounds normal  MS: no gross musculoskeletal defects noted, no edema  PSYCH: mentation appears normal, affect normal/bright    Lab Requisition on 02/01/2023   Component Date Value Ref Range Status     Hemoglobin A1C 02/01/2023 6.3 (H)  <5.7 % Final    Normal <5.7%   Prediabetes 5.7-6.4%    Diabetes 6.5% or higher     Note: Adopted from ADA consensus guidelines.

## 2023-02-23 DIAGNOSIS — Z76.89 HEALTH CARE HOME: ICD-10-CM

## 2023-02-23 DIAGNOSIS — I73.00 RAYNAUD'S DISEASE WITHOUT GANGRENE: Primary | ICD-10-CM

## 2023-02-24 DIAGNOSIS — E11.40 TYPE 2 DIABETES MELLITUS WITH DIABETIC NEUROPATHY, WITHOUT LONG-TERM CURRENT USE OF INSULIN (H): ICD-10-CM

## 2023-02-24 NOTE — TELEPHONE ENCOUNTER
Trisha Lucia is requesting a refill of:    Pending Prescriptions:                       Disp   Refills    NIFEdipine ER (ADALAT CC) 30 MG 24 hr tab*90 tab*0            Sig: Take 1 tablet (30 mg) by mouth At Bedtime    Please close encounter if RX was sent. Thanks, Destiny

## 2023-02-24 NOTE — TELEPHONE ENCOUNTER
Received a request from [facility] for a refill of Pregabalin.    This should come from Neurology, sent order back  Pt coming for B12. Will have her sign form for [facility] for [facility]

## 2023-02-27 RX ORDER — NIFEDIPINE 30 MG
30 TABLET, EXTENDED RELEASE ORAL AT BEDTIME
Qty: 90 TABLET | Refills: 0 | Status: SHIPPED | OUTPATIENT
Start: 2023-02-27 | End: 2023-09-20

## 2023-03-08 ENCOUNTER — ALLIED HEALTH/NURSE VISIT (OUTPATIENT)
Dept: FAMILY MEDICINE | Facility: CLINIC | Age: 88
End: 2023-03-08

## 2023-03-08 DIAGNOSIS — E53.9 B-COMPLEX DEFICIENCY: Primary | ICD-10-CM

## 2023-03-08 PROCEDURE — 96372 THER/PROPH/DIAG INJ SC/IM: CPT | Performed by: FAMILY MEDICINE

## 2023-03-08 RX ADMIN — CYANOCOBALAMIN 1000 MCG: 1000 INJECTION, SOLUTION INTRAMUSCULAR; SUBCUTANEOUS at 15:53

## 2023-03-08 NOTE — NURSING NOTE
Chief Complaint   Patient presents with     Imm/Inj     Vitamin b12 injection      The following medication was given:     MEDICATION: Vitamin B12  1000 mcg  ROUTE: IM  SITE: Deltoid - Right  DOSE: 1000 mcg  LOT #: [id number]  :  [employer] [place]  EXPIRATION DATE:  5/1/24  NDC#: 6868-8837-48    Haleigh Thrasher CMA

## 2023-03-13 ENCOUNTER — TELEPHONE (OUTPATIENT)
Dept: FAMILY MEDICINE | Facility: CLINIC | Age: 88
End: 2023-03-13

## 2023-03-13 RX ORDER — ACETAMINOPHEN 325 MG/1
650 TABLET ORAL AT BEDTIME
COMMUNITY
Start: 2023-03-13 | End: 2024-09-17

## 2023-04-03 ENCOUNTER — TELEPHONE (OUTPATIENT)
Dept: FAMILY MEDICINE | Facility: CLINIC | Age: 88
End: 2023-04-03

## 2023-04-03 NOTE — TELEPHONE ENCOUNTER
Anahy at [facility] called to inform Dr. Stevens that she had a fall last Friday 3/31/2023. She had a low grade fever of 100.4. No injury. Temp came down. Her daughter Stephanie was informed. No further evaluation needed. Routing to Dr. Stevens to inform.      Anahy's phone # [phone]

## 2023-04-04 ENCOUNTER — ALLIED HEALTH/NURSE VISIT (OUTPATIENT)
Dept: FAMILY MEDICINE | Facility: CLINIC | Age: 88
End: 2023-04-04

## 2023-04-04 DIAGNOSIS — E53.9 B-COMPLEX DEFICIENCY: Primary | ICD-10-CM

## 2023-04-04 PROCEDURE — 96372 THER/PROPH/DIAG INJ SC/IM: CPT | Performed by: FAMILY MEDICINE

## 2023-04-04 RX ADMIN — CYANOCOBALAMIN 1000 MCG: 1000 INJECTION, SOLUTION INTRAMUSCULAR; SUBCUTANEOUS at 14:48

## 2023-04-04 NOTE — NURSING NOTE
The following medication was given:     MEDICATION: Vitamin B12  1000mcg  ROUTE: IM  SITE: Deltoid - Left  DOSE: 1.0 mL  LOT #: [id number]  :  [employer] [place]  EXPIRATION DATE:  5/1/24  NDC#: 1103-6032-90

## 2023-04-17 ENCOUNTER — TELEPHONE (OUTPATIENT)
Dept: FAMILY MEDICINE | Facility: CLINIC | Age: 88
End: 2023-04-17

## 2023-05-02 ENCOUNTER — ALLIED HEALTH/NURSE VISIT (OUTPATIENT)
Dept: FAMILY MEDICINE | Facility: CLINIC | Age: 88
End: 2023-05-02

## 2023-05-02 DIAGNOSIS — E53.9 B-COMPLEX DEFICIENCY: Primary | ICD-10-CM

## 2023-05-02 PROCEDURE — 96372 THER/PROPH/DIAG INJ SC/IM: CPT | Performed by: FAMILY MEDICINE

## 2023-05-02 RX ORDER — CYANOCOBALAMIN 1000 UG/ML
1000 INJECTION, SOLUTION INTRAMUSCULAR; SUBCUTANEOUS
Status: COMPLETED | OUTPATIENT
Start: 2023-05-02 | End: 2023-06-27

## 2023-05-02 RX ADMIN — CYANOCOBALAMIN 1000 MCG: 1000 INJECTION, SOLUTION INTRAMUSCULAR; SUBCUTANEOUS at 14:48

## 2023-05-02 NOTE — NURSING NOTE
Chief Complaint   Patient presents with     Imm/Inj     Vitamin B12 injection      The following medication was given:     MEDICATION: Vitamin B12  1000 mcg  ROUTE: IM  SITE: Deltoid - Right  DOSE: 1000 mcg   LOT #: [id number]  :  [employer] [place]  EXPIRATION DATE:  5/1/24  NDC#: 1816-1478-37

## 2023-05-30 ENCOUNTER — ALLIED HEALTH/NURSE VISIT (OUTPATIENT)
Dept: FAMILY MEDICINE | Facility: CLINIC | Age: 88
End: 2023-05-30

## 2023-05-30 DIAGNOSIS — E53.9 B-COMPLEX DEFICIENCY: Primary | ICD-10-CM

## 2023-05-30 PROCEDURE — 96372 THER/PROPH/DIAG INJ SC/IM: CPT | Performed by: FAMILY MEDICINE

## 2023-05-30 RX ADMIN — CYANOCOBALAMIN 1000 MCG: 1000 INJECTION, SOLUTION INTRAMUSCULAR; SUBCUTANEOUS at 14:37

## 2023-06-10 ENCOUNTER — HEALTH MAINTENANCE LETTER (OUTPATIENT)
Age: 88
End: 2023-06-10

## 2023-06-27 ENCOUNTER — ALLIED HEALTH/NURSE VISIT (OUTPATIENT)
Dept: FAMILY MEDICINE | Facility: CLINIC | Age: 88
End: 2023-06-27

## 2023-06-27 DIAGNOSIS — E53.9 B-COMPLEX DEFICIENCY: Primary | ICD-10-CM

## 2023-06-27 PROCEDURE — 96372 THER/PROPH/DIAG INJ SC/IM: CPT | Performed by: FAMILY MEDICINE

## 2023-06-27 RX ADMIN — CYANOCOBALAMIN 1000 MCG: 1000 INJECTION, SOLUTION INTRAMUSCULAR; SUBCUTANEOUS at 14:42

## 2023-06-27 NOTE — NURSING NOTE
Chief Complaint   Patient presents with     Allied Health Visit     B12 injection     Pt is due for B12 labs and OV with provider at the end of August.

## 2023-07-13 NOTE — PROGRESS NOTES
Surgery Progress Note  1/8/2023    No acute events overnight. Feeling less abdominal pain. In good spirits except that [employer] is losing in their football game. No nausea/vomiting.  Vitals reviewed  Gen:NAD  Abd: mild left quadrant tenderness, improved    A/p:  88yo female with multiple medical comorbidities found to have colitis during work up of her abdominal pain.  -clear diet today, plan to advance with increase of  bowel function  -transition to oral abx once tolerating more of diet and having bowel function   Referred To Plastics For Closure Text (Leave Blank If You Do Not Want): After obtaining clear surgical margins the patient was referred to plastics for surgical repair.  The patient understands they will receive post-surgical care and follow-up from the referring physician's office.

## 2023-07-26 ENCOUNTER — ALLIED HEALTH/NURSE VISIT (OUTPATIENT)
Dept: FAMILY MEDICINE | Facility: CLINIC | Age: 88
End: 2023-07-26

## 2023-07-26 DIAGNOSIS — E53.9 B-COMPLEX DEFICIENCY: Primary | ICD-10-CM

## 2023-07-26 PROCEDURE — 96372 THER/PROPH/DIAG INJ SC/IM: CPT | Performed by: FAMILY MEDICINE

## 2023-07-26 RX ORDER — CYANOCOBALAMIN 1000 UG/ML
1000 INJECTION, SOLUTION INTRAMUSCULAR; SUBCUTANEOUS
Status: COMPLETED | OUTPATIENT
Start: 2023-07-26 | End: 2024-07-03

## 2023-07-26 RX ADMIN — CYANOCOBALAMIN 1000 MCG: 1000 INJECTION, SOLUTION INTRAMUSCULAR; SUBCUTANEOUS at 14:37

## 2023-07-26 NOTE — NURSING NOTE
Pt has seen Neurology on 6/27/23. Had B12 done and also to continue on the injections Per Dr Angela

## 2023-08-19 ENCOUNTER — HEALTH MAINTENANCE LETTER (OUTPATIENT)
Age: 88
End: 2023-08-19

## 2023-08-22 ENCOUNTER — ALLIED HEALTH/NURSE VISIT (OUTPATIENT)
Dept: FAMILY MEDICINE | Facility: CLINIC | Age: 88
End: 2023-08-22

## 2023-08-22 DIAGNOSIS — E53.9 B-COMPLEX DEFICIENCY: Primary | ICD-10-CM

## 2023-08-22 PROCEDURE — 96372 THER/PROPH/DIAG INJ SC/IM: CPT | Performed by: FAMILY MEDICINE

## 2023-08-22 RX ADMIN — CYANOCOBALAMIN 1000 MCG: 1000 INJECTION, SOLUTION INTRAMUSCULAR; SUBCUTANEOUS at 14:29

## 2023-09-20 ENCOUNTER — OFFICE VISIT (OUTPATIENT)
Dept: FAMILY MEDICINE | Facility: CLINIC | Age: 88
End: 2023-09-20

## 2023-09-20 VITALS
WEIGHT: 144 LBS | SYSTOLIC BLOOD PRESSURE: 106 MMHG | DIASTOLIC BLOOD PRESSURE: 62 MMHG | HEART RATE: 56 BPM | TEMPERATURE: 98.1 F | OXYGEN SATURATION: 93 % | BODY MASS INDEX: 23.96 KG/M2

## 2023-09-20 DIAGNOSIS — I73.00 RAYNAUD'S DISEASE WITHOUT GANGRENE: ICD-10-CM

## 2023-09-20 DIAGNOSIS — E11.40 TYPE 2 DIABETES MELLITUS WITH DIABETIC NEUROPATHY, WITHOUT LONG-TERM CURRENT USE OF INSULIN (H): Primary | ICD-10-CM

## 2023-09-20 DIAGNOSIS — F41.1 GAD (GENERALIZED ANXIETY DISORDER): ICD-10-CM

## 2023-09-20 DIAGNOSIS — E78.2 MIXED HYPERLIPIDEMIA: ICD-10-CM

## 2023-09-20 DIAGNOSIS — E53.9 B-COMPLEX DEFICIENCY: ICD-10-CM

## 2023-09-20 DIAGNOSIS — G62.9 SMALL FIBER NEUROPATHY: ICD-10-CM

## 2023-09-20 DIAGNOSIS — E03.9 HYPOTHYROIDISM, UNSPECIFIED TYPE: ICD-10-CM

## 2023-09-20 DIAGNOSIS — I10 ESSENTIAL HYPERTENSION, BENIGN: ICD-10-CM

## 2023-09-20 LAB
% GRANULOCYTES: 60.5 %
ALBUMIN SERPL-MCNC: 4.1 G/DL (ref 3.6–5.1)
ALBUMIN/GLOB SERPL: 1.6 {RATIO} (ref 1–2.5)
ALP SERPL-CCNC: 51 U/L (ref 33–130)
ALT 1742-6: 6 U/L (ref 0–32)
AST 1920-8: 12 U/L (ref 0–35)
BILIRUB SERPL-MCNC: 0.4 MG/DL (ref 0.2–1.2)
BUN SERPL-MCNC: 18 MG/DL (ref 7–25)
BUN/CREATININE RATIO: 18.4 (ref 6–32)
CALCIUM SERPL-MCNC: 9.4 MG/DL (ref 8.6–10.3)
CHLORIDE SERPLBLD-SCNC: 104.7 MMOL/L (ref 98–110)
CO2 SERPL-SCNC: 29.4 MMOL/L (ref 20–32)
CREAT SERPL-MCNC: 0.98 MG/DL (ref 0.6–1.3)
GLOBULIN, CALCULATED - QUEST: 2.5 (ref 1.9–3.7)
GLUCOSE SERPL-MCNC: 135 MG/DL (ref 60–99)
HBA1C MFR BLD: 5.9 % (ref 4–7)
HCT VFR BLD AUTO: 34.8 % (ref 35–47)
HEMOGLOBIN: 11 G/DL (ref 11.7–15.7)
LYMPHOCYTES NFR BLD AUTO: 28.8 %
MCH RBC QN AUTO: 27.2 PG (ref 26–33)
MCHC RBC AUTO-ENTMCNC: 31.6 G/DL (ref 31–36)
MCV RBC AUTO: 86.2 FL (ref 78–100)
MONOCYTES NFR BLD AUTO: 10.7 %
PLATELET COUNT - QUEST: 214 10^9/L (ref 150–375)
POTASSIUM SERPL-SCNC: 4.97 MMOL/L (ref 3.5–5.3)
PROT SERPL-MCNC: 6.6 G/DL (ref 6.1–8.1)
RBC # BLD AUTO: 4.04 10*12/L (ref 3.8–5.2)
SODIUM SERPL-SCNC: 140.3 MMOL/L (ref 135–146)
WBC # BLD AUTO: 5.2 10*9/L (ref 4–11)

## 2023-09-20 PROCEDURE — 96372 THER/PROPH/DIAG INJ SC/IM: CPT | Performed by: FAMILY MEDICINE

## 2023-09-20 PROCEDURE — 85025 COMPLETE CBC W/AUTO DIFF WBC: CPT | Performed by: FAMILY MEDICINE

## 2023-09-20 PROCEDURE — 80053 COMPREHEN METABOLIC PANEL: CPT | Performed by: FAMILY MEDICINE

## 2023-09-20 PROCEDURE — 36415 COLL VENOUS BLD VENIPUNCTURE: CPT | Performed by: FAMILY MEDICINE

## 2023-09-20 PROCEDURE — 83036 HEMOGLOBIN GLYCOSYLATED A1C: CPT | Performed by: FAMILY MEDICINE

## 2023-09-20 PROCEDURE — 99214 OFFICE O/P EST MOD 30 MIN: CPT | Mod: 25 | Performed by: FAMILY MEDICINE

## 2023-09-20 RX ORDER — SIMVASTATIN 20 MG
TABLET ORAL
Qty: 90 TABLET | Refills: 1 | Status: SHIPPED | OUTPATIENT
Start: 2023-09-20 | End: 2024-03-13

## 2023-09-20 RX ORDER — LEVOTHYROXINE SODIUM 112 UG/1
TABLET ORAL
Qty: 90 TABLET | Refills: 1 | Status: SHIPPED | OUTPATIENT
Start: 2023-09-20 | End: 2024-03-13

## 2023-09-20 RX ORDER — PREGABALIN 200 MG/1
200 CAPSULE ORAL 2 TIMES DAILY
Qty: 180 CAPSULE | Refills: 1 | Status: SHIPPED | OUTPATIENT
Start: 2023-09-20 | End: 2024-03-13

## 2023-09-20 RX ORDER — CITALOPRAM HYDROBROMIDE 20 MG/1
20 TABLET ORAL DAILY
Qty: 90 TABLET | Refills: 1 | Status: SHIPPED | OUTPATIENT
Start: 2023-09-20 | End: 2024-03-13

## 2023-09-20 RX ORDER — NIFEDIPINE 30 MG
30 TABLET, EXTENDED RELEASE ORAL AT BEDTIME
Qty: 90 TABLET | Refills: 1 | Status: SHIPPED | OUTPATIENT
Start: 2023-09-20 | End: 2024-03-13

## 2023-09-20 RX ORDER — PREGABALIN 150 MG/1
150 CAPSULE ORAL 2 TIMES DAILY
COMMUNITY
Start: 2023-09-18 | End: 2023-09-20 | Stop reason: DRUGHIGH

## 2023-09-20 RX ADMIN — CYANOCOBALAMIN 1000 MCG: 1000 INJECTION, SOLUTION INTRAMUSCULAR; SUBCUTANEOUS at 16:18

## 2023-09-20 NOTE — PROGRESS NOTES
"       Assessment & Plan     Type 2 diabetes mellitus with diabetic neuropathy, without long-term current use of insulin (H)  Well controlled, continue current medications at current doses control   - HEMOGLOBIN A1C (BFP)    Mixed hyperlipidemia  Control uncertain, continue current medications at current doses pending ;abs  - VENOUS COLLECTION    Essential hypertension, benign  Well controlled, continue current medications at current doses     SONYA (generalized anxiety disorder)  Well ocontrolled, continue current medications at current doses     Hypothyroidism, unspecified type  stable symptomatically continue current medications at current doses pending labs    Raynaud's disease without gangrene      Small fiber neuropathy  Stable, followed by neurology, continue current medications at current doses     B-complex deficiency  B12 shot      Review of external notes as documented elsewhere in note  Review of the result(s) of each unique test - labs  Ordering of each unique test  Prescription drug management         BMI:   Estimated body mass index is 23.96 kg/m  as calculated from the following:    Height as of 1/7/23: 1.651 m (5' 5\").    Weight as of this encounter: 65.3 kg (144 lb).       FUTURE APPOINTMENTS:       - Follow-up visit in 6 mo  Regular exercise    No follow-ups on file.    Berhane Stevens MD  [facility]    Subjective   Trisha Vaughn is a 88 year old, presenting for the following health issues:  Recheck Medication (Pt is here for a medication recheck. Here also for a b-12 injection.)    HPI       Diabetes Follow-up    How often are you checking your blood sugar? Not at all  What concerns do you have today about your diabetes? None   Do you have any of these symptoms? (Select all that apply)  Numbness in feet and Burning in feet  Have you had a diabetic eye exam in the last 12 months? yes            Hyperlipidemia Follow-Up    Are you regularly taking any medication or supplement " to lower your cholesterol?   Yes- simvastatin  Are you having muscle aches or other side effects that you think could be caused by your cholesterol lowering medication?  No    Hypertension Follow-up    Do you check your blood pressure regularly outside of the clinic? Yes   Are you following a low salt diet? No  Are your blood pressures ever more than 140 on the top number (systolic) OR more   than 90 on the bottom number (diastolic), for example 140/90? No    BP Readings from Last 2 Encounters:   09/20/23 106/62   02/14/23 102/64     Hemoglobin A1C (%)   Date Value   02/01/2023 6.3 (H)   08/31/2022 5.9   03/16/2022 6.1     LDL Cholesterol Calculated (mg/dL (calc))   Date Value   01/11/2019 70   12/28/2017 76     LDL Cholesterol Direct (mg/dL)   Date Value   08/19/2020 69   01/07/2020 83         Anxiety Follow-Up  How are you doing with your anxiety since your last visit? No change  Are you having other symptoms that might be associated with anxiety? No  Have you had a significant life event? No   Are you feeling depressed? No  Do you have any concerns with your use of alcohol or other drugs? No    Social History     Tobacco Use    Smoking status: Never    Smokeless tobacco: Never   Substance Use Topics    Alcohol use: No     Alcohol/week: 0.0 standard drinks of alcohol     Comment: 0    Drug use: No         1/11/2019    12:40 PM 8/7/2019     4:24 PM 9/20/2021     5:41 PM   SONYA-7 SCORE   Total Score 3 3 1         8/7/2019     4:24 PM 1/7/2020    12:54 PM 9/20/2021     5:41 PM   PHQ   PHQ-9 Total Score 6 4 10   Q9: Thoughts of better off dead/self-harm past 2 weeks Not at all Not at all Not at all         9/20/2021     5:41 PM   Last PHQ-9   1.  Little interest or pleasure in doing things 2   2.  Feeling down, depressed, or hopeless 1   3.  Trouble falling or staying asleep, or sleeping too much 3   4.  Feeling tired or having little energy 2   5.  Poor appetite or overeating 2   6.  Feeling bad about yourself 0   7.   Trouble concentrating 0   8.  Moving slowly or restless 0   Q9: Thoughts of better off dead/self-harm past 2 weeks 0   PHQ-9 Total Score 10   Difficulty at work, home, or with people Somewhat difficult         9/20/2021     5:41 PM   SONYA-7    1. Feeling nervous, anxious, or on edge 0   2. Not being able to stop or control worrying 0   3. Worrying too much about different things 0   4. Trouble relaxing 0   5. Being so restless that it is hard to sit still 0   6. Becoming easily annoyed or irritable 0   7. Feeling afraid, as if something awful might happen 1   SONYA-7 Total Score 1   If you checked any problems, how difficult have they made it for you to do your work, take care of things at home, or get along with other people? Not difficult at all     Hypothyroidism Follow-up    Since last visit, patient describes the following symptoms: Weight stable, no hair loss, no skin changes, no constipation, no loose stools  Neuropathy- saw Dr Angela, notes reviewed, using Lyrica    B12 -getting shots  How many servings of fruits and vegetables do you eat daily?  2-3  On average, how many sweetened beverages do you drink each day (Examples: soda, juice, sweet tea, etc.  Do NOT count diet or artificially sweetened beverages)?   1  How many days per week do you exercise enough to make your heart beat faster? 6  How many minutes a day do you exercise enough to make your heart beat faster? 10 - 19  How many days per week do you miss taking your medication? 0        Review of Systems   Constitutional, HEENT, cardiovascular, pulmonary, gi and gu systems are negative, except as otherwise noted.      Objective    /62 (BP Location: Right arm, Patient Position: Sitting, Cuff Size: Adult Regular)   Pulse 56   Temp 98.1  F (36.7  C)   Wt 65.3 kg (144 lb)   SpO2 93%   BMI 23.96 kg/m    Body mass index is 23.96 kg/m .  Physical Exam   GENERAL: healthy, alert and no distress  NECK: no adenopathy, no asymmetry, masses, or scars and  thyroid normal to palpation  RESP: lungs clear to auscultation - no rales, rhonchi or wheezes  CV: regular rate and rhythm, normal S1 S2, no S3 or S4, no murmur, click or rub, no peripheral edema and peripheral pulses strong  ABDOMEN: soft, nontender, no hepatosplenomegaly, no masses and bowel sounds normal  MS: no gross musculoskeletal defects noted, no edema  PSYCH: mentation appears normal, affect normal/bright    Results for orders placed or performed in visit on 09/20/23 (from the past 24 hour(s))   HEMOGLOBIN A1C (BFP)   Result Value Ref Range    Hemoglobin A1C 5.9 4.0 - 7.0 %

## 2023-09-20 NOTE — NURSING NOTE
Chief Complaint   Patient presents with    Recheck Medication     Pt is here for a medication recheck. Here also for a b-12 injection.    Pre-visit Screening:  Immunizations:  up to date  Colonoscopy:  na  Mammogram: na  Asthma Action Test/Plan:  na  PHQ9:  na  GAD7:  na  Questioned patient about current smoking habits Pt. has never smoked.  Ok to leave detailed message on voice mail for today's visit only yes, phone # [phone]

## 2023-10-17 ENCOUNTER — TRANSFERRED RECORDS (OUTPATIENT)
Dept: FAMILY MEDICINE | Facility: CLINIC | Age: 88
End: 2023-10-17

## 2023-10-19 DIAGNOSIS — G62.9 SMALL FIBER NEUROPATHY: Primary | ICD-10-CM

## 2023-10-19 RX ORDER — PREGABALIN 150 MG/1
150 CAPSULE ORAL 2 TIMES DAILY
Qty: 180 CAPSULE | Refills: 1 | Status: SHIPPED | OUTPATIENT
Start: 2023-10-19 | End: 2024-03-13

## 2023-10-19 NOTE — TELEPHONE ENCOUNTER
Patients daughter called into the CS line stating that the wrong dose for the patients pregabalin was sent in. The patient has been taking 150 mg BID per instructions from Dr. Angela in Neurology. Patients daughter is asking for the correct dose to be resent to the pharmacy-routing to [facility] due to [facility] being out of clinic to send in.      Pending Prescriptions:                       Disp   Refills    pregabalin (LYRICA) 150 MG capsule        180 ca*1            Sig: Take 1 capsule (150 mg) by mouth 2 times daily

## 2023-10-24 ENCOUNTER — ALLIED HEALTH/NURSE VISIT (OUTPATIENT)
Dept: FAMILY MEDICINE | Facility: CLINIC | Age: 88
End: 2023-10-24

## 2023-10-24 DIAGNOSIS — E53.9 B-COMPLEX DEFICIENCY: Primary | ICD-10-CM

## 2023-10-24 PROCEDURE — 96372 THER/PROPH/DIAG INJ SC/IM: CPT | Performed by: FAMILY MEDICINE

## 2023-10-24 RX ADMIN — CYANOCOBALAMIN 1000 MCG: 1000 INJECTION, SOLUTION INTRAMUSCULAR; SUBCUTANEOUS at 15:21

## 2023-11-21 ENCOUNTER — ALLIED HEALTH/NURSE VISIT (OUTPATIENT)
Dept: FAMILY MEDICINE | Facility: CLINIC | Age: 88
End: 2023-11-21

## 2023-11-21 DIAGNOSIS — E53.9 B-COMPLEX DEFICIENCY: Primary | ICD-10-CM

## 2023-11-21 PROCEDURE — 96372 THER/PROPH/DIAG INJ SC/IM: CPT | Performed by: FAMILY MEDICINE

## 2023-11-21 RX ADMIN — CYANOCOBALAMIN 1000 MCG: 1000 INJECTION, SOLUTION INTRAMUSCULAR; SUBCUTANEOUS at 15:05

## 2023-12-19 ENCOUNTER — ALLIED HEALTH/NURSE VISIT (OUTPATIENT)
Dept: FAMILY MEDICINE | Facility: CLINIC | Age: 88
End: 2023-12-19

## 2023-12-19 DIAGNOSIS — E53.9 B-COMPLEX DEFICIENCY: Primary | ICD-10-CM

## 2023-12-19 PROCEDURE — 96372 THER/PROPH/DIAG INJ SC/IM: CPT | Performed by: FAMILY MEDICINE

## 2023-12-19 RX ADMIN — CYANOCOBALAMIN 1000 MCG: 1000 INJECTION, SOLUTION INTRAMUSCULAR; SUBCUTANEOUS at 15:09

## 2024-01-16 ENCOUNTER — ALLIED HEALTH/NURSE VISIT (OUTPATIENT)
Dept: FAMILY MEDICINE | Facility: CLINIC | Age: 89
End: 2024-01-16

## 2024-01-16 DIAGNOSIS — E53.9 B-COMPLEX DEFICIENCY: Primary | ICD-10-CM

## 2024-01-16 PROCEDURE — 96372 THER/PROPH/DIAG INJ SC/IM: CPT | Performed by: FAMILY MEDICINE

## 2024-01-16 RX ADMIN — CYANOCOBALAMIN 1000 MCG: 1000 INJECTION, SOLUTION INTRAMUSCULAR; SUBCUTANEOUS at 15:13

## 2024-02-13 ENCOUNTER — ALLIED HEALTH/NURSE VISIT (OUTPATIENT)
Dept: FAMILY MEDICINE | Facility: CLINIC | Age: 89
End: 2024-02-13

## 2024-02-13 DIAGNOSIS — E53.9 B-COMPLEX DEFICIENCY: Primary | ICD-10-CM

## 2024-02-18 PROCEDURE — 96372 THER/PROPH/DIAG INJ SC/IM: CPT | Performed by: FAMILY MEDICINE

## 2024-02-18 RX ADMIN — CYANOCOBALAMIN 1000 MCG: 1000 INJECTION, SOLUTION INTRAMUSCULAR; SUBCUTANEOUS at 20:55

## 2024-02-19 NOTE — PROGRESS NOTES
Clinic Administered Medication Documentation      Injectable Medication Documentation    Is there an active order (written within the past 365 days, with administrations remaining, not ) in the chart? Yes.     Patient was given Cyanocobalamin (B-12). Prior to medication administration, verified patient's identity using patient s name and date of birth. Please see MAR and medication order for additional information. Patient instructed to remain in clinic for 15 minutes and report any adverse reaction to staff immediately.    Vial/Syringe: Single dose vial. Was entire vial of medication used? Yes  Was this medication supplied by the patient? No  Is this a medication the patient will need to receive again? Yes. Verified that the patient has refills remaining in their prescription.    Lot number: [id number]  Expiration date: 2025   : [employer] [place]   ND: 2833-6562-69    Given by Haleigh Thrasher CMA

## 2024-03-13 ENCOUNTER — OFFICE VISIT (OUTPATIENT)
Dept: FAMILY MEDICINE | Facility: CLINIC | Age: 89
End: 2024-03-13

## 2024-03-13 VITALS
SYSTOLIC BLOOD PRESSURE: 136 MMHG | WEIGHT: 156 LBS | RESPIRATION RATE: 20 BRPM | HEIGHT: 64 IN | BODY MASS INDEX: 26.63 KG/M2 | DIASTOLIC BLOOD PRESSURE: 70 MMHG | TEMPERATURE: 97.2 F | HEART RATE: 72 BPM

## 2024-03-13 DIAGNOSIS — Z00.00 ENCOUNTER FOR ANNUAL WELLNESS EXAM IN MEDICARE PATIENT: ICD-10-CM

## 2024-03-13 DIAGNOSIS — I10 ESSENTIAL HYPERTENSION, BENIGN: ICD-10-CM

## 2024-03-13 DIAGNOSIS — E11.40 TYPE 2 DIABETES MELLITUS WITH DIABETIC NEUROPATHY, WITHOUT LONG-TERM CURRENT USE OF INSULIN (H): Primary | ICD-10-CM

## 2024-03-13 DIAGNOSIS — I73.00 RAYNAUD'S DISEASE WITHOUT GANGRENE: ICD-10-CM

## 2024-03-13 DIAGNOSIS — F41.1 GAD (GENERALIZED ANXIETY DISORDER): ICD-10-CM

## 2024-03-13 DIAGNOSIS — G62.9 SMALL FIBER NEUROPATHY: ICD-10-CM

## 2024-03-13 DIAGNOSIS — E78.2 MIXED HYPERLIPIDEMIA: ICD-10-CM

## 2024-03-13 DIAGNOSIS — E53.9 B-COMPLEX DEFICIENCY: ICD-10-CM

## 2024-03-13 DIAGNOSIS — E03.9 HYPOTHYROIDISM, UNSPECIFIED TYPE: ICD-10-CM

## 2024-03-13 LAB
BUN SERPL-MCNC: 15 MG/DL (ref 7–25)
BUN/CREATININE RATIO: 17.6 (ref 6–32)
CALCIUM SERPL-MCNC: 9.2 MG/DL (ref 8.6–10.3)
CHLORIDE SERPLBLD-SCNC: 103.7 MMOL/L (ref 98–110)
CO2 SERPL-SCNC: 31 MMOL/L (ref 20–32)
CREAT SERPL-MCNC: 0.85 MG/DL (ref 0.6–1.3)
GLUCOSE SERPL-MCNC: 81 MG/DL (ref 60–99)
HEMOGLOBIN A1C: 6.2 % (ref 4–5.6)
POTASSIUM SERPL-SCNC: 4.67 MMOL/L (ref 3.5–5.3)
SODIUM SERPL-SCNC: 140.9 MMOL/L (ref 135–146)

## 2024-03-13 PROCEDURE — 99214 OFFICE O/P EST MOD 30 MIN: CPT | Mod: 25 | Performed by: FAMILY MEDICINE

## 2024-03-13 PROCEDURE — 83036 HEMOGLOBIN GLYCOSYLATED A1C: CPT | Performed by: FAMILY MEDICINE

## 2024-03-13 PROCEDURE — G0439 PPPS, SUBSEQ VISIT: HCPCS | Performed by: FAMILY MEDICINE

## 2024-03-13 PROCEDURE — 80048 BASIC METABOLIC PNL TOTAL CA: CPT | Performed by: FAMILY MEDICINE

## 2024-03-13 PROCEDURE — 36415 COLL VENOUS BLD VENIPUNCTURE: CPT | Performed by: FAMILY MEDICINE

## 2024-03-13 RX ORDER — NIFEDIPINE 30 MG
30 TABLET, EXTENDED RELEASE ORAL AT BEDTIME
Qty: 90 TABLET | Refills: 1 | Status: SHIPPED | OUTPATIENT
Start: 2024-03-13 | End: 2024-10-02

## 2024-03-13 RX ORDER — PREGABALIN 200 MG/1
200 CAPSULE ORAL 2 TIMES DAILY
Qty: 180 CAPSULE | Refills: 1 | Status: SHIPPED | OUTPATIENT
Start: 2024-03-13 | End: 2024-03-14

## 2024-03-13 RX ORDER — SIMVASTATIN 20 MG
TABLET ORAL
Qty: 90 TABLET | Refills: 1 | Status: SHIPPED | OUTPATIENT
Start: 2024-03-13 | End: 2024-10-02

## 2024-03-13 RX ORDER — PREGABALIN 150 MG/1
150 CAPSULE ORAL 2 TIMES DAILY
Qty: 180 CAPSULE | Refills: 1 | Status: SHIPPED | OUTPATIENT
Start: 2024-03-13 | End: 2024-10-02

## 2024-03-13 RX ORDER — CITALOPRAM HYDROBROMIDE 20 MG/1
20 TABLET ORAL DAILY
Qty: 90 TABLET | Refills: 1 | Status: SHIPPED | OUTPATIENT
Start: 2024-03-13 | End: 2024-10-02

## 2024-03-13 RX ORDER — LEVOTHYROXINE SODIUM 112 UG/1
TABLET ORAL
Qty: 90 TABLET | Refills: 1 | Status: SHIPPED | OUTPATIENT
Start: 2024-03-13 | End: 2024-10-02

## 2024-03-13 NOTE — PROGRESS NOTES
Trisha Lucia is a 88 year old female who presents for Medicare Annual Wellness Visit.    Current providers caring for this patient include:  Patient Care Team:  Berhane Stevens MD as PCP - General (Family Practice)  Berhane Stevens MD as Assigned PCP    Complete Medical and Social history reviewed with patient, outlined below.    Patient Active Problem List   Diagnosis    Essential hypertension, benign    Other specified idiopathic peripheral neuropathy    Hypothyroidism    Osteoarthrosis, unspecified whether generalized or localized, involving lower leg    Obesity    Irritable bowel syndrome    Mixed hyperlipidemia    ANNA (obstructive sleep apnea)    Health Care Home    B-complex deficiency    Hallux valgus with bunions    ACP (advance care planning)    Type 2 diabetes mellitus with diabetic neuropathy (H)    Pernicious anemia    Spinal stenosis    SONYA (generalized anxiety disorder)    Septic shock (H)    Lactic acidemia    Infectious encephalopathy    Pneumonia of both lower lobes due to infectious organism       Past Medical History:   Diagnosis Date    Cellulitis and abscess of foot 5/30/2008    DIABETES MELLITUS TYPE II-UNCOMPL 10/24/2007    Essential hypertension, benign     History of small bowel obstruction 6/27/2011 6/16/2011 She had a CT of the abdomen in the emergency room which was suggestive of a small-bowel obstruction. Her lactic acid level was elevated. Surgery was consulted. The patient was put on IV fluids and pain medications. After surgical evaluation, she underwent surgery and had a laparotomy with lysis of adhesive band and small bowel resection with primary enteroenterostomy and appendectomy. During her hospital course, the patient had a good recovery.     Mucous polyp of cervix 9/28/2004    Open wound of foot excluding toes 4/3/2013     Problem list name updated by automated process. Provider to review    Other abnormal glucose     Other specified idiopathic  peripheral neuropathy     Sleep apnea     Unspecified hypothyroidism        Past Surgical History:   Procedure Laterality Date    BUNIONECTOMY  4/9/2013    Procedure: BUNIONECTOMY;  RIGHT GREAT CLAWTOE CORRECTION WITH TENDON TRANSFER, ENDOSCOPIC RECESSION OF GASTRONEMIUS, DEBRIDEMENT OF ULCER ON SOLE OF RIGHT FOOT;  Surgeon: Stephon Rae MD;  Location: [facility]    ENDOSCOPIC RECESSION GASTROCNEMIUS (DONTA)  4/9/2013    Procedure: ENDOSCOPIC RECESSION GASTROCNEMIUS (DONTA);;  Surgeon: Stephon Rae MD;  Location: [facility]    HC DEBRIDMENT MASTOID CAVITY, SIMPLE      L ear    HC KNEE SCOPE, DIAGNOSTIC  1997    Arthroscopy, Knee    HC KNEE SCOPE, DIAGNOSTIC  2001    Arthroscopy, Knee    HC OPEN TX TRIMALLEOLAR ANKLE FX W FIX PST LIP Right 2014    HC REPAIR OF HAMMERTOE,ONE  2015    [facility]    HERNIORRHAPHY INCISIONAL (LOCATION) N/A 1/13/2017    Procedure: HERNIORRHAPHY INCISIONAL (LOCATION);  Surgeon: Joaquin Skaggs MD;  Location: [facility]    IRRIGATION AND DEBRIDEMENT FOOT, COMBINED  4/9/2013    Procedure: COMBINED IRRIGATION AND DEBRIDEMENT FOOT;  Debridement of sole ulcer right foot;  Surgeon: Stephon Rae MD;  Location: [facility]    KNEE SURGERY  2011    right total-Dr. Gorman    Small bowel obstruction  6/2011    Small-bowel obstruction, status post laparotomy with lysis of adhesions, small bowel resection with primary enteroenterostomy and appendectomy    ZZHC COLONOSCOPY THRU STOMA, DIAGNOSTIC  2001       Family History   Problem Relation Age of Onset    C.A.D. Mother     Diabetes No family hx of     Hypertension Mother     Breast Cancer No family hx of     Cancer - colorectal No family hx of        Social History     Tobacco Use    Smoking status: Never     Passive exposure: Never    Smokeless tobacco: Never   Substance Use Topics    Alcohol use: No     Alcohol/week: 0.0 standard drinks of alcohol     Comment: 0       Diet: regular, low salt/low fat  Physical Activity: active  "without specific exercise program  Depression Screen:    Over the past 2 weeks, patient has felt down, depressed, or hopeless:  No    Over the past 2 weeks, patient has felt little interest or pleasure in doing things: No    Functional ability/Safety screen:  Up and go test (able to get up and walk longer than 30 seconds): Passed  Patient needs assistance with: nothing  Patient's home has the following possible safety concerns: none identified  Patient has concerns about her hearing:  No  Cognitive Screen  Patient repeats three objects (ball, flag, tree)      Clock drawing test:   NORMAL  Recalls three objects after 3 minutes (ball,flag,tree):                                                                                               recalls 2 objects (2 points)    Physical Exam:  /70 (BP Location: Right arm, Patient Position: Chair, Cuff Size: Adult Regular)   Pulse 72   Temp 97.2  F (36.2  C) (Temporal)   Resp 20   Ht 1.626 m (5' 4\")   Wt 70.8 kg (156 lb)   BMI 26.78 kg/m     Body mass index is 26.78 kg/m .              End of Life Planning:   Patient currently has an advanced directive: Yes.  Practitioner is supportive of decision.    Education/Counseling:   Based on review of the above information, the following items were addressed:      Elevated blood pressure - follow-up plans made    Appropriate preventive services were discussed with this patient, including applicable screening as appropriate for cardiovascular disease, diabetes, osteopenia/osteoporosis, and glaucoma.  As appropriate for age/gender, discussed screening for colorectal cancer, prostate cancer, breast cancer, and cervical cancer.   Checklist reviewing preventive services available has been given to the patient.                  Subjective   Trisha Vaughn is a 88 year old, presenting for the following health issues:  Recheck Medication and Wellness Visit    HPI       Diabetes Follow-up    How often are you checking your blood sugar? " Not at all  What concerns do you have today about your diabetes? None   Do you have any of these symptoms? (Select all that apply)  Numbness in feet          Hyperlipidemia Follow-Up    Are you regularly taking any medication or supplement to lower your cholesterol?   Yes- simvastatin  Are you having muscle aches or other side effects that you think could be caused by your cholesterol lowering medication?  No    Hypertension Follow-up    Do you check your blood pressure regularly outside of the clinic? Yes   Are you following a low salt diet? No  Are your blood pressures ever more than 140 on the top number (systolic) OR more   than 90 on the bottom number (diastolic), for example 140/90? No    BP Readings from Last 2 Encounters:   03/13/24 136/70   09/20/23 106/62     Hemoglobin A1C (%)   Date Value   09/20/2023 5.9   02/01/2023 6.3 (H)   08/31/2022 5.9     LDL Cholesterol Calculated (mg/dL (calc))   Date Value   01/11/2019 70   12/28/2017 76     LDL Cholesterol Direct (mg/dL)   Date Value   08/19/2020 69   01/07/2020 83         Anxiety   How are you doing with your anxiety since your last visit? No change  Are you having other symptoms that might be associated with anxiety? No  Have you had a significant life event? No   Are you feeling depressed? No  Do you have any concerns with your use of alcohol or other drugs? No    Social History     Tobacco Use    Smoking status: Never     Passive exposure: Never    Smokeless tobacco: Never   Substance Use Topics    Alcohol use: No     Alcohol/week: 0.0 standard drinks of alcohol     Comment: 0    Drug use: No         1/11/2019    12:40 PM 8/7/2019     4:24 PM 9/20/2021     5:41 PM   SONYA-7 SCORE   Total Score 3 3 1         8/7/2019     4:24 PM 1/7/2020    12:54 PM 9/20/2021     5:41 PM   PHQ   PHQ-9 Total Score 6 4 10   Q9: Thoughts of better off dead/self-harm past 2 weeks Not at all Not at all Not at all         9/20/2021     5:41 PM   Last PHQ-9   1.  Little interest or  pleasure in doing things 2   2.  Feeling down, depressed, or hopeless 1   3.  Trouble falling or staying asleep, or sleeping too much 3   4.  Feeling tired or having little energy 2   5.  Poor appetite or overeating 2   6.  Feeling bad about yourself 0   7.  Trouble concentrating 0   8.  Moving slowly or restless 0   Q9: Thoughts of better off dead/self-harm past 2 weeks 0   PHQ-9 Total Score 10   Difficulty at work, home, or with people Somewhat difficult         9/20/2021     5:41 PM   SONYA-7    1. Feeling nervous, anxious, or on edge 0   2. Not being able to stop or control worrying 0   3. Worrying too much about different things 0   4. Trouble relaxing 0   5. Being so restless that it is hard to sit still 0   6. Becoming easily annoyed or irritable 0   7. Feeling afraid, as if something awful might happen 1   SONYA-7 Total Score 1   If you checked any problems, how difficult have they made it for you to do your work, take care of things at home, or get along with other people? Not difficult at all     Hypothyroidism Follow-up    Since last visit, patient describes the following symptoms: Weight stable, no hair loss, no skin changes, no constipation, no loose stools  Nueropathy- using Lyrica, working well  How many servings of fruits and vegetables do you eat daily?  2-3  On average, how many sweetened beverages do you drink each day (Examples: soda, juice, sweet tea, etc.  Do NOT count diet or artificially sweetened beverages)?   1  How many days per week do you exercise enough to make your heart beat faster? 3 or less  How many minutes a day do you exercise enough to make your heart beat faster? 10 - 19  How many days per week do you miss taking your medication? 0        Review of Systems  Constitutional, HEENT, cardiovascular, pulmonary, gi and gu systems are negative, except as otherwise noted.      Objective    /70 (BP Location: Right arm, Patient Position: Chair, Cuff Size: Adult Regular)   Pulse 72    "Temp 97.2  F (36.2  C) (Temporal)   Resp 20   Ht 1.626 m (5' 4\")   Wt 70.8 kg (156 lb)   BMI 26.78 kg/m    Body mass index is 26.78 kg/m .  Physical Exam   GENERAL: alert and no distress  NECK: no adenopathy, no asymmetry, masses, or scars  RESP: lungs clear to auscultation - no rales, rhonchi or wheezes  CV: regular rate and rhythm, normal S1 S2, no S3 or S4, no murmur, click or rub, no peripheral edema  ABDOMEN: soft, nontender, no hepatosplenomegaly, no masses and bowel sounds normal  MS: no gross musculoskeletal defects noted, no edema  PSYCH: mentation appears normal, affect normal/bright    Results for orders placed or performed in visit on 03/13/24 (from the past 24 hour(s))   HEMOGLOBIN A1C (BFP)   Result Value Ref Range    Hemoglobin A1C 6.2 (A) 4 - 5.6 %           Signed Electronically by: Berhane Stevens MD         Assessment & Plan     Type 2 diabetes mellitus with diabetic neuropathy, without long-term current use of insulin (H)  Well controlled, continue current medications at current doses   - metFORMIN (GLUCOPHAGE) 1000 MG tablet  Dispense: 180 tablet; Refill: 1  - HEMOGLOBIN A1C (BFP)    Mixed hyperlipidemia  Controlled based on past labs, continue current medications at current doses , fasting next draw  - simvastatin (ZOCOR) 20 MG tablet  Dispense: 90 tablet; Refill: 1    Essential hypertension, benign  Well controlled, continue current medications at current doses   - NIFEdipine ER (ADALAT CC) 30 MG 24 hr tablet  Dispense: 90 tablet; Refill: 1  - Basic Metabolic Panel (BFP)  - VENOUS COLLECTION    SONYA (generalized anxiety disorder)  Well controlled, continue current medications at current doses   - citalopram (CELEXA) 20 MG tablet  Dispense: 90 tablet; Refill: 1    Hypothyroidism, unspecified type  stable symptomatically continue current medications at current doses   - levothyroxine (SYNTHROID/LEVOTHROID) 112 MCG tablet  Dispense: 90 tablet; Refill: 1    Raynaud's disease without " "gangrene  Well controlled    Small fiber neuropathy  Stable, continue current medications at current doses   - pregabalin (LYRICA) 150 MG capsule  Dispense: 180 capsule; Refill: 1  - Pregabalin (LYRICA) 200 MG capsule  Dispense: 180 capsule; Refill: 1    B-complex deficiency  B12    Encounter for annual wellness exam in Medicare patient  discussed preventitive healthcare Continue to work on healthy diet and exercise, discussed healthy habits       Review of the result(s) of each unique test - labs  Ordering of each unique test  Prescription drug management        BMI  Estimated body mass index is 26.78 kg/m  as calculated from the following:    Height as of this encounter: 1.626 m (5' 4\").    Weight as of this encounter: 70.8 kg (156 lb).         FUTURE APPOINTMENTS:       - Follow-up visit in 6 mo  Regular exercise    No follow-ups on file.  "

## 2024-03-13 NOTE — NURSING NOTE
Trisha Lucia is here for a medication check and refill.    Questioned patient about current smoking habits.  Pt. has never smoked.  PULSE regular  My Chart: active  CLASSIFICATION OF OVERWEIGHT AND OBESITY BY BMI                        Obesity Class           BMI(kg/m2)  Underweight                                    < 18.5  Normal                                         18.5-24.9  Overweight                                     25.0-29.9  OBESITY                     I                  30.0-34.9                             II                 35.0-39.9  EXTREME OBESITY             III                >40                            Patient's  BMI Body mass index is 26.78 kg/m .  http://hin.nhlbi.nih.gov/menuplanner/menu.cgi  Pre-visit planning  Immunizations - up to date  Colonoscopy -   Mammogram -   Asthma -   PHQ9 -    SONYA-7 -      The patient has verbalized that it is ok to leave a detailed voice message on the patient's cell phone with results/recommendations from this visit.

## 2024-04-07 ENCOUNTER — TELEPHONE (OUTPATIENT)
Dept: FAMILY MEDICINE | Facility: CLINIC | Age: 89
End: 2024-04-07

## 2024-04-08 NOTE — TELEPHONE ENCOUNTER
Received call from GERALD Higginbotham at pt's assisted living. Pt twisted her L knee getting out of her son's truck today-hit knee on truck and twisted, now painful, swollen. Is able to walk but slower than normal. RN requesting verbal order for L knee XR, I approved. She is comfortable tonight, taking scheduled Tylenol and icing/elevating knee. Recommended pt make appt if pain worsening or ongoing. No questions from RN.  Reymundo Jolley PA-C  [facility]

## 2024-04-10 ENCOUNTER — ALLIED HEALTH/NURSE VISIT (OUTPATIENT)
Dept: FAMILY MEDICINE | Facility: CLINIC | Age: 89
End: 2024-04-10

## 2024-04-10 DIAGNOSIS — E53.9 B-COMPLEX DEFICIENCY: Primary | ICD-10-CM

## 2024-04-10 PROCEDURE — 96372 THER/PROPH/DIAG INJ SC/IM: CPT | Performed by: FAMILY MEDICINE

## 2024-04-10 RX ADMIN — CYANOCOBALAMIN 1000 MCG: 1000 INJECTION, SOLUTION INTRAMUSCULAR; SUBCUTANEOUS at 15:03

## 2024-04-22 ENCOUNTER — TRANSFERRED RECORDS (OUTPATIENT)
Dept: FAMILY MEDICINE | Facility: CLINIC | Age: 89
End: 2024-04-22

## 2024-04-23 RX ORDER — LATANOPROST 50 UG/ML
1 SOLUTION/ DROPS OPHTHALMIC AT BEDTIME
COMMUNITY
Start: 2024-04-23

## 2024-04-23 NOTE — TELEPHONE ENCOUNTER
Trisha Lucia is requesting a refill of:    Refused Prescriptions:                       Disp   Refills    latanoprost (XALATAN) 0.005 % ophthalmic s*                Sig: INSTILL 1 DROP IN BOTH EYES AT BEDTIME  Refused By: ANDRA CRISTINA  Reason for Refusal: Originating/Specialty Provider to approve    Needs to be sent to her eye specialist

## 2024-04-24 RX ORDER — LATANOPROST 50 UG/ML
1 SOLUTION/ DROPS OPHTHALMIC AT BEDTIME
COMMUNITY
Start: 2024-04-24

## 2024-04-24 NOTE — TELEPHONE ENCOUNTER
Trisha Lucia is requesting a refill of:    Refused Prescriptions:                       Disp   Refills    latanoprost (XALATAN) 0.005 % ophthalmic s*                Sig: INSTILL 1 DROP IN BOTH EYES AT BEDTIME  Refused By: ANDRA CRISTINA  Reason for Refusal: Originating/Specialty Provider to approve    Should come from Dr Julio César Smith

## 2024-05-08 ENCOUNTER — ALLIED HEALTH/NURSE VISIT (OUTPATIENT)
Dept: FAMILY MEDICINE | Facility: CLINIC | Age: 89
End: 2024-05-08

## 2024-05-08 DIAGNOSIS — E53.9 B-COMPLEX DEFICIENCY: Primary | ICD-10-CM

## 2024-05-08 PROCEDURE — 96372 THER/PROPH/DIAG INJ SC/IM: CPT | Performed by: FAMILY MEDICINE

## 2024-05-08 RX ADMIN — CYANOCOBALAMIN 1000 MCG: 1000 INJECTION, SOLUTION INTRAMUSCULAR; SUBCUTANEOUS at 15:07

## 2024-05-14 RX ORDER — LATANOPROST 50 UG/ML
1 SOLUTION/ DROPS OPHTHALMIC AT BEDTIME
COMMUNITY
Start: 2024-05-14

## 2024-05-14 NOTE — TELEPHONE ENCOUNTER
Trisha Lucia is requesting a refill of:    Refused Prescriptions:                       Disp   Refills    latanoprost (XALATAN) 0.005 % ophthalmic s*                Sig: INSTILL 1 DROP IN BOTH EYES AT BEDTIME  Refused By: ANDRA CRISTINA  Reason for Refusal: Originating/Specialty Provider to approve    Needs to come from Eye specialist

## 2024-05-16 RX ORDER — LATANOPROST 50 UG/ML
1 SOLUTION/ DROPS OPHTHALMIC AT BEDTIME
COMMUNITY
Start: 2024-05-16

## 2024-05-16 NOTE — TELEPHONE ENCOUNTER
Trisha Lucia is requesting a refill of:    Refused Prescriptions:                       Disp   Refills    latanoprost (XALATAN) 0.005 % ophthalmic s*                Sig: INSTILL 1 DROP IN BOTH EYES AT BEDTIME  Refused By: ANDRA CRISTINA  Reason for Refusal: Originating/Specialty Provider to approve    Should be sent to Eye Specialist

## 2024-06-05 ENCOUNTER — ALLIED HEALTH/NURSE VISIT (OUTPATIENT)
Dept: FAMILY MEDICINE | Facility: CLINIC | Age: 89
End: 2024-06-05

## 2024-06-05 DIAGNOSIS — E53.9 B-COMPLEX DEFICIENCY: Primary | ICD-10-CM

## 2024-06-05 PROCEDURE — 96372 THER/PROPH/DIAG INJ SC/IM: CPT | Performed by: FAMILY MEDICINE

## 2024-06-05 RX ADMIN — CYANOCOBALAMIN 1000 MCG: 1000 INJECTION, SOLUTION INTRAMUSCULAR; SUBCUTANEOUS at 15:07

## 2024-07-03 ENCOUNTER — ALLIED HEALTH/NURSE VISIT (OUTPATIENT)
Dept: FAMILY MEDICINE | Facility: CLINIC | Age: 89
End: 2024-07-03

## 2024-07-03 DIAGNOSIS — E53.9 B-COMPLEX DEFICIENCY: Primary | ICD-10-CM

## 2024-07-03 PROCEDURE — 96372 THER/PROPH/DIAG INJ SC/IM: CPT | Performed by: FAMILY MEDICINE

## 2024-07-03 RX ADMIN — CYANOCOBALAMIN 1000 MCG: 1000 INJECTION, SOLUTION INTRAMUSCULAR; SUBCUTANEOUS at 15:21

## 2024-07-03 NOTE — NURSING NOTE
Chief Complaint   Patient presents with    Allied Health Visit     B12 injection     Given in right deltoid.

## 2024-07-31 ENCOUNTER — ALLIED HEALTH/NURSE VISIT (OUTPATIENT)
Dept: FAMILY MEDICINE | Facility: CLINIC | Age: 89
End: 2024-07-31

## 2024-07-31 DIAGNOSIS — E53.9 B-COMPLEX DEFICIENCY: Primary | ICD-10-CM

## 2024-07-31 PROCEDURE — 96372 THER/PROPH/DIAG INJ SC/IM: CPT | Performed by: FAMILY MEDICINE

## 2024-07-31 RX ORDER — CYANOCOBALAMIN 1000 UG/ML
1000 INJECTION, SOLUTION INTRAMUSCULAR; SUBCUTANEOUS
Status: ACTIVE | OUTPATIENT
Start: 2024-07-31 | End: 2025-07-26

## 2024-07-31 RX ADMIN — CYANOCOBALAMIN 1000 MCG: 1000 INJECTION, SOLUTION INTRAMUSCULAR; SUBCUTANEOUS at 15:14

## 2024-08-28 ENCOUNTER — ALLIED HEALTH/NURSE VISIT (OUTPATIENT)
Dept: FAMILY MEDICINE | Facility: CLINIC | Age: 89
End: 2024-08-28

## 2024-08-28 DIAGNOSIS — E53.9 B-COMPLEX DEFICIENCY: Primary | ICD-10-CM

## 2024-08-28 PROCEDURE — 96372 THER/PROPH/DIAG INJ SC/IM: CPT | Performed by: FAMILY MEDICINE

## 2024-08-28 RX ADMIN — CYANOCOBALAMIN 1000 MCG: 1000 INJECTION, SOLUTION INTRAMUSCULAR; SUBCUTANEOUS at 15:26

## 2024-08-28 NOTE — NURSING NOTE
Chief Complaint   Patient presents with    Allied Health Visit     B12 injection, given in Right deltoid

## 2024-09-16 DIAGNOSIS — M48.062 SPINAL STENOSIS OF LUMBAR REGION WITH NEUROGENIC CLAUDICATION: Primary | ICD-10-CM

## 2024-09-17 RX ORDER — ACETAMINOPHEN 325 MG/1
TABLET ORAL
Qty: 56 TABLET | Refills: 11 | Status: SHIPPED | OUTPATIENT
Start: 2024-09-17

## 2024-09-17 RX ORDER — ASPIRIN 81 MG/1
TABLET, COATED ORAL
Qty: 28 TABLET | Refills: 11 | Status: SHIPPED | OUTPATIENT
Start: 2024-09-17

## 2024-09-17 NOTE — TELEPHONE ENCOUNTER
Trisha Lucia is requesting a refill of:    Pending Prescriptions:                       Disp   Refills    ASPIRIN LOW DOSE 81 MG EC tablet [Pharmac*28 tab*11           Si TABLET BY MOUTH ONCE DAILY    acetaminophen (TYLENOL) 325 MG tablet [Ph*56 tab*11           Si TABLETS (650MG) BY MOUTH AT BEDTIME;2 TABLETS           (650MG) BY MOUTH EVERY 6 HOURS AS NEEDED DX PAIN           (MAX APAP 3GM/24HRS)    Pt has OV 10/2/24

## 2024-10-02 ENCOUNTER — OFFICE VISIT (OUTPATIENT)
Dept: FAMILY MEDICINE | Facility: CLINIC | Age: 89
End: 2024-10-02

## 2024-10-02 VITALS
BODY MASS INDEX: 28.84 KG/M2 | WEIGHT: 168 LBS | SYSTOLIC BLOOD PRESSURE: 132 MMHG | TEMPERATURE: 98.3 F | DIASTOLIC BLOOD PRESSURE: 72 MMHG | HEART RATE: 57 BPM | OXYGEN SATURATION: 97 %

## 2024-10-02 DIAGNOSIS — R53.83 FATIGUE, UNSPECIFIED TYPE: ICD-10-CM

## 2024-10-02 DIAGNOSIS — E78.2 MIXED HYPERLIPIDEMIA: ICD-10-CM

## 2024-10-02 DIAGNOSIS — D64.9 LOW HEMOGLOBIN: ICD-10-CM

## 2024-10-02 DIAGNOSIS — E03.9 HYPOTHYROIDISM, UNSPECIFIED TYPE: ICD-10-CM

## 2024-10-02 DIAGNOSIS — E11.40 TYPE 2 DIABETES MELLITUS WITH DIABETIC NEUROPATHY, WITHOUT LONG-TERM CURRENT USE OF INSULIN (H): Primary | ICD-10-CM

## 2024-10-02 DIAGNOSIS — Z23 NEED FOR VACCINATION: ICD-10-CM

## 2024-10-02 DIAGNOSIS — F41.1 GAD (GENERALIZED ANXIETY DISORDER): ICD-10-CM

## 2024-10-02 DIAGNOSIS — I10 ESSENTIAL HYPERTENSION, BENIGN: ICD-10-CM

## 2024-10-02 DIAGNOSIS — G62.9 SMALL FIBER NEUROPATHY: ICD-10-CM

## 2024-10-02 LAB
% GRANULOCYTES: 61.2 %
ALBUMIN (URINE) MG/L: 80
ALBUMIN URINE MG/G CR: ABNORMAL MG/G CREATININE
CREATININE URINE MG/DL: 200 MG/DL
HCT VFR BLD AUTO: 30.5 % (ref 35–47)
HEMOGLOBIN A1C: 6.5 % (ref 4–5.6)
HEMOGLOBIN: 9.8 G/DL (ref 11.7–15.7)
LYMPHOCYTES NFR BLD AUTO: 26.8 %
MCH RBC QN AUTO: 26.8 PG (ref 26–33)
MCHC RBC AUTO-ENTMCNC: 32.1 G/DL (ref 31–36)
MCV RBC AUTO: 83.2 FL (ref 78–100)
MONOCYTES NFR BLD AUTO: 12 %
PLATELET COUNT - QUEST: 235 10^9/L (ref 150–375)
RBC # BLD AUTO: 3.66 10*12/L (ref 3.8–5.2)
WBC # BLD AUTO: 5.4 10*9/L (ref 4–11)

## 2024-10-02 PROCEDURE — 83036 HEMOGLOBIN GLYCOSYLATED A1C: CPT | Performed by: FAMILY MEDICINE

## 2024-10-02 PROCEDURE — 82043 UR ALBUMIN QUANTITATIVE: CPT | Performed by: FAMILY MEDICINE

## 2024-10-02 PROCEDURE — 99214 OFFICE O/P EST MOD 30 MIN: CPT | Performed by: FAMILY MEDICINE

## 2024-10-02 PROCEDURE — 85025 COMPLETE CBC W/AUTO DIFF WBC: CPT | Performed by: FAMILY MEDICINE

## 2024-10-02 PROCEDURE — G0008 ADMIN INFLUENZA VIRUS VAC: HCPCS | Performed by: FAMILY MEDICINE

## 2024-10-02 PROCEDURE — 36415 COLL VENOUS BLD VENIPUNCTURE: CPT | Performed by: FAMILY MEDICINE

## 2024-10-02 PROCEDURE — 90662 IIV NO PRSV INCREASED AG IM: CPT | Performed by: FAMILY MEDICINE

## 2024-10-02 PROCEDURE — 96372 THER/PROPH/DIAG INJ SC/IM: CPT | Performed by: FAMILY MEDICINE

## 2024-10-02 PROCEDURE — 82570 ASSAY OF URINE CREATININE: CPT | Performed by: FAMILY MEDICINE

## 2024-10-02 RX ORDER — NIFEDIPINE 30 MG
30 TABLET, EXTENDED RELEASE ORAL AT BEDTIME
Qty: 90 TABLET | Refills: 1 | Status: SHIPPED | OUTPATIENT
Start: 2024-10-02

## 2024-10-02 RX ORDER — CITALOPRAM HYDROBROMIDE 20 MG/1
20 TABLET ORAL DAILY
Qty: 90 TABLET | Refills: 1 | Status: SHIPPED | OUTPATIENT
Start: 2024-10-02

## 2024-10-02 RX ORDER — LEVOTHYROXINE SODIUM 112 UG/1
TABLET ORAL
Qty: 90 TABLET | Refills: 1 | Status: SHIPPED | OUTPATIENT
Start: 2024-10-02

## 2024-10-02 RX ORDER — PREGABALIN 150 MG/1
150 CAPSULE ORAL 2 TIMES DAILY
Qty: 180 CAPSULE | Refills: 1 | Status: SHIPPED | OUTPATIENT
Start: 2024-10-02

## 2024-10-02 RX ORDER — SIMVASTATIN 20 MG
TABLET ORAL
Qty: 90 TABLET | Refills: 1 | Status: SHIPPED | OUTPATIENT
Start: 2024-10-02

## 2024-10-02 RX ADMIN — CYANOCOBALAMIN 1000 MCG: 1000 INJECTION, SOLUTION INTRAMUSCULAR; SUBCUTANEOUS at 15:53

## 2024-10-02 NOTE — LETTER
October 7, 2024      Trisha Johana Lucia  [address]   [facility] [address]        Dear ,    We are writing to inform you of your test results.    Test results indicate you may require additional follow up, see comment below.    As we discussed on the phone- your hemoglobin and storage iron tests are low- we will start a daily iron pill and recheck in 3-6 months.         Resulted Orders   HEMOGLOBIN A1C (BFP)   Result Value Ref Range    Hemoglobin A1C 6.5 (A) 4 - 5.6 %   ALBUMIN RANDOM URINE QUANTITATIVE (BFP)   Result Value Ref Range    Albumin mg/L 80 (A) 30    Creatinine Urine mg/dL 200 300 mg/dL    Albumin Urine mg/g Cr  (A) 30 MG/G Creatinine   HEMOGRAM PLATELET DIFF (BFP)   Result Value Ref Range    WBC 5.4 4.0 - 11 10*9/L    RBC Count 3.66 (A) 3.8 - 5.2 10*12/L    Hemoglobin 9.8 (A) 11.7 - 15.7 g/dL    Hematocrit 30.5 (A) 35.0 - 47.0 %    MCV 83.2 78 - 100 fL    MCH 26.8 26 - 33 pg    MCHC 32.1 31 - 36 g/dL    Platelet Count 235 150 - 375 10^9/L    % Granulocytes 61.2 %    % Lymphocytes 26.8 %    % Monocytes 12.0 %    Narrative    Ran twice    TSH WITH FREE T4 REFLEX ([facility])   Result Value Ref Range    TSH 0.83 0.40 - 4.50 mIU/L   IRON AND IRON BINDING CAPACITY ([facility])   Result Value Ref Range    Iron 55 45 - 160 mcg/dL    TIBC 331 250 - 450 mcg/dL (calc)    % Saturation 17 16 - 45 % (calc)   FERRITIN ([facility])   Result Value Ref Range    Ferritin 8 (L) 16 - 288 ng/mL       If you have any questions or concerns, please call the clinic at the number listed above.       Sincerely,      Berhane Stevens MD

## 2024-10-02 NOTE — PROGRESS NOTES
"  Assessment & Plan     Type 2 diabetes mellitus with diabetic neuropathy, without long-term current use of insulin (H)  Well controlled, continue current medications at current doses   - HEMOGLOBIN A1C (BFP)  - VENOUS COLLECTION    Mixed hyperlipidemia  Control uncertain, continue current medications at current doses pending labs    Essential hypertension, benign  Well controlled, continue current medications at current doses     SONYA (generalized anxiety disorder)  Well controlled, continue current medications at current doses     Hypothyroidism, unspecified type  stable symptomatically but some fatigue-recheck labs and adjust meds as needed    Small fiber neuropathy  Stable, persistent issue    Need for vaccination      Fatigue, unspecified type  Check CBC and TSH, suspect deconditioning            BMI  Estimated body mass index is 28.84 kg/m  as calculated from the following:    Height as of 3/13/24: 1.626 m (5' 4\").    Weight as of this encounter: 76.2 kg (168 lb).   Weight management plan: Discussed healthy diet and exercise guidelines      FUTURE APPOINTMENTS:       - Follow-up visit in 6 mo  Work on weight loss  Regular exercise    No follow-ups on file.    Subjective   Trisha Vaughn is a 89 year old, presenting for the following health issues:  Recheck Medication (Non-fasting today, refill medications, recheck B12 levels)    HPI       Diabetes Follow-up    How often are you checking your blood sugar? Not at all  What concerns do you have today about your diabetes? None   Do you have any of these symptoms? (Select all that apply)  Burning in feet  Have you had a diabetic eye exam in the last 12 months? yes            Hyperlipidemia Follow-Up    Are you regularly taking any medication or supplement to lower your cholesterol?   Yes- simvastatin  Are you having muscle aches or other side effects that you think could be caused by your cholesterol lowering medication?  No    Hypertension Follow-up    Do you check your " blood pressure regularly outside of the clinic? Yes   Are you following a low salt diet? No  Are your blood pressures ever more than 140 on the top number (systolic) OR more   than 90 on the bottom number (diastolic), for example 140/90? No    BP Readings from Last 2 Encounters:   10/02/24 132/72   03/13/24 136/70     Hemoglobin A1C (%)   Date Value   03/13/2024 6.2 (A)   09/20/2023 5.9   02/01/2023 6.3 (H)   08/31/2022 5.9     LDL Cholesterol Calculated (mg/dL (calc))   Date Value   01/11/2019 70   12/28/2017 76     LDL Cholesterol Direct (mg/dL)   Date Value   08/19/2020 69   01/07/2020 83         Anxiety   How are you doing with your anxiety since your last visit? No change  Are you having other symptoms that might be associated with anxiety? No  Have you had a significant life event? No   Are you feeling depressed? No  Do you have any concerns with your use of alcohol or other drugs? No    Social History     Tobacco Use    Smoking status: Never     Passive exposure: Never    Smokeless tobacco: Never   Substance Use Topics    Alcohol use: No     Alcohol/week: 0.0 standard drinks of alcohol     Comment: 0    Drug use: No         1/11/2019    12:40 PM 8/7/2019     4:24 PM 9/20/2021     5:41 PM   SONYA-7 SCORE   Total Score 3 3 1         8/7/2019     4:24 PM 1/7/2020    12:54 PM 9/20/2021     5:41 PM   PHQ   PHQ-9 Total Score 6 4 10   Q9: Thoughts of better off dead/self-harm past 2 weeks Not at all Not at all Not at all         9/20/2021     5:41 PM   Last PHQ-9   1.  Little interest or pleasure in doing things 2   2.  Feeling down, depressed, or hopeless 1   3.  Trouble falling or staying asleep, or sleeping too much 3   4.  Feeling tired or having little energy 2   5.  Poor appetite or overeating 2   6.  Feeling bad about yourself 0   7.  Trouble concentrating 0   8.  Moving slowly or restless 0   Q9: Thoughts of better off dead/self-harm past 2 weeks 0   PHQ-9 Total Score 10   Difficulty at work, home, or with  people Somewhat difficult         9/20/2021     5:41 PM   SONYA-7    1. Feeling nervous, anxious, or on edge 0   2. Not being able to stop or control worrying 0   3. Worrying too much about different things 0   4. Trouble relaxing 0   5. Being so restless that it is hard to sit still 0   6. Becoming easily annoyed or irritable 0   7. Feeling afraid, as if something awful might happen 1   SONYA-7 Total Score 1   If you checked any problems, how difficult have they made it for you to do your work, take care of things at home, or get along with other people? Not difficult at all     Hypothyroidism Follow-up    Since last visit, patient describes the following symptoms: Weight stable, no hair loss, no skin changes, no constipation, no loose stools-pt states she is more tired of late, some lightheadness  How many servings of fruits and vegetables do you eat daily?  2-3  On average, how many sweetened beverages do you drink each day (Examples: soda, juice, sweet tea, etc.  Do NOT count diet or artificially sweetened beverages)?   1  How many days per week do you exercise enough to make your heart beat faster? 3 or less  How many minutes a day do you exercise enough to make your heart beat faster? 9 or less  How many days per week do you miss taking your medication? 0        Review of Systems  Constitutional, HEENT, cardiovascular, pulmonary, gi and gu systems are negative, except as otherwise noted.      Objective    /72 (BP Location: Right arm, Patient Position: Sitting, Cuff Size: Adult Large)   Pulse 57   Temp 98.3  F (36.8  C) (Temporal)   Wt 76.2 kg (168 lb)   SpO2 97%   BMI 28.84 kg/m    Body mass index is 28.84 kg/m .  Physical Exam   GENERAL: alert and no distress  EYES: Eyes grossly normal to inspection, PERRL and conjunctivae and sclerae normal  HENT: ear canals and TM's normal, nose and mouth without ulcers or lesions  NECK: no adenopathy, no asymmetry, masses, or scars  RESP: lungs clear to auscultation  - no rales, rhonchi or wheezes  CV: regular rate and rhythm, normal S1 S2, no S3 or S4, no murmur, click or rub, no peripheral edema  ABDOMEN: soft, nontender, no hepatosplenomegaly, no masses and bowel sounds normal  MS: no gross musculoskeletal defects noted, no edema  Diabetic foot exam: normal DP and PT pulses, no trophic changes or ulcerative lesions, and reduced sensation at toes    Results for orders placed or performed in visit on 10/02/24 (from the past 24 hour(s))   HEMOGLOBIN A1C (BFP)   Result Value Ref Range    Hemoglobin A1C 6.5 (A) 4 - 5.6 %           Signed Electronically by: Berhane Stevens MD

## 2024-10-02 NOTE — NURSING NOTE
Chief Complaint   Patient presents with    Recheck Medication     Non-fasting today, refill medications, recheck B12 levels     Pre-visit Screening:  Immunizations:  not up to date - tdap at pharmacy  Colonoscopy:  is up to date  Mammogram: is up to date  Asthma Action Test/Plan:  NA  PHQ9:  NA  GAD7:  KAL  Questioned patient about current smoking habits Pt. has never smoked.  Ok to leave detailed message on voice mail for today's visit only Yes, phone # [phone]

## 2024-10-03 LAB — TSH SERPL-ACNC: 0.83 MIU/L (ref 0.4–4.5)

## 2024-10-04 LAB
% SATURATION - QUEST: 17 % (CALC) (ref 16–45)
FERRITIN SERPL-MCNC: 8 NG/ML (ref 16–288)
IRON: 55 MCG/DL (ref 45–160)
TIBC - QUEST: 331 MCG/DL (CALC) (ref 250–450)

## 2024-10-07 ENCOUNTER — TELEPHONE (OUTPATIENT)
Dept: FAMILY MEDICINE | Facility: CLINIC | Age: 89
End: 2024-10-07

## 2024-10-07 DIAGNOSIS — D64.9 LOW HEMOGLOBIN: Primary | ICD-10-CM

## 2024-10-07 RX ORDER — FERROUS SULFATE 325(65) MG
325 TABLET ORAL
Qty: 90 TABLET | Refills: 1 | Status: SHIPPED | OUTPATIENT
Start: 2024-10-07

## 2024-10-07 NOTE — TELEPHONE ENCOUNTER
D/w pt- she is anemic and has low ferritin- we discussed this may be contributing to her fatigue     We will start iron, rx sent, discussed measures to avoid constipation, recheck HGb in 3-6 mo

## 2024-10-25 ENCOUNTER — TRANSFERRED RECORDS (OUTPATIENT)
Dept: FAMILY MEDICINE | Facility: CLINIC | Age: 89
End: 2024-10-25

## 2024-10-30 ENCOUNTER — ALLIED HEALTH/NURSE VISIT (OUTPATIENT)
Dept: FAMILY MEDICINE | Facility: CLINIC | Age: 89
End: 2024-10-30

## 2024-10-30 DIAGNOSIS — E53.9 B-COMPLEX DEFICIENCY: Primary | ICD-10-CM

## 2024-10-30 PROCEDURE — 96372 THER/PROPH/DIAG INJ SC/IM: CPT | Performed by: FAMILY MEDICINE

## 2024-10-30 RX ADMIN — CYANOCOBALAMIN 1000 MCG: 1000 INJECTION, SOLUTION INTRAMUSCULAR; SUBCUTANEOUS at 15:27

## 2024-11-27 ENCOUNTER — ALLIED HEALTH/NURSE VISIT (OUTPATIENT)
Dept: FAMILY MEDICINE | Facility: CLINIC | Age: 89
End: 2024-11-27

## 2024-11-27 DIAGNOSIS — E53.9 B-COMPLEX DEFICIENCY: Primary | ICD-10-CM

## 2024-11-27 RX ADMIN — CYANOCOBALAMIN 1000 MCG: 1000 INJECTION, SOLUTION INTRAMUSCULAR; SUBCUTANEOUS at 15:07

## 2024-12-30 ENCOUNTER — ALLIED HEALTH/NURSE VISIT (OUTPATIENT)
Dept: FAMILY MEDICINE | Facility: CLINIC | Age: 89
End: 2024-12-30

## 2024-12-30 DIAGNOSIS — E53.9 B-COMPLEX DEFICIENCY: Primary | ICD-10-CM

## 2024-12-30 PROCEDURE — 96372 THER/PROPH/DIAG INJ SC/IM: CPT | Performed by: FAMILY MEDICINE

## 2024-12-30 RX ADMIN — CYANOCOBALAMIN 1000 MCG: 1000 INJECTION, SOLUTION INTRAMUSCULAR; SUBCUTANEOUS at 15:04

## 2025-01-06 ENCOUNTER — OFFICE VISIT (OUTPATIENT)
Dept: FAMILY MEDICINE | Facility: CLINIC | Age: OVER 89
End: 2025-01-06

## 2025-01-06 VITALS
DIASTOLIC BLOOD PRESSURE: 60 MMHG | BODY MASS INDEX: 28.15 KG/M2 | RESPIRATION RATE: 18 BRPM | HEART RATE: 60 BPM | TEMPERATURE: 98 F | WEIGHT: 164 LBS | SYSTOLIC BLOOD PRESSURE: 122 MMHG

## 2025-01-06 DIAGNOSIS — R19.5 LOOSE STOOLS: ICD-10-CM

## 2025-01-06 DIAGNOSIS — D50.9 IRON DEFICIENCY ANEMIA, UNSPECIFIED IRON DEFICIENCY ANEMIA TYPE: Primary | ICD-10-CM

## 2025-01-06 DIAGNOSIS — G62.9 SMALL FIBER NEUROPATHY: ICD-10-CM

## 2025-01-06 LAB
% GRANULOCYTES: 66.8 %
HCT VFR BLD AUTO: 38.8 % (ref 35–47)
HEMOGLOBIN: 12.5 G/DL (ref 11.7–15.7)
LYMPHOCYTES NFR BLD AUTO: 21.8 %
MCH RBC QN AUTO: 28.9 PG (ref 26–33)
MCHC RBC AUTO-ENTMCNC: 32.2 G/DL (ref 31–36)
MCV RBC AUTO: 89.7 FL (ref 78–100)
MONOCYTES NFR BLD AUTO: 11.4 %
PLATELET COUNT - QUEST: 266 10^9/L (ref 150–375)
RBC # BLD AUTO: 4.32 10*12/L (ref 3.8–5.2)
WBC # BLD AUTO: 6.6 10*9/L (ref 4–11)

## 2025-01-06 PROCEDURE — 85025 COMPLETE CBC W/AUTO DIFF WBC: CPT | Performed by: FAMILY MEDICINE

## 2025-01-06 PROCEDURE — 99214 OFFICE O/P EST MOD 30 MIN: CPT | Performed by: FAMILY MEDICINE

## 2025-01-06 PROCEDURE — 36415 COLL VENOUS BLD VENIPUNCTURE: CPT | Performed by: FAMILY MEDICINE

## 2025-01-06 NOTE — PROGRESS NOTES
SUBJECTIVE:  Trisha Lucia, a 89 year old female scheduled an appointment to discuss the following issues:     Iron deficiency anemia, unspecified iron deficiency anemia type  Small fiber neuropathy  Loose stools  Pt here for recheck iron levels-has had a cold for 2 weeks , more fatigued, taking iron daily as prescribed.    Pt also requests B12 check, gets injections monthly    Pt also notes loose stools for last 3-4 weeks.  Pt is relatively contipated at baseline, stooling once every 3 dys but states now when she has BM it will be loose and comes quickly.  No daily diarrhea or nausea or vomiting , no fevers    No new meds    Pt eats at her assisted living facility- does eat dairy and little fiber     Medical, social, surgical, and family histories reviewed.    Patient Active Problem List   Diagnosis    Essential hypertension, benign    Other specified idiopathic peripheral neuropathy    Hypothyroidism    Osteoarthrosis, unspecified whether generalized or localized, involving lower leg    Obesity    Irritable bowel syndrome    Mixed hyperlipidemia    ANNA (obstructive sleep apnea)    B-complex deficiency    Hallux valgus with bunions    ACP (advance care planning)    Type 2 diabetes mellitus with diabetic neuropathy (H)    Pernicious anemia    Spinal stenosis    SONYA (generalized anxiety disorder)    Septic shock (H)    Lactic acidemia    Infectious encephalopathy    Pneumonia of both lower lobes due to infectious organism       Past Medical History:   Diagnosis Date    Cellulitis and abscess of foot 5/30/2008    DIABETES MELLITUS TYPE II-UNCOMPL 10/24/2007    Essential hypertension, benign     History of small bowel obstruction 6/27/2011 6/16/2011 She had a CT of the abdomen in the emergency room which was suggestive of a small-bowel obstruction. Her lactic acid level was elevated. Surgery was consulted. The patient was put on IV fluids and pain medications. After surgical evaluation, she underwent surgery and  had a laparotomy with lysis of adhesive band and small bowel resection with primary enteroenterostomy and appendectomy. During her hospital course, the patient had a good recovery.     Mucous polyp of cervix 9/28/2004    Open wound of foot excluding toes 4/3/2013     Problem list name updated by automated process. Provider to review    Other abnormal glucose     Other specified idiopathic peripheral neuropathy     Sleep apnea     Unspecified hypothyroidism        Family History   Problem Relation Age of Onset    C.A.D. Mother     Diabetes No family hx of     Hypertension Mother     Breast Cancer No family hx of     Cancer - colorectal No family hx of        Social History     Socioeconomic History    Marital status:      Spouse name: Duane    Number of children: 4    Years of education: 16    Highest education level: Not on file   Occupational History    Occupation: School earline     Employer: [employer]     Employer: [employer]   Tobacco Use    Smoking status: Never     Passive exposure: Never    Smokeless tobacco: Never   Substance and Sexual Activity    Alcohol use: No     Alcohol/week: 0.0 standard drinks of alcohol     Comment: 0    Drug use: No    Sexual activity: Yes     Partners: Male   Other Topics Concern     Service Not Asked    Blood Transfusions Not Asked    Caffeine Concern Not Asked    Occupational Exposure Not Asked    Hobby Hazards Not Asked    Sleep Concern Not Asked    Stress Concern Not Asked    Weight Concern Not Asked    Special Diet Not Asked    Back Care Not Asked    Exercise Yes    Bike Helmet Not Asked    Seat Belt Yes    Self-Exams Yes    Parent/sibling w/ CABG, MI or angioplasty before 65F 55M? Not Asked   Social History Narrative    Not on file     Social Drivers of Health     Financial Resource Strain: Not on file   Food Insecurity: Not on file   Transportation Needs: Not on file   Physical Activity: Not on file   Stress: Not on file   Social Connections: Not on  file   Interpersonal Safety: Not on file   Housing Stability: Not on file       Past Surgical History:   Procedure Laterality Date    BUNIONECTOMY  4/9/2013    Procedure: BUNIONECTOMY;  RIGHT GREAT CLAWTOE CORRECTION WITH TENDON TRANSFER, ENDOSCOPIC RECESSION OF GASTRONEMIUS, DEBRIDEMENT OF ULCER ON SOLE OF RIGHT FOOT;  Surgeon: Stephon Rae MD;  Location: [facility]    ENDOSCOPIC RECESSION GASTROCNEMIUS (DONTA)  4/9/2013    Procedure: ENDOSCOPIC RECESSION GASTROCNEMIUS (DONTA);;  Surgeon: Stephon Rae MD;  Location: [facility]    HC DEBRIDMENT MASTOID CAVITY, SIMPLE      L ear    HC KNEE SCOPE, DIAGNOSTIC  1997    Arthroscopy, Knee    HC KNEE SCOPE, DIAGNOSTIC  2001    Arthroscopy, Knee    HC OPEN TX TRIMALLEOLAR ANKLE FX W FIX PST LIP Right 2014    HC REPAIR OF HAMMERTOE,ONE  2015    [facility]    HERNIORRHAPHY INCISIONAL (LOCATION) N/A 1/13/2017    Procedure: HERNIORRHAPHY INCISIONAL (LOCATION);  Surgeon: Joaquin Skaggs MD;  Location: [facility]    IR LUMBAR PUNCTURE  7/25/2018    IRRIGATION AND DEBRIDEMENT FOOT, COMBINED  4/9/2013    Procedure: COMBINED IRRIGATION AND DEBRIDEMENT FOOT;  Debridement of sole ulcer right foot;  Surgeon: Stephon Rae MD;  Location: [facility]    KNEE SURGERY  2011    right total-Dr. Gorman    Small bowel obstruction  6/2011    Small-bowel obstruction, status post laparotomy with lysis of adhesions, small bowel resection with primary enteroenterostomy and appendectomy    [facility]  [facility]       Current Outpatient Medications   Medication Sig Dispense Refill    acetaminophen (TYLENOL) 325 MG tablet 2 TABLETS (650MG) BY MOUTH AT BEDTIME;2 TABLETS (650MG) BY MOUTH EVERY 6 HOURS AS NEEDED DX PAIN (MAX APAP 3GM/24HRS) 56 tablet 11    ASPIRIN LOW DOSE 81 MG EC tablet 1 TABLET BY MOUTH ONCE DAILY 28 tablet 11    citalopram (CELEXA) 20 MG tablet Take 1 tablet (20 mg) by mouth daily. 90 tablet 1    ferrous sulfate (FEROSUL) 325 (65 Fe) MG  tablet Take 1 tablet (325 mg) by mouth daily (with breakfast). 90 tablet 1    latanoprost (XALATAN) 0.005 % ophthalmic solution INSTILL 1 DROP INTO EACH EYE AT BEDTIME.      levothyroxine (SYNTHROID/LEVOTHROID) 112 MCG tablet TAKE ONE TABLET BY MOUTH ONE TIME DAILY 90 tablet 1    metFORMIN (GLUCOPHAGE) 1000 MG tablet Take 1 tablet (1,000 mg) by mouth 2 times daily (with meals). 180 tablet 1    Multiple Vitamins-Minerals (SENIOR MULTIVITAMIN PLUS) TABS Take 1 tablet by mouth daily       NIFEdipine ER (ADALAT CC) 30 MG 24 hr tablet Take 1 tablet (30 mg) by mouth at bedtime. 90 tablet 1    pregabalin (LYRICA) 150 MG capsule Take 1 capsule (150 mg) by mouth 2 times daily. 180 capsule 1    simvastatin (ZOCOR) 20 MG tablet TAKE 1 TABLET (20 MG) BY MOUTH AT BEDTIME 90 tablet 1     Current Facility-Administered Medications   Medication Dose Route Frequency Provider Last Rate Last Admin    cyanocobalamin injection 1,000 mcg  1,000 mcg Intramuscular Q30 Days Berhane Stevens MD   1,000 mcg at 12/30/24 1504        Allergies: Cephalexin, Fish allergy, Penicillins, Percocet [oxycodone-acetaminophen], Shellfish allergy, and Sulfa antibiotics      Immunization History   Administered Date(s) Administered    COVID-19 Bivalent 12+ (Pfizer) 09/21/2022    COVID-19 MONOVALENT 12+ (Pfizer) 02/17/2021, 03/10/2021, 10/19/2021    Influenza (H1N1) 12/22/2009    Influenza (High Dose) Trivalent,PF (Fluzone) 09/24/2021, 10/02/2024    Influenza (IIV3) PF 10/08/2007, 10/30/2008, 09/16/2009, 11/26/2010, 10/11/2011, 11/20/2012    Influenza Vaccine 18-64 (Flublok) 09/30/2020    Influenza Vaccine 65+ (Fluzone HD) 09/24/2021, 09/21/2022    Influenza Vaccine >6 months,quad, PF 09/26/2013, 09/22/2014, 11/10/2015, 10/03/2016, 09/13/2017, 10/18/2018, 09/11/2019, 09/30/2020    Influenza Vaccine, 6+MO IM (QUADRIVALENT W/PRESERVATIVES) 10/13/2023    Pneumo Conj 13-V (2010&after) 02/01/2016    Pneumococcal 23 valent 06/19/2003, 06/19/2011    RSV  Vaccine (Abrysvo) 10/13/2023    TD,PF 7+ (Tenivac) 01/01/2003    TDAP Vaccine (Boostrix) 11/20/2012    Zoster recombinant adjuvanted (SHINGRIX) 04/02/2018, 08/20/2018    Zoster vaccine, live 01/07/2009        ROS:  CONSTITUTIONAL: NEGATIVE for fever, chills  EYES: NEGATIVE for vision changes   RESP: NEGATIVE for significant cough or SOB  CV: NEGATIVE for chest pain, palpitations   : NEGATIVE for frequency, dysuria, or hematuria  MUSCULOSKELETAL: NEGATIVE for significant arthralgias or myalgia  NEURO: NEGATIVE for weakness, dizziness or paresthesias or headache    OBJECTIVE:  /60 (BP Location: Right arm, Patient Position: Chair, Cuff Size: Adult Regular)   Pulse 60   Temp 98  F (36.7  C) (Temporal)   Resp 18   Wt 74.4 kg (164 lb)   BMI 28.15 kg/m    EXAM:  GENERAL APPEARANCE: healthy, alert and no distress  EYES: EOMI,  PERRL  HENT: ear canals and TM's normal and nose and mouth without ulcers or lesions  RESP: lungs clear to auscultation - no rales, rhonchi or wheezes  CV: regular rates and rhythm, normal S1 S2, no S3 or S4 and no murmur, click or rub -  ABDOMEN:  soft, nontender, no HSM or masses and bowel sounds normal    ASSESSMENT/PLAN:  (D50.9) Iron deficiency anemia, unspecified iron deficiency anemia type  (primary encounter diagnosis)  Comment: recheck HGb and ferritin  Plan: continue current medications at current doses pending labs    (G62.9) Small fiber neuropathy  Comment: stable  Plan: continue B12 pending labs    (R19.5) Loose stools  Comment: not frequent diarrhea but persistent every 3 days for several weeks. -no clear triggers, discussed constipation and iron but she does not feel constipated-? Microscopic colitis etc   Plan: we agree to start by limiting dairy and taking daily fiber supplement, if not improving check C Diff and stool culture    Consider GI referral if persists    06-Apr-2017 19:45

## 2025-01-06 NOTE — LETTER
January 7, 2025      Trisha Johana COPE Rea  [address]   [facility] [address]        Dear ,    We are writing to inform you of your test results.    Your test results fall within the expected range(s) or remain unchanged from previous results.  Please continue with current treatment plan. Iron levels (storage) and hemoglobin look much improved. I would keep taking the iron supplement until refill gone.  B12 level is high but OK, no need to adjust dosing.     Resulted Orders   VITAMIN B12 ([facility])   Result Value Ref Range    Vitamin B12 1,011 200 - 1,100 pg/mL   HEMOGRAM PLATELET DIFF ([facility])   Result Value Ref Range    WBC 6.6 4.0 - 11 10*9/L    RBC Count 4.32 3.8 - 5.2 10*12/L    Hemoglobin 12.5 11.7 - 15.7 g/dL    Hematocrit 38.8 35.0 - 47.0 %    MCV 89.7 78 - 100 fL    MCH 28.9 26 - 33 pg    MCHC 32.2 31 - 36 g/dL    Platelet Count 266 150 - 375 10^9/L    % Granulocytes 66.8 %    % Lymphocytes 21.8 %    % Monocytes 11.4 %   FERRITIN ([facility])   Result Value Ref Range    Ferritin 28 16 - 288 ng/mL       If you have any questions or concerns, please call the clinic at the number listed above.       Sincerely,      Berhane Stevens MD    Electronically signed

## 2025-01-06 NOTE — NURSING NOTE
Trisha Johana Lucia is here for loose stools for the oast 3 weeks. Happens every few days, will have to go suddenly.  Also recheck of her blood work for anemia    Questioned patient about current smoking habits.  Pt. has never smoked.  PULSE regular  My Chart: declines  CLASSIFICATION OF OVERWEIGHT AND OBESITY BY BMI                        Obesity Class           BMI(kg/m2)  Underweight                                    < 18.5  Normal                                         18.5-24.9  Overweight                                     25.0-29.9  OBESITY                     I                  30.0-34.9                             II                 35.0-39.9  EXTREME OBESITY             III                >40                            Patient's  BMI Body mass index is 28.15 kg/m .  http://hin.nhlbi.nih.gov/menuplanner/menu.cgi  Pre-visit planning  Immunizations - up to date  Colonoscopy -   Mammogram -   Asthma -   PHQ9 -    SONYA-7 -      The patient has verbalized that it is ok to leave a detailed voice message on the patient's cell phone with results/recommendations from this visit.     
150

## 2025-01-07 LAB
FERRITIN SERPL-MCNC: 28 NG/ML (ref 16–288)
VIT B12 SERPL-MCNC: 1011 PG/ML (ref 200–1100)

## 2025-01-29 ENCOUNTER — ALLIED HEALTH/NURSE VISIT (OUTPATIENT)
Dept: FAMILY MEDICINE | Facility: CLINIC | Age: OVER 89
End: 2025-01-29

## 2025-01-29 DIAGNOSIS — D50.9 IRON DEFICIENCY ANEMIA, UNSPECIFIED IRON DEFICIENCY ANEMIA TYPE: Primary | ICD-10-CM

## 2025-01-29 PROCEDURE — 96372 THER/PROPH/DIAG INJ SC/IM: CPT | Performed by: FAMILY MEDICINE

## 2025-01-29 RX ADMIN — CYANOCOBALAMIN 1000 MCG: 1000 INJECTION, SOLUTION INTRAMUSCULAR; SUBCUTANEOUS at 15:16

## 2025-02-26 ENCOUNTER — ALLIED HEALTH/NURSE VISIT (OUTPATIENT)
Dept: FAMILY MEDICINE | Facility: CLINIC | Age: OVER 89
End: 2025-02-26

## 2025-02-26 DIAGNOSIS — D50.9 IRON DEFICIENCY ANEMIA, UNSPECIFIED IRON DEFICIENCY ANEMIA TYPE: Primary | ICD-10-CM

## 2025-02-26 PROCEDURE — 96372 THER/PROPH/DIAG INJ SC/IM: CPT | Performed by: FAMILY MEDICINE

## 2025-02-26 RX ADMIN — CYANOCOBALAMIN 1000 MCG: 1000 INJECTION, SOLUTION INTRAMUSCULAR; SUBCUTANEOUS at 15:23

## 2025-03-17 DIAGNOSIS — G62.9 SMALL FIBER NEUROPATHY: ICD-10-CM

## 2025-03-17 RX ORDER — PREGABALIN 150 MG/1
150 CAPSULE ORAL 2 TIMES DAILY
Qty: 180 CAPSULE | Refills: 1 | Status: SHIPPED | OUTPATIENT
Start: 2025-03-17

## 2025-03-17 NOTE — TELEPHONE ENCOUNTER
Trisha Lucia is requesting a refill of:    Pending Prescriptions:                       Disp   Refills    pregabalin (LYRICA) 150 MG capsule        180 ca*1            Sig: Take 1 capsule (150 mg) by mouth 2 times daily.    Please close encounter if RX was sent. Thanks, Destiny

## 2025-03-26 ENCOUNTER — ALLIED HEALTH/NURSE VISIT (OUTPATIENT)
Dept: FAMILY MEDICINE | Facility: CLINIC | Age: OVER 89
End: 2025-03-26

## 2025-03-26 DIAGNOSIS — E53.9 B-COMPLEX DEFICIENCY: Primary | ICD-10-CM

## 2025-03-26 PROCEDURE — 96372 THER/PROPH/DIAG INJ SC/IM: CPT | Performed by: FAMILY MEDICINE

## 2025-03-26 RX ADMIN — CYANOCOBALAMIN 1000 MCG: 1000 INJECTION, SOLUTION INTRAMUSCULAR; SUBCUTANEOUS at 15:14

## 2025-08-04 ENCOUNTER — APPOINTMENT (OUTPATIENT)
Dept: GENERAL RADIOLOGY | Facility: CLINIC | Age: OVER 89
DRG: 521 | End: 2025-08-04
Attending: EMERGENCY MEDICINE
Payer: COMMERCIAL

## 2025-08-04 ENCOUNTER — HOSPITAL ENCOUNTER (INPATIENT)
Facility: CLINIC | Age: OVER 89
DRG: 521 | End: 2025-08-04
Attending: EMERGENCY MEDICINE | Admitting: INTERNAL MEDICINE
Payer: COMMERCIAL

## 2025-08-04 ENCOUNTER — APPOINTMENT (OUTPATIENT)
Dept: CT IMAGING | Facility: CLINIC | Age: OVER 89
DRG: 521 | End: 2025-08-04
Attending: INTERNAL MEDICINE
Payer: COMMERCIAL

## 2025-08-04 DIAGNOSIS — Z71.89 OTHER SPECIFIED COUNSELING: Chronic | ICD-10-CM

## 2025-08-04 DIAGNOSIS — N39.0 URINARY TRACT INFECTION WITHOUT HEMATURIA, SITE UNSPECIFIED: ICD-10-CM

## 2025-08-04 DIAGNOSIS — S72.001A CLOSED FRACTURE OF NECK OF RIGHT FEMUR, INITIAL ENCOUNTER (H): Primary | ICD-10-CM

## 2025-08-04 DIAGNOSIS — G62.9 SMALL FIBER NEUROPATHY: ICD-10-CM

## 2025-08-04 LAB
ANION GAP SERPL CALCULATED.3IONS-SCNC: 15 MMOL/L (ref 7–15)
BASOPHILS # BLD AUTO: 0 10E3/UL (ref 0–0.2)
BASOPHILS NFR BLD AUTO: 0 %
BUN SERPL-MCNC: 17.4 MG/DL (ref 8–23)
CALCIUM SERPL-MCNC: 9.5 MG/DL (ref 8.8–10.4)
CHLORIDE SERPL-SCNC: 100 MMOL/L (ref 98–107)
CK SERPL-CCNC: 78 U/L (ref 26–192)
CREAT SERPL-MCNC: 0.72 MG/DL (ref 0.51–0.95)
EGFRCR SERPLBLD CKD-EPI 2021: 79 ML/MIN/1.73M2
EOSINOPHIL # BLD AUTO: 0.1 10E3/UL (ref 0–0.7)
EOSINOPHIL NFR BLD AUTO: 1 %
ERYTHROCYTE [DISTWIDTH] IN BLOOD BY AUTOMATED COUNT: 13.2 % (ref 10–15)
GLUCOSE SERPL-MCNC: 126 MG/DL (ref 70–99)
HCO3 SERPL-SCNC: 24 MMOL/L (ref 22–29)
HCT VFR BLD AUTO: 40.6 % (ref 35–47)
HGB BLD-MCNC: 14 G/DL (ref 11.7–15.7)
IMM GRANULOCYTES # BLD: 0.1 10E3/UL
IMM GRANULOCYTES NFR BLD: 1 %
LYMPHOCYTES # BLD AUTO: 1 10E3/UL (ref 0.8–5.3)
LYMPHOCYTES NFR BLD AUTO: 8 %
MAGNESIUM SERPL-MCNC: 1.5 MG/DL (ref 1.7–2.3)
MCH RBC QN AUTO: 31.3 PG (ref 26.5–33)
MCHC RBC AUTO-ENTMCNC: 34.5 G/DL (ref 31.5–36.5)
MCV RBC AUTO: 91 FL (ref 78–100)
MONOCYTES # BLD AUTO: 0.9 10E3/UL (ref 0–1.3)
MONOCYTES NFR BLD AUTO: 8 %
NEUTROPHILS # BLD AUTO: 9.9 10E3/UL (ref 1.6–8.3)
NEUTROPHILS NFR BLD AUTO: 83 %
NRBC # BLD AUTO: 0 10E3/UL
NRBC BLD AUTO-RTO: 0 /100
PLATELET # BLD AUTO: 192 10E3/UL (ref 150–450)
POTASSIUM SERPL-SCNC: 4.3 MMOL/L (ref 3.4–5.3)
RBC # BLD AUTO: 4.47 10E6/UL (ref 3.8–5.2)
SODIUM SERPL-SCNC: 139 MMOL/L (ref 135–145)
WBC # BLD AUTO: 12 10E3/UL (ref 4–11)

## 2025-08-04 PROCEDURE — 99223 1ST HOSP IP/OBS HIGH 75: CPT | Performed by: INTERNAL MEDICINE

## 2025-08-04 PROCEDURE — 250N000013 HC RX MED GY IP 250 OP 250 PS 637: Performed by: EMERGENCY MEDICINE

## 2025-08-04 PROCEDURE — 93005 ELECTROCARDIOGRAM TRACING: CPT

## 2025-08-04 PROCEDURE — 71045 X-RAY EXAM CHEST 1 VIEW: CPT

## 2025-08-04 PROCEDURE — 73630 X-RAY EXAM OF FOOT: CPT | Mod: RT

## 2025-08-04 PROCEDURE — 99285 EMERGENCY DEPT VISIT HI MDM: CPT | Mod: 25

## 2025-08-04 PROCEDURE — 96374 THER/PROPH/DIAG INJ IV PUSH: CPT | Mod: 59

## 2025-08-04 PROCEDURE — 82565 ASSAY OF CREATININE: CPT | Performed by: EMERGENCY MEDICINE

## 2025-08-04 PROCEDURE — 72170 X-RAY EXAM OF PELVIS: CPT

## 2025-08-04 PROCEDURE — 73552 X-RAY EXAM OF FEMUR 2/>: CPT | Mod: RT

## 2025-08-04 PROCEDURE — 120N000001 HC R&B MED SURG/OB

## 2025-08-04 PROCEDURE — 999N000157 HC STATISTIC RCP TIME EA 10 MIN

## 2025-08-04 PROCEDURE — 85004 AUTOMATED DIFF WBC COUNT: CPT | Performed by: EMERGENCY MEDICINE

## 2025-08-04 PROCEDURE — 82550 ASSAY OF CK (CPK): CPT | Performed by: EMERGENCY MEDICINE

## 2025-08-04 PROCEDURE — 999N000156 HC STATISTIC RCP CONSULT EA 30 MIN

## 2025-08-04 PROCEDURE — 250N000011 HC RX IP 250 OP 636: Performed by: INTERNAL MEDICINE

## 2025-08-04 PROCEDURE — 36415 COLL VENOUS BLD VENIPUNCTURE: CPT | Performed by: EMERGENCY MEDICINE

## 2025-08-04 PROCEDURE — 70450 CT HEAD/BRAIN W/O DYE: CPT

## 2025-08-04 PROCEDURE — 250N000011 HC RX IP 250 OP 636: Performed by: EMERGENCY MEDICINE

## 2025-08-04 PROCEDURE — 83735 ASSAY OF MAGNESIUM: CPT | Performed by: EMERGENCY MEDICINE

## 2025-08-04 RX ORDER — ONDANSETRON 2 MG/ML
4 INJECTION INTRAMUSCULAR; INTRAVENOUS EVERY 6 HOURS PRN
Status: DISCONTINUED | OUTPATIENT
Start: 2025-08-04 | End: 2025-08-11 | Stop reason: HOSPADM

## 2025-08-04 RX ORDER — SODIUM CHLORIDE, SODIUM LACTATE, POTASSIUM CHLORIDE, CALCIUM CHLORIDE 600; 310; 30; 20 MG/100ML; MG/100ML; MG/100ML; MG/100ML
INJECTION, SOLUTION INTRAVENOUS CONTINUOUS
Status: DISCONTINUED | OUTPATIENT
Start: 2025-08-04 | End: 2025-08-07

## 2025-08-04 RX ORDER — NIFEDIPINE 30 MG/1
30 TABLET, EXTENDED RELEASE ORAL AT BEDTIME
Status: DISCONTINUED | OUTPATIENT
Start: 2025-08-04 | End: 2025-08-11 | Stop reason: HOSPADM

## 2025-08-04 RX ORDER — PREGABALIN 75 MG/1
150 CAPSULE ORAL 2 TIMES DAILY
Status: DISCONTINUED | OUTPATIENT
Start: 2025-08-05 | End: 2025-08-10

## 2025-08-04 RX ORDER — ACETAMINOPHEN 325 MG/1
975 TABLET ORAL 3 TIMES DAILY
Status: DISCONTINUED | OUTPATIENT
Start: 2025-08-05 | End: 2025-08-11 | Stop reason: HOSPADM

## 2025-08-04 RX ORDER — NICOTINE POLACRILEX 4 MG
15-30 LOZENGE BUCCAL
Status: DISCONTINUED | OUTPATIENT
Start: 2025-08-04 | End: 2025-08-06

## 2025-08-04 RX ORDER — MAGNESIUM SULFATE HEPTAHYDRATE 40 MG/ML
2 INJECTION, SOLUTION INTRAVENOUS ONCE
Status: COMPLETED | OUTPATIENT
Start: 2025-08-04 | End: 2025-08-05

## 2025-08-04 RX ORDER — AMOXICILLIN 250 MG
1 CAPSULE ORAL 2 TIMES DAILY PRN
Status: DISCONTINUED | OUTPATIENT
Start: 2025-08-04 | End: 2025-08-11 | Stop reason: HOSPADM

## 2025-08-04 RX ORDER — ASPIRIN 81 MG/1
81 TABLET ORAL DAILY
Status: DISCONTINUED | OUTPATIENT
Start: 2025-08-05 | End: 2025-08-07

## 2025-08-04 RX ORDER — HYDROMORPHONE HCL IN WATER/PF 6 MG/30 ML
0.4 PATIENT CONTROLLED ANALGESIA SYRINGE INTRAVENOUS
Status: DISCONTINUED | OUTPATIENT
Start: 2025-08-04 | End: 2025-08-06

## 2025-08-04 RX ORDER — POLYETHYLENE GLYCOL 3350 17 G/17G
17 POWDER, FOR SOLUTION ORAL 2 TIMES DAILY PRN
Status: DISCONTINUED | OUTPATIENT
Start: 2025-08-04 | End: 2025-08-11 | Stop reason: HOSPADM

## 2025-08-04 RX ORDER — HYDROMORPHONE HYDROCHLORIDE 2 MG/1
2 TABLET ORAL
Status: DISCONTINUED | OUTPATIENT
Start: 2025-08-04 | End: 2025-08-06

## 2025-08-04 RX ORDER — FENTANYL CITRATE 50 UG/ML
0.5 INJECTION, SOLUTION INTRAMUSCULAR; INTRAVENOUS ONCE
Status: COMPLETED | OUTPATIENT
Start: 2025-08-04 | End: 2025-08-04

## 2025-08-04 RX ORDER — HYDROMORPHONE HCL IN WATER/PF 6 MG/30 ML
0.2 PATIENT CONTROLLED ANALGESIA SYRINGE INTRAVENOUS
Status: DISCONTINUED | OUTPATIENT
Start: 2025-08-04 | End: 2025-08-06

## 2025-08-04 RX ORDER — FERROUS SULFATE 325(65) MG
325 TABLET ORAL
Status: DISCONTINUED | OUTPATIENT
Start: 2025-08-05 | End: 2025-08-11 | Stop reason: HOSPADM

## 2025-08-04 RX ORDER — ONDANSETRON 4 MG/1
4 TABLET, ORALLY DISINTEGRATING ORAL EVERY 6 HOURS PRN
Status: DISCONTINUED | OUTPATIENT
Start: 2025-08-04 | End: 2025-08-11 | Stop reason: HOSPADM

## 2025-08-04 RX ORDER — DEXTROSE MONOHYDRATE 25 G/50ML
25-50 INJECTION, SOLUTION INTRAVENOUS
Status: DISCONTINUED | OUTPATIENT
Start: 2025-08-04 | End: 2025-08-06

## 2025-08-04 RX ORDER — CITALOPRAM HYDROBROMIDE 20 MG/1
20 TABLET ORAL DAILY
Status: DISCONTINUED | OUTPATIENT
Start: 2025-08-05 | End: 2025-08-11 | Stop reason: HOSPADM

## 2025-08-04 RX ORDER — LIDOCAINE 40 MG/G
CREAM TOPICAL
Status: DISCONTINUED | OUTPATIENT
Start: 2025-08-04 | End: 2025-08-06

## 2025-08-04 RX ORDER — FENTANYL CITRATE 50 UG/ML
25 INJECTION, SOLUTION INTRAMUSCULAR; INTRAVENOUS ONCE
Status: DISCONTINUED | OUTPATIENT
Start: 2025-08-04 | End: 2025-08-04

## 2025-08-04 RX ORDER — AMOXICILLIN 250 MG
2 CAPSULE ORAL 2 TIMES DAILY PRN
Status: DISCONTINUED | OUTPATIENT
Start: 2025-08-04 | End: 2025-08-11 | Stop reason: HOSPADM

## 2025-08-04 RX ORDER — CALCIUM CARBONATE 500 MG/1
1000 TABLET, CHEWABLE ORAL 4 TIMES DAILY PRN
Status: DISCONTINUED | OUTPATIENT
Start: 2025-08-04 | End: 2025-08-11 | Stop reason: HOSPADM

## 2025-08-04 RX ORDER — LEVOTHYROXINE SODIUM 112 UG/1
112 TABLET ORAL
Status: DISCONTINUED | OUTPATIENT
Start: 2025-08-05 | End: 2025-08-11 | Stop reason: HOSPADM

## 2025-08-04 RX ORDER — ACETAMINOPHEN 500 MG
1000 TABLET ORAL ONCE
Status: COMPLETED | OUTPATIENT
Start: 2025-08-04 | End: 2025-08-04

## 2025-08-04 RX ORDER — LATANOPROST 50 UG/ML
1 SOLUTION/ DROPS OPHTHALMIC AT BEDTIME
Status: DISCONTINUED | OUTPATIENT
Start: 2025-08-05 | End: 2025-08-11 | Stop reason: HOSPADM

## 2025-08-04 RX ORDER — SIMVASTATIN 20 MG
20 TABLET ORAL AT BEDTIME
Status: DISCONTINUED | OUTPATIENT
Start: 2025-08-05 | End: 2025-08-11 | Stop reason: HOSPADM

## 2025-08-04 RX ADMIN — FENTANYL CITRATE 37 MCG: 50 INJECTION, SOLUTION INTRAMUSCULAR; INTRAVENOUS at 20:42

## 2025-08-04 RX ADMIN — HYDROMORPHONE HYDROCHLORIDE 0.2 MG: 0.2 INJECTION, SOLUTION INTRAMUSCULAR; INTRAVENOUS; SUBCUTANEOUS at 23:31

## 2025-08-04 RX ADMIN — ACETAMINOPHEN 1000 MG: 500 TABLET, FILM COATED ORAL at 20:43

## 2025-08-04 ASSESSMENT — ACTIVITIES OF DAILY LIVING (ADL)
ADLS_ACUITY_SCORE: 54

## 2025-08-05 ENCOUNTER — APPOINTMENT (OUTPATIENT)
Dept: CARDIOLOGY | Facility: CLINIC | Age: OVER 89
DRG: 521 | End: 2025-08-05
Attending: INTERNAL MEDICINE
Payer: COMMERCIAL

## 2025-08-05 LAB
ABO + RH BLD: NORMAL
ALBUMIN UR-MCNC: NEGATIVE MG/DL
ANION GAP SERPL CALCULATED.3IONS-SCNC: 13 MMOL/L (ref 7–15)
APPEARANCE UR: CLEAR
ATRIAL RATE - MUSE: 59 BPM
BACTERIA #/AREA URNS HPF: ABNORMAL /HPF
BILIRUB UR QL STRIP: NEGATIVE
BLD GP AB SCN SERPL QL: NEGATIVE
BUN SERPL-MCNC: 17.3 MG/DL (ref 8–23)
CALCIUM SERPL-MCNC: 9 MG/DL (ref 8.8–10.4)
CHLORIDE SERPL-SCNC: 99 MMOL/L (ref 98–107)
COLOR UR AUTO: YELLOW
CREAT SERPL-MCNC: 0.64 MG/DL (ref 0.51–0.95)
DIASTOLIC BLOOD PRESSURE - MUSE: NORMAL MMHG
EGFRCR SERPLBLD CKD-EPI 2021: 84 ML/MIN/1.73M2
ERYTHROCYTE [DISTWIDTH] IN BLOOD BY AUTOMATED COUNT: 13.1 % (ref 10–15)
GLUCOSE BLDC GLUCOMTR-MCNC: 100 MG/DL (ref 70–99)
GLUCOSE BLDC GLUCOMTR-MCNC: 110 MG/DL (ref 70–99)
GLUCOSE BLDC GLUCOMTR-MCNC: 126 MG/DL (ref 70–99)
GLUCOSE BLDC GLUCOMTR-MCNC: 130 MG/DL (ref 70–99)
GLUCOSE BLDC GLUCOMTR-MCNC: 135 MG/DL (ref 70–99)
GLUCOSE BLDC GLUCOMTR-MCNC: 174 MG/DL (ref 70–99)
GLUCOSE SERPL-MCNC: 131 MG/DL (ref 70–99)
GLUCOSE UR STRIP-MCNC: NEGATIVE MG/DL
HCO3 SERPL-SCNC: 24 MMOL/L (ref 22–29)
HCT VFR BLD AUTO: 39.9 % (ref 35–47)
HGB BLD-MCNC: 13.5 G/DL (ref 11.7–15.7)
HGB UR QL STRIP: NEGATIVE
INR PPP: 1.02 (ref 0.85–1.15)
INTERPRETATION ECG - MUSE: NORMAL
KETONES UR STRIP-MCNC: 20 MG/DL
LEUKOCYTE ESTERASE UR QL STRIP: ABNORMAL
LVEF ECHO: NORMAL
MAGNESIUM SERPL-MCNC: 1.9 MG/DL (ref 1.7–2.3)
MCH RBC QN AUTO: 30.8 PG (ref 26.5–33)
MCHC RBC AUTO-ENTMCNC: 33.8 G/DL (ref 31.5–36.5)
MCV RBC AUTO: 91 FL (ref 78–100)
MUCOUS THREADS #/AREA URNS LPF: PRESENT /LPF
NITRATE UR QL: POSITIVE
P AXIS - MUSE: 29 DEGREES
PH UR STRIP: 5 [PH] (ref 5–7)
PLATELET # BLD AUTO: 170 10E3/UL (ref 150–450)
POTASSIUM SERPL-SCNC: 4 MMOL/L (ref 3.4–5.3)
PR INTERVAL - MUSE: 208 MS
PROTHROMBIN TIME: 13.5 SECONDS (ref 11.8–14.8)
QRS DURATION - MUSE: 80 MS
QT - MUSE: 448 MS
QTC - MUSE: 443 MS
R AXIS - MUSE: 78 DEGREES
RBC # BLD AUTO: 4.39 10E6/UL (ref 3.8–5.2)
RBC URINE: 2 /HPF
SODIUM SERPL-SCNC: 136 MMOL/L (ref 135–145)
SP GR UR STRIP: 1.02 (ref 1–1.03)
SPECIMEN EXP DATE BLD: NORMAL
SYSTOLIC BLOOD PRESSURE - MUSE: NORMAL MMHG
T AXIS - MUSE: 36 DEGREES
UROBILINOGEN UR STRIP-MCNC: NORMAL MG/DL
VENTRICULAR RATE- MUSE: 59 BPM
VIT D+METAB SERPL-MCNC: 68 NG/ML (ref 20–50)
WBC # BLD AUTO: 7.9 10E3/UL (ref 4–11)
WBC URINE: 6 /HPF

## 2025-08-05 PROCEDURE — 36415 COLL VENOUS BLD VENIPUNCTURE: CPT | Performed by: PHYSICIAN ASSISTANT

## 2025-08-05 PROCEDURE — 258N000003 HC RX IP 258 OP 636: Performed by: INTERNAL MEDICINE

## 2025-08-05 PROCEDURE — 82962 GLUCOSE BLOOD TEST: CPT

## 2025-08-05 PROCEDURE — 250N000013 HC RX MED GY IP 250 OP 250 PS 637: Performed by: PHYSICIAN ASSISTANT

## 2025-08-05 PROCEDURE — 250N000012 HC RX MED GY IP 250 OP 636 PS 637: Performed by: INTERNAL MEDICINE

## 2025-08-05 PROCEDURE — 93306 TTE W/DOPPLER COMPLETE: CPT

## 2025-08-05 PROCEDURE — 81001 URINALYSIS AUTO W/SCOPE: CPT | Performed by: INTERNAL MEDICINE

## 2025-08-05 PROCEDURE — 86901 BLOOD TYPING SEROLOGIC RH(D): CPT | Performed by: PHYSICIAN ASSISTANT

## 2025-08-05 PROCEDURE — 250N000011 HC RX IP 250 OP 636: Performed by: INTERNAL MEDICINE

## 2025-08-05 PROCEDURE — 120N000001 HC R&B MED SURG/OB

## 2025-08-05 PROCEDURE — 82306 VITAMIN D 25 HYDROXY: CPT | Performed by: PHYSICIAN ASSISTANT

## 2025-08-05 PROCEDURE — 84132 ASSAY OF SERUM POTASSIUM: CPT | Performed by: INTERNAL MEDICINE

## 2025-08-05 PROCEDURE — 250N000013 HC RX MED GY IP 250 OP 250 PS 637: Performed by: INTERNAL MEDICINE

## 2025-08-05 PROCEDURE — 85041 AUTOMATED RBC COUNT: CPT | Performed by: INTERNAL MEDICINE

## 2025-08-05 PROCEDURE — 250N000011 HC RX IP 250 OP 636: Performed by: STUDENT IN AN ORGANIZED HEALTH CARE EDUCATION/TRAINING PROGRAM

## 2025-08-05 PROCEDURE — 93306 TTE W/DOPPLER COMPLETE: CPT | Mod: 26 | Performed by: INTERNAL MEDICINE

## 2025-08-05 PROCEDURE — 99233 SBSQ HOSP IP/OBS HIGH 50: CPT | Performed by: INTERNAL MEDICINE

## 2025-08-05 PROCEDURE — 87186 SC STD MICRODIL/AGAR DIL: CPT | Performed by: INTERNAL MEDICINE

## 2025-08-05 PROCEDURE — 83735 ASSAY OF MAGNESIUM: CPT | Performed by: INTERNAL MEDICINE

## 2025-08-05 PROCEDURE — 85610 PROTHROMBIN TIME: CPT | Performed by: INTERNAL MEDICINE

## 2025-08-05 PROCEDURE — 36415 COLL VENOUS BLD VENIPUNCTURE: CPT | Performed by: INTERNAL MEDICINE

## 2025-08-05 RX ORDER — CLINDAMYCIN PHOSPHATE 900 MG/50ML
900 INJECTION, SOLUTION INTRAVENOUS SEE ADMIN INSTRUCTIONS
Status: DISCONTINUED | OUTPATIENT
Start: 2025-08-05 | End: 2025-08-05 | Stop reason: ALTCHOICE

## 2025-08-05 RX ORDER — CEFAZOLIN SODIUM 2 G/50ML
2 SOLUTION INTRAVENOUS SEE ADMIN INSTRUCTIONS
Status: DISCONTINUED | OUTPATIENT
Start: 2025-08-05 | End: 2025-08-06 | Stop reason: HOSPADM

## 2025-08-05 RX ORDER — CLINDAMYCIN PHOSPHATE 900 MG/50ML
900 INJECTION, SOLUTION INTRAVENOUS
Status: DISCONTINUED | OUTPATIENT
Start: 2025-08-05 | End: 2025-08-05 | Stop reason: ALTCHOICE

## 2025-08-05 RX ORDER — TRANEXAMIC ACID 10 MG/ML
1 INJECTION, SOLUTION INTRAVENOUS ONCE
Status: DISCONTINUED | OUTPATIENT
Start: 2025-08-05 | End: 2025-08-05

## 2025-08-05 RX ORDER — OLANZAPINE 10 MG/2ML
5 INJECTION, POWDER, FOR SOLUTION INTRAMUSCULAR DAILY PRN
Status: DISCONTINUED | OUTPATIENT
Start: 2025-08-05 | End: 2025-08-11 | Stop reason: HOSPADM

## 2025-08-05 RX ORDER — METHOCARBAMOL 500 MG/1
500 TABLET, FILM COATED ORAL 4 TIMES DAILY PRN
Status: DISCONTINUED | OUTPATIENT
Start: 2025-08-05 | End: 2025-08-11 | Stop reason: HOSPADM

## 2025-08-05 RX ORDER — NALOXONE HYDROCHLORIDE 0.4 MG/ML
0.2 INJECTION, SOLUTION INTRAMUSCULAR; INTRAVENOUS; SUBCUTANEOUS
Status: DISCONTINUED | OUTPATIENT
Start: 2025-08-05 | End: 2025-08-11 | Stop reason: HOSPADM

## 2025-08-05 RX ORDER — CEFAZOLIN SODIUM 2 G/50ML
2 SOLUTION INTRAVENOUS
Status: COMPLETED | OUTPATIENT
Start: 2025-08-05 | End: 2025-08-06

## 2025-08-05 RX ORDER — HYDROXYZINE HYDROCHLORIDE 10 MG/1
10 TABLET, FILM COATED ORAL EVERY 6 HOURS PRN
Status: DISCONTINUED | OUTPATIENT
Start: 2025-08-05 | End: 2025-08-10

## 2025-08-05 RX ORDER — TRANEXAMIC ACID 10 MG/ML
1 INJECTION, SOLUTION INTRAVENOUS ONCE
Status: COMPLETED | OUTPATIENT
Start: 2025-08-05 | End: 2025-08-06

## 2025-08-05 RX ORDER — NALOXONE HYDROCHLORIDE 0.4 MG/ML
0.4 INJECTION, SOLUTION INTRAMUSCULAR; INTRAVENOUS; SUBCUTANEOUS
Status: DISCONTINUED | OUTPATIENT
Start: 2025-08-05 | End: 2025-08-11 | Stop reason: HOSPADM

## 2025-08-05 RX ADMIN — OLANZAPINE 5 MG: 10 INJECTION, POWDER, FOR SOLUTION INTRAMUSCULAR at 19:20

## 2025-08-05 RX ADMIN — HYDROMORPHONE HYDROCHLORIDE 2 MG: 2 TABLET ORAL at 17:44

## 2025-08-05 RX ADMIN — HYDROMORPHONE HYDROCHLORIDE 0.4 MG: 0.2 INJECTION, SOLUTION INTRAMUSCULAR; INTRAVENOUS; SUBCUTANEOUS at 03:08

## 2025-08-05 RX ADMIN — PREGABALIN 150 MG: 75 CAPSULE ORAL at 08:47

## 2025-08-05 RX ADMIN — METHOCARBAMOL 500 MG: 500 TABLET ORAL at 17:44

## 2025-08-05 RX ADMIN — NIFEDIPINE 30 MG: 30 TABLET, FILM COATED, EXTENDED RELEASE ORAL at 00:22

## 2025-08-05 RX ADMIN — SODIUM CHLORIDE, SODIUM LACTATE, POTASSIUM CHLORIDE, AND CALCIUM CHLORIDE: .6; .31; .03; .02 INJECTION, SOLUTION INTRAVENOUS at 14:53

## 2025-08-05 RX ADMIN — NIFEDIPINE 30 MG: 30 TABLET, FILM COATED, EXTENDED RELEASE ORAL at 21:19

## 2025-08-05 RX ADMIN — FERROUS SULFATE TAB 325 MG (65 MG ELEMENTAL FE) 325 MG: 325 (65 FE) TAB at 08:47

## 2025-08-05 RX ADMIN — CITALOPRAM HYDROBROMIDE 20 MG: 20 TABLET ORAL at 08:47

## 2025-08-05 RX ADMIN — ACETAMINOPHEN 975 MG: 325 TABLET ORAL at 08:47

## 2025-08-05 RX ADMIN — PREGABALIN 150 MG: 75 CAPSULE ORAL at 19:24

## 2025-08-05 RX ADMIN — ACETAMINOPHEN 975 MG: 325 TABLET ORAL at 14:58

## 2025-08-05 RX ADMIN — HYDROMORPHONE HYDROCHLORIDE 0.2 MG: 0.2 INJECTION, SOLUTION INTRAMUSCULAR; INTRAVENOUS; SUBCUTANEOUS at 08:43

## 2025-08-05 RX ADMIN — INSULIN ASPART 1 UNITS: 100 INJECTION, SOLUTION INTRAVENOUS; SUBCUTANEOUS at 21:25

## 2025-08-05 RX ADMIN — SODIUM CHLORIDE, SODIUM LACTATE, POTASSIUM CHLORIDE, AND CALCIUM CHLORIDE: .6; .31; .03; .02 INJECTION, SOLUTION INTRAVENOUS at 03:05

## 2025-08-05 RX ADMIN — MAGNESIUM SULFATE HEPTAHYDRATE 2 G: 40 INJECTION, SOLUTION INTRAVENOUS at 00:22

## 2025-08-05 RX ADMIN — ACETAMINOPHEN 975 MG: 325 TABLET ORAL at 21:19

## 2025-08-05 RX ADMIN — HYDROXYZINE HYDROCHLORIDE 10 MG: 10 TABLET, FILM COATED ORAL at 14:58

## 2025-08-05 RX ADMIN — SIMVASTATIN 20 MG: 20 TABLET, FILM COATED ORAL at 21:19

## 2025-08-05 ASSESSMENT — ACTIVITIES OF DAILY LIVING (ADL)
ADLS_ACUITY_SCORE: 55
ADLS_ACUITY_SCORE: 54
ADLS_ACUITY_SCORE: 59
ADLS_ACUITY_SCORE: 55
ADLS_ACUITY_SCORE: 55
ADLS_ACUITY_SCORE: 54
ADLS_ACUITY_SCORE: 59
ADLS_ACUITY_SCORE: 67
ADLS_ACUITY_SCORE: 55
ADLS_ACUITY_SCORE: 59
ADLS_ACUITY_SCORE: 54
ADLS_ACUITY_SCORE: 54
ADLS_ACUITY_SCORE: 55
ADLS_ACUITY_SCORE: 59
ADLS_ACUITY_SCORE: 59

## 2025-08-06 ENCOUNTER — ANESTHESIA (OUTPATIENT)
Dept: SURGERY | Facility: CLINIC | Age: OVER 89
End: 2025-08-06
Payer: COMMERCIAL

## 2025-08-06 ENCOUNTER — ANESTHESIA EVENT (OUTPATIENT)
Dept: SURGERY | Facility: CLINIC | Age: OVER 89
End: 2025-08-06
Payer: COMMERCIAL

## 2025-08-06 LAB
ANION GAP SERPL CALCULATED.3IONS-SCNC: 12 MMOL/L (ref 7–15)
BUN SERPL-MCNC: 10.9 MG/DL (ref 8–23)
CALCIUM SERPL-MCNC: 8.9 MG/DL (ref 8.8–10.4)
CHLORIDE SERPL-SCNC: 103 MMOL/L (ref 98–107)
CREAT SERPL-MCNC: 0.59 MG/DL (ref 0.51–0.95)
EGFRCR SERPLBLD CKD-EPI 2021: 86 ML/MIN/1.73M2
ERYTHROCYTE [DISTWIDTH] IN BLOOD BY AUTOMATED COUNT: 13.4 % (ref 10–15)
GLUCOSE BLDC GLUCOMTR-MCNC: 129 MG/DL (ref 70–99)
GLUCOSE BLDC GLUCOMTR-MCNC: 138 MG/DL (ref 70–99)
GLUCOSE BLDC GLUCOMTR-MCNC: 169 MG/DL (ref 70–99)
GLUCOSE BLDC GLUCOMTR-MCNC: 175 MG/DL (ref 70–99)
GLUCOSE BLDC GLUCOMTR-MCNC: 179 MG/DL (ref 70–99)
GLUCOSE SERPL-MCNC: 137 MG/DL (ref 70–99)
HCO3 SERPL-SCNC: 24 MMOL/L (ref 22–29)
HCT VFR BLD AUTO: 37.9 % (ref 35–47)
HGB BLD-MCNC: 13.1 G/DL (ref 11.7–15.7)
MAGNESIUM SERPL-MCNC: 1.5 MG/DL (ref 1.7–2.3)
MAGNESIUM SERPL-MCNC: 2.9 MG/DL (ref 1.7–2.3)
MCH RBC QN AUTO: 31.1 PG (ref 26.5–33)
MCHC RBC AUTO-ENTMCNC: 34.6 G/DL (ref 31.5–36.5)
MCV RBC AUTO: 90 FL (ref 78–100)
PATH REPORT.COMMENTS IMP SPEC: NORMAL
PATH REPORT.COMMENTS IMP SPEC: NORMAL
PATH REPORT.FINAL DX SPEC: NORMAL
PATH REPORT.GROSS SPEC: NORMAL
PATH REPORT.MICROSCOPIC SPEC OTHER STN: NORMAL
PATH REPORT.RELEVANT HX SPEC: NORMAL
PHOTO IMAGE: NORMAL
PLATELET # BLD AUTO: 168 10E3/UL (ref 150–450)
POTASSIUM SERPL-SCNC: 4 MMOL/L (ref 3.4–5.3)
RBC # BLD AUTO: 4.21 10E6/UL (ref 3.8–5.2)
SODIUM SERPL-SCNC: 139 MMOL/L (ref 135–145)
WBC # BLD AUTO: 8.6 10E3/UL (ref 4–11)

## 2025-08-06 PROCEDURE — 250N000011 HC RX IP 250 OP 636: Performed by: NURSE ANESTHETIST, CERTIFIED REGISTERED

## 2025-08-06 PROCEDURE — 250N000025 HC SEVOFLURANE, PER MIN: Performed by: ORTHOPAEDIC SURGERY

## 2025-08-06 PROCEDURE — 710N000009 HC RECOVERY PHASE 1, LEVEL 1, PER MIN: Performed by: ORTHOPAEDIC SURGERY

## 2025-08-06 PROCEDURE — 250N000011 HC RX IP 250 OP 636: Performed by: ORTHOPAEDIC SURGERY

## 2025-08-06 PROCEDURE — 258N000003 HC RX IP 258 OP 636: Performed by: NURSE ANESTHETIST, CERTIFIED REGISTERED

## 2025-08-06 PROCEDURE — 250N000011 HC RX IP 250 OP 636: Performed by: PHYSICIAN ASSISTANT

## 2025-08-06 PROCEDURE — 85048 AUTOMATED LEUKOCYTE COUNT: CPT | Performed by: INTERNAL MEDICINE

## 2025-08-06 PROCEDURE — 120N000001 HC R&B MED SURG/OB

## 2025-08-06 PROCEDURE — 36415 COLL VENOUS BLD VENIPUNCTURE: CPT | Performed by: INTERNAL MEDICINE

## 2025-08-06 PROCEDURE — 83735 ASSAY OF MAGNESIUM: CPT | Performed by: INTERNAL MEDICINE

## 2025-08-06 PROCEDURE — 88300 SURGICAL PATH GROSS: CPT | Mod: TC | Performed by: ORTHOPAEDIC SURGERY

## 2025-08-06 PROCEDURE — 250N000013 HC RX MED GY IP 250 OP 250 PS 637: Performed by: INTERNAL MEDICINE

## 2025-08-06 PROCEDURE — 272N000001 HC OR GENERAL SUPPLY STERILE: Performed by: ORTHOPAEDIC SURGERY

## 2025-08-06 PROCEDURE — 80048 BASIC METABOLIC PNL TOTAL CA: CPT | Performed by: INTERNAL MEDICINE

## 2025-08-06 PROCEDURE — 250N000011 HC RX IP 250 OP 636: Performed by: INTERNAL MEDICINE

## 2025-08-06 PROCEDURE — 0SRR019 REPLACEMENT OF RIGHT HIP JOINT, FEMORAL SURFACE WITH METAL SYNTHETIC SUBSTITUTE, CEMENTED, OPEN APPROACH: ICD-10-PCS | Performed by: ORTHOPAEDIC SURGERY

## 2025-08-06 PROCEDURE — C1713 ANCHOR/SCREW BN/BN,TIS/BN: HCPCS | Performed by: ORTHOPAEDIC SURGERY

## 2025-08-06 PROCEDURE — 250N000009 HC RX 250: Performed by: NURSE ANESTHETIST, CERTIFIED REGISTERED

## 2025-08-06 PROCEDURE — 258N000003 HC RX IP 258 OP 636: Performed by: ORTHOPAEDIC SURGERY

## 2025-08-06 PROCEDURE — 370N000017 HC ANESTHESIA TECHNICAL FEE, PER MIN: Performed by: ORTHOPAEDIC SURGERY

## 2025-08-06 PROCEDURE — 250N000009 HC RX 250: Performed by: PHYSICIAN ASSISTANT

## 2025-08-06 PROCEDURE — 258N000001 HC RX 258: Performed by: ORTHOPAEDIC SURGERY

## 2025-08-06 PROCEDURE — C1776 JOINT DEVICE (IMPLANTABLE): HCPCS | Performed by: ORTHOPAEDIC SURGERY

## 2025-08-06 PROCEDURE — 999N000141 HC STATISTIC PRE-PROCEDURE NURSING ASSESSMENT: Performed by: ORTHOPAEDIC SURGERY

## 2025-08-06 PROCEDURE — 250N000009 HC RX 250: Performed by: ORTHOPAEDIC SURGERY

## 2025-08-06 PROCEDURE — 88300 SURGICAL PATH GROSS: CPT | Mod: 26 | Performed by: PATHOLOGY

## 2025-08-06 PROCEDURE — 99232 SBSQ HOSP IP/OBS MODERATE 35: CPT | Performed by: INTERNAL MEDICINE

## 2025-08-06 PROCEDURE — 360N000077 HC SURGERY LEVEL 4, PER MIN: Performed by: ORTHOPAEDIC SURGERY

## 2025-08-06 PROCEDURE — 250N000011 HC RX IP 250 OP 636: Performed by: STUDENT IN AN ORGANIZED HEALTH CARE EDUCATION/TRAINING PROGRAM

## 2025-08-06 PROCEDURE — 250N000012 HC RX MED GY IP 250 OP 636 PS 637: Performed by: INTERNAL MEDICINE

## 2025-08-06 DEVICE — IMPLANTABLE DEVICE: Type: IMPLANTABLE DEVICE | Site: HIP | Status: FUNCTIONAL

## 2025-08-06 DEVICE — BONE CEMENT SIMPLEX FULL DOSE 6191-1-001: Type: IMPLANTABLE DEVICE | Site: HIP | Status: FUNCTIONAL

## 2025-08-06 DEVICE — BONE CEMENT RESTRICTOR BUCK FEMORAL 18.5MM 129418: Type: IMPLANTABLE DEVICE | Site: HIP | Status: FUNCTIONAL

## 2025-08-06 DEVICE — IMP STEM CENTRALIZER DEPUY 8.5MM 1376-46-000: Type: IMPLANTABLE DEVICE | Site: HIP | Status: FUNCTIONAL

## 2025-08-06 DEVICE — IMP HEAD FEMORAL DEPUY 28MM +1.5MM 1365-11-000: Type: IMPLANTABLE DEVICE | Site: HIP | Status: FUNCTIONAL

## 2025-08-06 RX ORDER — ONDANSETRON 4 MG/1
4 TABLET, ORALLY DISINTEGRATING ORAL EVERY 30 MIN PRN
Status: DISCONTINUED | OUTPATIENT
Start: 2025-08-06 | End: 2025-08-06 | Stop reason: HOSPADM

## 2025-08-06 RX ORDER — LIDOCAINE 40 MG/G
CREAM TOPICAL
Status: DISCONTINUED | OUTPATIENT
Start: 2025-08-06 | End: 2025-08-11 | Stop reason: HOSPADM

## 2025-08-06 RX ORDER — DIPHENHYDRAMINE HCL 12.5MG/5ML
12.5 LIQUID (ML) ORAL EVERY 6 HOURS PRN
Status: DISCONTINUED | OUTPATIENT
Start: 2025-08-06 | End: 2025-08-06 | Stop reason: HOSPADM

## 2025-08-06 RX ORDER — HYDROMORPHONE HCL IN WATER/PF 6 MG/30 ML
0.2 PATIENT CONTROLLED ANALGESIA SYRINGE INTRAVENOUS
Status: DISCONTINUED | OUTPATIENT
Start: 2025-08-06 | End: 2025-08-10

## 2025-08-06 RX ORDER — AMOXICILLIN 250 MG
1 CAPSULE ORAL 2 TIMES DAILY
Status: DISCONTINUED | OUTPATIENT
Start: 2025-08-06 | End: 2025-08-11 | Stop reason: HOSPADM

## 2025-08-06 RX ORDER — PROPOFOL 10 MG/ML
INJECTION, EMULSION INTRAVENOUS PRN
Status: DISCONTINUED | OUTPATIENT
Start: 2025-08-06 | End: 2025-08-06

## 2025-08-06 RX ORDER — ALBUTEROL SULFATE 0.83 MG/ML
2.5 SOLUTION RESPIRATORY (INHALATION) EVERY 4 HOURS PRN
Status: DISCONTINUED | OUTPATIENT
Start: 2025-08-06 | End: 2025-08-06 | Stop reason: HOSPADM

## 2025-08-06 RX ORDER — NICOTINE POLACRILEX 4 MG
15-30 LOZENGE BUCCAL
Status: DISCONTINUED | OUTPATIENT
Start: 2025-08-06 | End: 2025-08-11 | Stop reason: HOSPADM

## 2025-08-06 RX ORDER — SODIUM CHLORIDE, SODIUM LACTATE, POTASSIUM CHLORIDE, CALCIUM CHLORIDE 600; 310; 30; 20 MG/100ML; MG/100ML; MG/100ML; MG/100ML
INJECTION, SOLUTION INTRAVENOUS CONTINUOUS PRN
Status: DISCONTINUED | OUTPATIENT
Start: 2025-08-06 | End: 2025-08-06

## 2025-08-06 RX ORDER — ONDANSETRON 2 MG/ML
4 INJECTION INTRAMUSCULAR; INTRAVENOUS EVERY 30 MIN PRN
Status: DISCONTINUED | OUTPATIENT
Start: 2025-08-06 | End: 2025-08-06 | Stop reason: HOSPADM

## 2025-08-06 RX ORDER — NALOXONE HYDROCHLORIDE 0.4 MG/ML
0.1 INJECTION, SOLUTION INTRAMUSCULAR; INTRAVENOUS; SUBCUTANEOUS
Status: DISCONTINUED | OUTPATIENT
Start: 2025-08-06 | End: 2025-08-06 | Stop reason: HOSPADM

## 2025-08-06 RX ORDER — SENNOSIDES 8.6 MG
325 CAPSULE ORAL DAILY
Status: DISCONTINUED | OUTPATIENT
Start: 2025-08-06 | End: 2025-08-11 | Stop reason: HOSPADM

## 2025-08-06 RX ORDER — DEXAMETHASONE SODIUM PHOSPHATE 4 MG/ML
4 INJECTION, SOLUTION INTRA-ARTICULAR; INTRALESIONAL; INTRAMUSCULAR; INTRAVENOUS; SOFT TISSUE
Status: DISCONTINUED | OUTPATIENT
Start: 2025-08-06 | End: 2025-08-06 | Stop reason: HOSPADM

## 2025-08-06 RX ORDER — HYDRALAZINE HYDROCHLORIDE 20 MG/ML
10 INJECTION INTRAMUSCULAR; INTRAVENOUS EVERY 10 MIN PRN
Status: DISCONTINUED | OUTPATIENT
Start: 2025-08-06 | End: 2025-08-06 | Stop reason: HOSPADM

## 2025-08-06 RX ORDER — DIPHENHYDRAMINE HYDROCHLORIDE 50 MG/ML
12.5 INJECTION, SOLUTION INTRAMUSCULAR; INTRAVENOUS EVERY 6 HOURS PRN
Status: DISCONTINUED | OUTPATIENT
Start: 2025-08-06 | End: 2025-08-06 | Stop reason: HOSPADM

## 2025-08-06 RX ORDER — MAGNESIUM SULFATE HEPTAHYDRATE 40 MG/ML
2 INJECTION, SOLUTION INTRAVENOUS
Status: DISCONTINUED | OUTPATIENT
Start: 2025-08-06 | End: 2025-08-06 | Stop reason: HOSPADM

## 2025-08-06 RX ORDER — CEFAZOLIN SODIUM 1 G/3ML
1 INJECTION, POWDER, FOR SOLUTION INTRAMUSCULAR; INTRAVENOUS EVERY 8 HOURS
Status: COMPLETED | OUTPATIENT
Start: 2025-08-06 | End: 2025-08-07

## 2025-08-06 RX ORDER — LABETALOL HYDROCHLORIDE 5 MG/ML
10 INJECTION, SOLUTION INTRAVENOUS
Status: DISCONTINUED | OUTPATIENT
Start: 2025-08-06 | End: 2025-08-06 | Stop reason: HOSPADM

## 2025-08-06 RX ORDER — HYDROMORPHONE HYDROCHLORIDE 2 MG/1
2 TABLET ORAL EVERY 4 HOURS PRN
Status: DISCONTINUED | OUTPATIENT
Start: 2025-08-06 | End: 2025-08-11 | Stop reason: HOSPADM

## 2025-08-06 RX ORDER — GLYCOPYRROLATE 0.2 MG/ML
INJECTION, SOLUTION INTRAMUSCULAR; INTRAVENOUS PRN
Status: DISCONTINUED | OUTPATIENT
Start: 2025-08-06 | End: 2025-08-06

## 2025-08-06 RX ORDER — HYDROMORPHONE HCL IN WATER/PF 6 MG/30 ML
0.4 PATIENT CONTROLLED ANALGESIA SYRINGE INTRAVENOUS
Status: DISCONTINUED | OUTPATIENT
Start: 2025-08-06 | End: 2025-08-10

## 2025-08-06 RX ORDER — POLYETHYLENE GLYCOL 3350 17 G/17G
17 POWDER, FOR SOLUTION ORAL DAILY
Status: DISCONTINUED | OUTPATIENT
Start: 2025-08-07 | End: 2025-08-11 | Stop reason: HOSPADM

## 2025-08-06 RX ORDER — HYDROXYZINE HYDROCHLORIDE 10 MG/1
10 TABLET, FILM COATED ORAL EVERY 6 HOURS PRN
Status: DISCONTINUED | OUTPATIENT
Start: 2025-08-06 | End: 2025-08-07

## 2025-08-06 RX ORDER — DEXTROSE MONOHYDRATE 25 G/50ML
25-50 INJECTION, SOLUTION INTRAVENOUS
Status: DISCONTINUED | OUTPATIENT
Start: 2025-08-06 | End: 2025-08-11 | Stop reason: HOSPADM

## 2025-08-06 RX ORDER — MAGNESIUM SULFATE HEPTAHYDRATE 40 MG/ML
4 INJECTION, SOLUTION INTRAVENOUS ONCE
Status: COMPLETED | OUTPATIENT
Start: 2025-08-06 | End: 2025-08-06

## 2025-08-06 RX ORDER — BISACODYL 10 MG
10 SUPPOSITORY, RECTAL RECTAL DAILY PRN
Status: DISCONTINUED | OUTPATIENT
Start: 2025-08-09 | End: 2025-08-11 | Stop reason: HOSPADM

## 2025-08-06 RX ORDER — KETOROLAC TROMETHAMINE 15 MG/ML
15 INJECTION, SOLUTION INTRAMUSCULAR; INTRAVENOUS
Status: DISCONTINUED | OUTPATIENT
Start: 2025-08-06 | End: 2025-08-06 | Stop reason: HOSPADM

## 2025-08-06 RX ORDER — FENTANYL CITRATE 50 UG/ML
INJECTION, SOLUTION INTRAMUSCULAR; INTRAVENOUS PRN
Status: DISCONTINUED | OUTPATIENT
Start: 2025-08-06 | End: 2025-08-06

## 2025-08-06 RX ORDER — METHADONE HYDROCHLORIDE 10 MG/ML
2 INJECTION, SOLUTION INTRAMUSCULAR; INTRAVENOUS; SUBCUTANEOUS 3 TIMES DAILY PRN
Status: DISCONTINUED | OUTPATIENT
Start: 2025-08-06 | End: 2025-08-06 | Stop reason: HOSPADM

## 2025-08-06 RX ORDER — DEXAMETHASONE SODIUM PHOSPHATE 4 MG/ML
INJECTION, SOLUTION INTRA-ARTICULAR; INTRALESIONAL; INTRAMUSCULAR; INTRAVENOUS; SOFT TISSUE PRN
Status: DISCONTINUED | OUTPATIENT
Start: 2025-08-06 | End: 2025-08-06

## 2025-08-06 RX ORDER — LIDOCAINE HYDROCHLORIDE 20 MG/ML
INJECTION, SOLUTION INFILTRATION; PERINEURAL PRN
Status: DISCONTINUED | OUTPATIENT
Start: 2025-08-06 | End: 2025-08-06

## 2025-08-06 RX ORDER — METHADONE HYDROCHLORIDE 10 MG/ML
INJECTION, SOLUTION INTRAMUSCULAR; INTRAVENOUS; SUBCUTANEOUS PRN
Status: DISCONTINUED | OUTPATIENT
Start: 2025-08-06 | End: 2025-08-06

## 2025-08-06 RX ORDER — ONDANSETRON 2 MG/ML
INJECTION INTRAMUSCULAR; INTRAVENOUS PRN
Status: DISCONTINUED | OUTPATIENT
Start: 2025-08-06 | End: 2025-08-06

## 2025-08-06 RX ORDER — SODIUM CHLORIDE, SODIUM LACTATE, POTASSIUM CHLORIDE, CALCIUM CHLORIDE 600; 310; 30; 20 MG/100ML; MG/100ML; MG/100ML; MG/100ML
INJECTION, SOLUTION INTRAVENOUS CONTINUOUS
Status: DISCONTINUED | OUTPATIENT
Start: 2025-08-06 | End: 2025-08-07

## 2025-08-06 RX ORDER — SODIUM CHLORIDE, SODIUM LACTATE, POTASSIUM CHLORIDE, CALCIUM CHLORIDE 600; 310; 30; 20 MG/100ML; MG/100ML; MG/100ML; MG/100ML
INJECTION, SOLUTION INTRAVENOUS CONTINUOUS
Status: DISCONTINUED | OUTPATIENT
Start: 2025-08-06 | End: 2025-08-06 | Stop reason: HOSPADM

## 2025-08-06 RX ADMIN — PHENYLEPHRINE HYDROCHLORIDE 150 MCG: 10 INJECTION INTRAVENOUS at 08:54

## 2025-08-06 RX ADMIN — DEXAMETHASONE SODIUM PHOSPHATE 4 MG: 4 INJECTION, SOLUTION INTRA-ARTICULAR; INTRALESIONAL; INTRAMUSCULAR; INTRAVENOUS; SOFT TISSUE at 07:51

## 2025-08-06 RX ADMIN — CEFAZOLIN 1 G: 1 INJECTION, POWDER, FOR SOLUTION INTRAMUSCULAR; INTRAVENOUS at 16:55

## 2025-08-06 RX ADMIN — PHENYLEPHRINE HYDROCHLORIDE 100 MCG: 10 INJECTION INTRAVENOUS at 09:34

## 2025-08-06 RX ADMIN — ROCURONIUM BROMIDE 50 MG: 50 INJECTION, SOLUTION INTRAVENOUS at 07:51

## 2025-08-06 RX ADMIN — SUGAMMADEX 200 MG: 100 INJECTION, SOLUTION INTRAVENOUS at 09:18

## 2025-08-06 RX ADMIN — OLANZAPINE 5 MG: 10 INJECTION, POWDER, FOR SOLUTION INTRAMUSCULAR at 23:42

## 2025-08-06 RX ADMIN — PHENYLEPHRINE HYDROCHLORIDE 100 MCG: 10 INJECTION INTRAVENOUS at 08:02

## 2025-08-06 RX ADMIN — PROPOFOL 150 MG: 10 INJECTION, EMULSION INTRAVENOUS at 07:51

## 2025-08-06 RX ADMIN — PHENYLEPHRINE HYDROCHLORIDE 100 MCG: 10 INJECTION INTRAVENOUS at 07:57

## 2025-08-06 RX ADMIN — CEFAZOLIN SODIUM 0.5 G: 2 SOLUTION INTRAVENOUS at 07:49

## 2025-08-06 RX ADMIN — CEFAZOLIN SODIUM 1.5 G: 2 SOLUTION INTRAVENOUS at 07:54

## 2025-08-06 RX ADMIN — TRANEXAMIC ACID 1 G: 10 INJECTION, SOLUTION INTRAVENOUS at 09:18

## 2025-08-06 RX ADMIN — PHENYLEPHRINE HYDROCHLORIDE 100 MCG: 10 INJECTION INTRAVENOUS at 09:40

## 2025-08-06 RX ADMIN — INSULIN ASPART 1 UNITS: 100 INJECTION, SOLUTION INTRAVENOUS; SUBCUTANEOUS at 18:48

## 2025-08-06 RX ADMIN — PHENYLEPHRINE HYDROCHLORIDE 100 MCG: 10 INJECTION INTRAVENOUS at 08:50

## 2025-08-06 RX ADMIN — GLYCOPYRROLATE 0.1 MG: 0.2 INJECTION, SOLUTION INTRAMUSCULAR; INTRAVENOUS at 09:35

## 2025-08-06 RX ADMIN — FENTANYL CITRATE 100 MCG: 50 INJECTION INTRAMUSCULAR; INTRAVENOUS at 08:20

## 2025-08-06 RX ADMIN — GLYCOPYRROLATE 0.1 MG: 0.2 INJECTION, SOLUTION INTRAMUSCULAR; INTRAVENOUS at 08:14

## 2025-08-06 RX ADMIN — PHENYLEPHRINE HYDROCHLORIDE 100 MCG: 10 INJECTION INTRAVENOUS at 08:07

## 2025-08-06 RX ADMIN — DEXMEDETOMIDINE HYDROCHLORIDE 12 MCG: 100 INJECTION, SOLUTION INTRAVENOUS at 08:35

## 2025-08-06 RX ADMIN — Medication 20 MG: at 10:03

## 2025-08-06 RX ADMIN — SODIUM CHLORIDE, SODIUM LACTATE, POTASSIUM CHLORIDE, AND CALCIUM CHLORIDE: .6; .31; .03; .02 INJECTION, SOLUTION INTRAVENOUS at 09:18

## 2025-08-06 RX ADMIN — LIDOCAINE HYDROCHLORIDE 50 MG: 20 INJECTION, SOLUTION INFILTRATION; PERINEURAL at 07:51

## 2025-08-06 RX ADMIN — MAGNESIUM SULFATE HEPTAHYDRATE 4 G: 40 INJECTION, SOLUTION INTRAVENOUS at 14:41

## 2025-08-06 RX ADMIN — Medication 10 MG: at 07:51

## 2025-08-06 RX ADMIN — PHENYLEPHRINE HYDROCHLORIDE 200 MCG: 10 INJECTION INTRAVENOUS at 07:51

## 2025-08-06 RX ADMIN — PHENYLEPHRINE HYDROCHLORIDE 100 MCG: 10 INJECTION INTRAVENOUS at 08:05

## 2025-08-06 RX ADMIN — DEXMEDETOMIDINE HYDROCHLORIDE 8 MCG: 100 INJECTION, SOLUTION INTRAVENOUS at 08:38

## 2025-08-06 RX ADMIN — PHENYLEPHRINE HYDROCHLORIDE 100 MCG: 10 INJECTION INTRAVENOUS at 08:11

## 2025-08-06 RX ADMIN — SODIUM CHLORIDE, SODIUM LACTATE, POTASSIUM CHLORIDE, AND CALCIUM CHLORIDE: .6; .31; .03; .02 INJECTION, SOLUTION INTRAVENOUS at 06:59

## 2025-08-06 RX ADMIN — LATANOPROST 1 DROP: 50 SOLUTION/ DROPS OPHTHALMIC at 22:01

## 2025-08-06 RX ADMIN — INSULIN ASPART 1 UNITS: 100 INJECTION, SOLUTION INTRAVENOUS; SUBCUTANEOUS at 15:27

## 2025-08-06 RX ADMIN — PHENYLEPHRINE HYDROCHLORIDE 0.3 MCG/KG/MIN: 10 INJECTION INTRAVENOUS at 08:05

## 2025-08-06 RX ADMIN — TRANEXAMIC ACID 1 G: 10 INJECTION, SOLUTION INTRAVENOUS at 07:57

## 2025-08-06 RX ADMIN — ONDANSETRON 4 MG: 2 INJECTION INTRAMUSCULAR; INTRAVENOUS at 07:51

## 2025-08-06 RX ADMIN — GLYCOPYRROLATE 0.1 MG: 0.2 INJECTION, SOLUTION INTRAMUSCULAR; INTRAVENOUS at 08:51

## 2025-08-06 ASSESSMENT — ACTIVITIES OF DAILY LIVING (ADL)
ADLS_ACUITY_SCORE: 55
ADLS_ACUITY_SCORE: 59
ADLS_ACUITY_SCORE: 55
ADLS_ACUITY_SCORE: 59
ADLS_ACUITY_SCORE: 55
ADLS_ACUITY_SCORE: 59
ADLS_ACUITY_SCORE: 55
ADLS_ACUITY_SCORE: 59
ADLS_ACUITY_SCORE: 55

## 2025-08-07 ENCOUNTER — APPOINTMENT (OUTPATIENT)
Dept: PHYSICAL THERAPY | Facility: CLINIC | Age: OVER 89
DRG: 521 | End: 2025-08-07
Attending: INTERNAL MEDICINE
Payer: COMMERCIAL

## 2025-08-07 ENCOUNTER — APPOINTMENT (OUTPATIENT)
Dept: GENERAL RADIOLOGY | Facility: CLINIC | Age: OVER 89
DRG: 521 | End: 2025-08-07
Attending: PHYSICIAN ASSISTANT
Payer: COMMERCIAL

## 2025-08-07 VITALS
WEIGHT: 161 LBS | TEMPERATURE: 97.3 F | RESPIRATION RATE: 16 BRPM | DIASTOLIC BLOOD PRESSURE: 54 MMHG | BODY MASS INDEX: 27.49 KG/M2 | HEART RATE: 65 BPM | SYSTOLIC BLOOD PRESSURE: 129 MMHG | HEIGHT: 64 IN | OXYGEN SATURATION: 97 %

## 2025-08-07 LAB
ANION GAP SERPL CALCULATED.3IONS-SCNC: 12 MMOL/L (ref 7–15)
BACTERIA UR CULT: ABNORMAL
BACTERIA UR CULT: ABNORMAL
BUN SERPL-MCNC: 16.3 MG/DL (ref 8–23)
CALCIUM SERPL-MCNC: 8.6 MG/DL (ref 8.8–10.4)
CHLORIDE SERPL-SCNC: 104 MMOL/L (ref 98–107)
CREAT SERPL-MCNC: 0.77 MG/DL (ref 0.51–0.95)
EGFRCR SERPLBLD CKD-EPI 2021: 73 ML/MIN/1.73M2
GLUCOSE BLDC GLUCOMTR-MCNC: 130 MG/DL (ref 70–99)
GLUCOSE SERPL-MCNC: 113 MG/DL (ref 70–99)
GLUCOSE SERPL-MCNC: 113 MG/DL (ref 70–99)
HCO3 SERPL-SCNC: 24 MMOL/L (ref 22–29)
HGB BLD-MCNC: 10.8 G/DL (ref 11.7–15.7)
MAGNESIUM SERPL-MCNC: 2.3 MG/DL (ref 1.7–2.3)
MCV RBC AUTO: 93 FL (ref 78–100)
NT-PROBNP SERPL-MCNC: 327 PG/ML (ref 0–624)
PHOSPHATE SERPL-MCNC: 3.5 MG/DL (ref 2.5–4.5)
POTASSIUM SERPL-SCNC: 4.4 MMOL/L (ref 3.4–5.3)
SODIUM SERPL-SCNC: 140 MMOL/L (ref 135–145)

## 2025-08-07 PROCEDURE — 250N000011 HC RX IP 250 OP 636: Performed by: STUDENT IN AN ORGANIZED HEALTH CARE EDUCATION/TRAINING PROGRAM

## 2025-08-07 PROCEDURE — 97530 THERAPEUTIC ACTIVITIES: CPT | Mod: GP

## 2025-08-07 PROCEDURE — 250N000013 HC RX MED GY IP 250 OP 250 PS 637: Performed by: PHYSICIAN ASSISTANT

## 2025-08-07 PROCEDURE — 250N000013 HC RX MED GY IP 250 OP 250 PS 637: Performed by: INTERNAL MEDICINE

## 2025-08-07 PROCEDURE — 120N000001 HC R&B MED SURG/OB

## 2025-08-07 PROCEDURE — 83735 ASSAY OF MAGNESIUM: CPT | Performed by: INTERNAL MEDICINE

## 2025-08-07 PROCEDURE — 250N000011 HC RX IP 250 OP 636: Performed by: PHYSICIAN ASSISTANT

## 2025-08-07 PROCEDURE — 250N000011 HC RX IP 250 OP 636: Performed by: INTERNAL MEDICINE

## 2025-08-07 PROCEDURE — 84100 ASSAY OF PHOSPHORUS: CPT | Performed by: INTERNAL MEDICINE

## 2025-08-07 PROCEDURE — 80048 BASIC METABOLIC PNL TOTAL CA: CPT | Performed by: INTERNAL MEDICINE

## 2025-08-07 PROCEDURE — 97162 PT EVAL MOD COMPLEX 30 MIN: CPT | Mod: GP

## 2025-08-07 PROCEDURE — 36415 COLL VENOUS BLD VENIPUNCTURE: CPT | Performed by: INTERNAL MEDICINE

## 2025-08-07 PROCEDURE — 73502 X-RAY EXAM HIP UNI 2-3 VIEWS: CPT

## 2025-08-07 PROCEDURE — 85018 HEMOGLOBIN: CPT | Performed by: PHYSICIAN ASSISTANT

## 2025-08-07 PROCEDURE — 83880 ASSAY OF NATRIURETIC PEPTIDE: CPT | Performed by: INTERNAL MEDICINE

## 2025-08-07 PROCEDURE — 99233 SBSQ HOSP IP/OBS HIGH 50: CPT | Performed by: INTERNAL MEDICINE

## 2025-08-07 RX ORDER — SENNOSIDES 8.6 MG
325 CAPSULE ORAL DAILY
DISCHARGE
Start: 2025-08-08

## 2025-08-07 RX ORDER — HYDROXYZINE HYDROCHLORIDE 10 MG/1
10 TABLET, FILM COATED ORAL EVERY 6 HOURS PRN
DISCHARGE
Start: 2025-08-07 | End: 2025-08-11

## 2025-08-07 RX ORDER — HYDROMORPHONE HYDROCHLORIDE 2 MG/1
1-2 TABLET ORAL EVERY 4 HOURS PRN
Qty: 15 TABLET | Refills: 0 | Status: SHIPPED | OUTPATIENT
Start: 2025-08-07 | End: 2025-08-11

## 2025-08-07 RX ORDER — LEVOFLOXACIN 500 MG/1
500 TABLET, FILM COATED ORAL DAILY
Status: DISCONTINUED | OUTPATIENT
Start: 2025-08-07 | End: 2025-08-08

## 2025-08-07 RX ORDER — ACETAMINOPHEN 325 MG/1
975 TABLET ORAL 3 TIMES DAILY
DISCHARGE
Start: 2025-08-07 | End: 2025-08-11

## 2025-08-07 RX ORDER — HALOPERIDOL 5 MG/ML
2 INJECTION INTRAMUSCULAR ONCE
Status: COMPLETED | OUTPATIENT
Start: 2025-08-07 | End: 2025-08-07

## 2025-08-07 RX ORDER — AMOXICILLIN 250 MG
1 CAPSULE ORAL 2 TIMES DAILY PRN
DISCHARGE
Start: 2025-08-07 | End: 2025-08-11

## 2025-08-07 RX ORDER — HALOPERIDOL 5 MG/ML
1 INJECTION INTRAMUSCULAR EVERY 6 HOURS PRN
Status: DISCONTINUED | OUTPATIENT
Start: 2025-08-07 | End: 2025-08-11 | Stop reason: HOSPADM

## 2025-08-07 RX ORDER — FUROSEMIDE 10 MG/ML
20 INJECTION INTRAMUSCULAR; INTRAVENOUS ONCE
Status: COMPLETED | OUTPATIENT
Start: 2025-08-07 | End: 2025-08-07

## 2025-08-07 RX ORDER — HALOPERIDOL 0.5 MG/1
.5-1 TABLET ORAL EVERY 6 HOURS PRN
Status: DISCONTINUED | OUTPATIENT
Start: 2025-08-07 | End: 2025-08-11 | Stop reason: HOSPADM

## 2025-08-07 RX ADMIN — CEFAZOLIN 1 G: 1 INJECTION, POWDER, FOR SOLUTION INTRAMUSCULAR; INTRAVENOUS at 00:29

## 2025-08-07 RX ADMIN — SENNOSIDES AND DOCUSATE SODIUM 1 TABLET: 50; 8.6 TABLET ORAL at 19:10

## 2025-08-07 RX ADMIN — POLYETHYLENE GLYCOL 3350 17 G: 17 POWDER, FOR SOLUTION ORAL at 08:51

## 2025-08-07 RX ADMIN — HYDROMORPHONE HYDROCHLORIDE 0.2 MG: 0.2 INJECTION, SOLUTION INTRAMUSCULAR; INTRAVENOUS; SUBCUTANEOUS at 01:52

## 2025-08-07 RX ADMIN — CITALOPRAM HYDROBROMIDE 20 MG: 20 TABLET ORAL at 08:51

## 2025-08-07 RX ADMIN — LEVOFLOXACIN 500 MG: 500 TABLET, FILM COATED ORAL at 18:13

## 2025-08-07 RX ADMIN — HALOPERIDOL 1 MG: 0.5 TABLET ORAL at 19:53

## 2025-08-07 RX ADMIN — PREGABALIN 150 MG: 75 CAPSULE ORAL at 08:49

## 2025-08-07 RX ADMIN — PREGABALIN 150 MG: 75 CAPSULE ORAL at 19:11

## 2025-08-07 RX ADMIN — HALOPERIDOL LACTATE 2 MG: 5 INJECTION, SOLUTION INTRAMUSCULAR at 00:17

## 2025-08-07 RX ADMIN — NIFEDIPINE 30 MG: 30 TABLET, FILM COATED, EXTENDED RELEASE ORAL at 19:11

## 2025-08-07 RX ADMIN — OLANZAPINE 5 MG: 10 INJECTION, POWDER, FOR SOLUTION INTRAMUSCULAR at 06:57

## 2025-08-07 RX ADMIN — HYDROXYZINE HYDROCHLORIDE 10 MG: 10 TABLET, FILM COATED ORAL at 19:10

## 2025-08-07 RX ADMIN — ACETAMINOPHEN 975 MG: 325 TABLET ORAL at 13:12

## 2025-08-07 RX ADMIN — SIMVASTATIN 20 MG: 20 TABLET, FILM COATED ORAL at 19:11

## 2025-08-07 RX ADMIN — FUROSEMIDE 20 MG: 10 INJECTION, SOLUTION INTRAMUSCULAR; INTRAVENOUS at 13:12

## 2025-08-07 RX ADMIN — HYDROMORPHONE HYDROCHLORIDE 0.2 MG: 0.2 INJECTION, SOLUTION INTRAMUSCULAR; INTRAVENOUS; SUBCUTANEOUS at 06:25

## 2025-08-07 RX ADMIN — METHOCARBAMOL 500 MG: 500 TABLET ORAL at 13:12

## 2025-08-07 RX ADMIN — METHOCARBAMOL 500 MG: 500 TABLET ORAL at 19:10

## 2025-08-07 ASSESSMENT — ACTIVITIES OF DAILY LIVING (ADL)
ADLS_ACUITY_SCORE: 63
ADLS_ACUITY_SCORE: 55
ADLS_ACUITY_SCORE: 57
ADLS_ACUITY_SCORE: 55
ADLS_ACUITY_SCORE: 63
ADLS_ACUITY_SCORE: 55
ADLS_ACUITY_SCORE: 55
ADLS_ACUITY_SCORE: 57
ADLS_ACUITY_SCORE: 55
DEPENDENT_IADLS:: TRANSPORTATION;MEDICATION MANAGEMENT
ADLS_ACUITY_SCORE: 55

## 2025-08-08 LAB
ANION GAP SERPL CALCULATED.3IONS-SCNC: 13 MMOL/L (ref 7–15)
BUN SERPL-MCNC: 13.6 MG/DL (ref 8–23)
CALCIUM SERPL-MCNC: 8.8 MG/DL (ref 8.8–10.4)
CHLORIDE SERPL-SCNC: 102 MMOL/L (ref 98–107)
CREAT SERPL-MCNC: 0.69 MG/DL (ref 0.51–0.95)
EGFRCR SERPLBLD CKD-EPI 2021: 82 ML/MIN/1.73M2
ERYTHROCYTE [DISTWIDTH] IN BLOOD BY AUTOMATED COUNT: 13.3 % (ref 10–15)
GLUCOSE BLDC GLUCOMTR-MCNC: 112 MG/DL (ref 70–99)
GLUCOSE BLDC GLUCOMTR-MCNC: 124 MG/DL (ref 70–99)
GLUCOSE BLDC GLUCOMTR-MCNC: 223 MG/DL (ref 70–99)
GLUCOSE SERPL-MCNC: 143 MG/DL (ref 70–99)
HCO3 SERPL-SCNC: 24 MMOL/L (ref 22–29)
HCT VFR BLD AUTO: 33.4 % (ref 35–47)
HGB BLD-MCNC: 11.4 G/DL (ref 11.7–15.7)
MAGNESIUM SERPL-MCNC: 1.7 MG/DL (ref 1.7–2.3)
MCH RBC QN AUTO: 31.6 PG (ref 26.5–33)
MCHC RBC AUTO-ENTMCNC: 34.1 G/DL (ref 31.5–36.5)
MCV RBC AUTO: 93 FL (ref 78–100)
PLATELET # BLD AUTO: 146 10E3/UL (ref 150–450)
POTASSIUM SERPL-SCNC: 4 MMOL/L (ref 3.4–5.3)
RBC # BLD AUTO: 3.61 10E6/UL (ref 3.8–5.2)
SODIUM SERPL-SCNC: 139 MMOL/L (ref 135–145)
WBC # BLD AUTO: 8.9 10E3/UL (ref 4–11)

## 2025-08-08 PROCEDURE — 36415 COLL VENOUS BLD VENIPUNCTURE: CPT | Performed by: INTERNAL MEDICINE

## 2025-08-08 PROCEDURE — 250N000011 HC RX IP 250 OP 636: Performed by: HOSPITALIST

## 2025-08-08 PROCEDURE — 83735 ASSAY OF MAGNESIUM: CPT | Performed by: INTERNAL MEDICINE

## 2025-08-08 PROCEDURE — 250N000013 HC RX MED GY IP 250 OP 250 PS 637: Performed by: INTERNAL MEDICINE

## 2025-08-08 PROCEDURE — 99233 SBSQ HOSP IP/OBS HIGH 50: CPT | Performed by: HOSPITALIST

## 2025-08-08 PROCEDURE — 82374 ASSAY BLOOD CARBON DIOXIDE: CPT | Performed by: INTERNAL MEDICINE

## 2025-08-08 PROCEDURE — 85027 COMPLETE CBC AUTOMATED: CPT | Performed by: INTERNAL MEDICINE

## 2025-08-08 PROCEDURE — 120N000001 HC R&B MED SURG/OB

## 2025-08-08 PROCEDURE — 250N000013 HC RX MED GY IP 250 OP 250 PS 637: Performed by: PHYSICIAN ASSISTANT

## 2025-08-08 RX ORDER — CEFTRIAXONE 1 G/1
1 INJECTION, POWDER, FOR SOLUTION INTRAMUSCULAR; INTRAVENOUS EVERY 24 HOURS
Status: DISCONTINUED | OUTPATIENT
Start: 2025-08-08 | End: 2025-08-11

## 2025-08-08 RX ADMIN — SIMVASTATIN 20 MG: 20 TABLET, FILM COATED ORAL at 21:04

## 2025-08-08 RX ADMIN — CITALOPRAM HYDROBROMIDE 20 MG: 20 TABLET ORAL at 08:50

## 2025-08-08 RX ADMIN — POLYETHYLENE GLYCOL 3350 17 G: 17 POWDER, FOR SOLUTION ORAL at 08:50

## 2025-08-08 RX ADMIN — SENNOSIDES AND DOCUSATE SODIUM 1 TABLET: 50; 8.6 TABLET ORAL at 08:49

## 2025-08-08 RX ADMIN — ACETAMINOPHEN 975 MG: 325 TABLET ORAL at 08:50

## 2025-08-08 RX ADMIN — NIFEDIPINE 30 MG: 30 TABLET, FILM COATED, EXTENDED RELEASE ORAL at 21:04

## 2025-08-08 RX ADMIN — SENNOSIDES AND DOCUSATE SODIUM 1 TABLET: 50; 8.6 TABLET ORAL at 21:04

## 2025-08-08 RX ADMIN — LEVOTHYROXINE SODIUM 112 MCG: 0.11 TABLET ORAL at 08:50

## 2025-08-08 RX ADMIN — PREGABALIN 150 MG: 75 CAPSULE ORAL at 21:04

## 2025-08-08 RX ADMIN — CEFTRIAXONE 1 G: 1 INJECTION, POWDER, FOR SOLUTION INTRAMUSCULAR; INTRAVENOUS at 20:53

## 2025-08-08 RX ADMIN — PREGABALIN 150 MG: 75 CAPSULE ORAL at 08:49

## 2025-08-08 RX ADMIN — LEVOFLOXACIN 500 MG: 500 TABLET, FILM COATED ORAL at 08:49

## 2025-08-08 RX ADMIN — ASPIRIN 325 MG: 325 TABLET, COATED ORAL at 08:50

## 2025-08-08 ASSESSMENT — ACTIVITIES OF DAILY LIVING (ADL)
ADLS_ACUITY_SCORE: 61
ADLS_ACUITY_SCORE: 61
ADLS_ACUITY_SCORE: 65
ADLS_ACUITY_SCORE: 61
ADLS_ACUITY_SCORE: 60
ADLS_ACUITY_SCORE: 61
ADLS_ACUITY_SCORE: 60
ADLS_ACUITY_SCORE: 63
ADLS_ACUITY_SCORE: 63
ADLS_ACUITY_SCORE: 61
ADLS_ACUITY_SCORE: 63
ADLS_ACUITY_SCORE: 63
ADLS_ACUITY_SCORE: 61
ADLS_ACUITY_SCORE: 63
ADLS_ACUITY_SCORE: 63
ADLS_ACUITY_SCORE: 60

## 2025-08-09 ENCOUNTER — APPOINTMENT (OUTPATIENT)
Dept: PHYSICAL THERAPY | Facility: CLINIC | Age: OVER 89
DRG: 521 | End: 2025-08-09
Payer: COMMERCIAL

## 2025-08-09 LAB
GLUCOSE BLDC GLUCOMTR-MCNC: 106 MG/DL (ref 70–99)
GLUCOSE BLDC GLUCOMTR-MCNC: 124 MG/DL (ref 70–99)
GLUCOSE BLDC GLUCOMTR-MCNC: 174 MG/DL (ref 70–99)
GLUCOSE BLDC GLUCOMTR-MCNC: 225 MG/DL (ref 70–99)
GLUCOSE SERPL-MCNC: 93 MG/DL (ref 70–99)
HOLD SPECIMEN: NORMAL
MAGNESIUM SERPL-MCNC: 1.9 MG/DL (ref 1.7–2.3)

## 2025-08-09 PROCEDURE — 250N000013 HC RX MED GY IP 250 OP 250 PS 637: Performed by: PHYSICIAN ASSISTANT

## 2025-08-09 PROCEDURE — 99232 SBSQ HOSP IP/OBS MODERATE 35: CPT | Performed by: INTERNAL MEDICINE

## 2025-08-09 PROCEDURE — 97530 THERAPEUTIC ACTIVITIES: CPT | Mod: GP

## 2025-08-09 PROCEDURE — 82947 ASSAY GLUCOSE BLOOD QUANT: CPT | Performed by: INTERNAL MEDICINE

## 2025-08-09 PROCEDURE — 250N000013 HC RX MED GY IP 250 OP 250 PS 637: Performed by: INTERNAL MEDICINE

## 2025-08-09 PROCEDURE — 97110 THERAPEUTIC EXERCISES: CPT | Mod: GP

## 2025-08-09 PROCEDURE — 120N000001 HC R&B MED SURG/OB

## 2025-08-09 PROCEDURE — 36415 COLL VENOUS BLD VENIPUNCTURE: CPT | Performed by: INTERNAL MEDICINE

## 2025-08-09 PROCEDURE — 250N000011 HC RX IP 250 OP 636: Performed by: HOSPITALIST

## 2025-08-09 PROCEDURE — 83735 ASSAY OF MAGNESIUM: CPT | Performed by: INTERNAL MEDICINE

## 2025-08-09 RX ADMIN — LATANOPROST 1 DROP: 50 SOLUTION/ DROPS OPHTHALMIC at 20:53

## 2025-08-09 RX ADMIN — CEFTRIAXONE 1 G: 1 INJECTION, POWDER, FOR SOLUTION INTRAMUSCULAR; INTRAVENOUS at 20:41

## 2025-08-09 RX ADMIN — LEVOTHYROXINE SODIUM 112 MCG: 0.11 TABLET ORAL at 09:34

## 2025-08-09 RX ADMIN — POLYETHYLENE GLYCOL 3350 17 G: 17 POWDER, FOR SOLUTION ORAL at 09:34

## 2025-08-09 RX ADMIN — ACETAMINOPHEN 975 MG: 325 TABLET ORAL at 22:03

## 2025-08-09 RX ADMIN — SIMVASTATIN 20 MG: 20 TABLET, FILM COATED ORAL at 20:39

## 2025-08-09 RX ADMIN — SENNOSIDES AND DOCUSATE SODIUM 1 TABLET: 50; 8.6 TABLET ORAL at 09:34

## 2025-08-09 RX ADMIN — ACETAMINOPHEN 975 MG: 325 TABLET ORAL at 09:34

## 2025-08-09 RX ADMIN — SENNOSIDES AND DOCUSATE SODIUM 1 TABLET: 50; 8.6 TABLET ORAL at 20:39

## 2025-08-09 RX ADMIN — FERROUS SULFATE TAB 325 MG (65 MG ELEMENTAL FE) 325 MG: 325 (65 FE) TAB at 13:38

## 2025-08-09 RX ADMIN — ASPIRIN 325 MG: 325 TABLET, COATED ORAL at 09:34

## 2025-08-09 RX ADMIN — ACETAMINOPHEN 975 MG: 325 TABLET ORAL at 13:38

## 2025-08-09 RX ADMIN — CITALOPRAM HYDROBROMIDE 20 MG: 20 TABLET ORAL at 09:34

## 2025-08-09 RX ADMIN — PREGABALIN 150 MG: 75 CAPSULE ORAL at 20:39

## 2025-08-09 RX ADMIN — INSULIN ASPART 2 UNITS: 100 INJECTION, SOLUTION INTRAVENOUS; SUBCUTANEOUS at 12:25

## 2025-08-09 RX ADMIN — PREGABALIN 150 MG: 75 CAPSULE ORAL at 09:34

## 2025-08-09 RX ADMIN — NIFEDIPINE 30 MG: 30 TABLET, FILM COATED, EXTENDED RELEASE ORAL at 20:39

## 2025-08-09 ASSESSMENT — ACTIVITIES OF DAILY LIVING (ADL)
ADLS_ACUITY_SCORE: 65

## 2025-08-10 LAB
ANION GAP SERPL CALCULATED.3IONS-SCNC: 17 MMOL/L (ref 7–15)
BUN SERPL-MCNC: 17.2 MG/DL (ref 8–23)
CALCIUM SERPL-MCNC: 9.7 MG/DL (ref 8.8–10.4)
CHLORIDE SERPL-SCNC: 102 MMOL/L (ref 98–107)
CREAT SERPL-MCNC: 0.75 MG/DL (ref 0.51–0.95)
EGFRCR SERPLBLD CKD-EPI 2021: 76 ML/MIN/1.73M2
GLUCOSE BLDC GLUCOMTR-MCNC: 128 MG/DL (ref 70–99)
GLUCOSE BLDC GLUCOMTR-MCNC: 155 MG/DL (ref 70–99)
GLUCOSE BLDC GLUCOMTR-MCNC: 180 MG/DL (ref 70–99)
GLUCOSE BLDC GLUCOMTR-MCNC: 182 MG/DL (ref 70–99)
GLUCOSE BLDC GLUCOMTR-MCNC: 225 MG/DL (ref 70–99)
GLUCOSE SERPL-MCNC: 148 MG/DL (ref 70–99)
GLUCOSE SERPL-MCNC: 151 MG/DL (ref 70–99)
HCO3 SERPL-SCNC: 23 MMOL/L (ref 22–29)
MAGNESIUM SERPL-MCNC: 2 MG/DL (ref 1.7–2.3)
POTASSIUM SERPL-SCNC: 4.3 MMOL/L (ref 3.4–5.3)
SODIUM SERPL-SCNC: 142 MMOL/L (ref 135–145)

## 2025-08-10 PROCEDURE — 250N000013 HC RX MED GY IP 250 OP 250 PS 637: Performed by: INTERNAL MEDICINE

## 2025-08-10 PROCEDURE — 120N000001 HC R&B MED SURG/OB

## 2025-08-10 PROCEDURE — 250N000013 HC RX MED GY IP 250 OP 250 PS 637: Performed by: PHYSICIAN ASSISTANT

## 2025-08-10 PROCEDURE — 82947 ASSAY GLUCOSE BLOOD QUANT: CPT | Performed by: INTERNAL MEDICINE

## 2025-08-10 PROCEDURE — 82374 ASSAY BLOOD CARBON DIOXIDE: CPT | Performed by: INTERNAL MEDICINE

## 2025-08-10 PROCEDURE — 36415 COLL VENOUS BLD VENIPUNCTURE: CPT | Performed by: INTERNAL MEDICINE

## 2025-08-10 PROCEDURE — 250N000011 HC RX IP 250 OP 636: Performed by: HOSPITALIST

## 2025-08-10 PROCEDURE — 99232 SBSQ HOSP IP/OBS MODERATE 35: CPT | Performed by: INTERNAL MEDICINE

## 2025-08-10 PROCEDURE — 83735 ASSAY OF MAGNESIUM: CPT | Performed by: INTERNAL MEDICINE

## 2025-08-10 RX ORDER — PREGABALIN 100 MG/1
100 CAPSULE ORAL 2 TIMES DAILY
Status: DISCONTINUED | OUTPATIENT
Start: 2025-08-10 | End: 2025-08-10

## 2025-08-10 RX ORDER — PREGABALIN 75 MG/1
75 CAPSULE ORAL 2 TIMES DAILY
Status: DISCONTINUED | OUTPATIENT
Start: 2025-08-10 | End: 2025-08-11 | Stop reason: HOSPADM

## 2025-08-10 RX ADMIN — POLYETHYLENE GLYCOL 3350 17 G: 17 POWDER, FOR SOLUTION ORAL at 08:11

## 2025-08-10 RX ADMIN — ASPIRIN 325 MG: 325 TABLET, COATED ORAL at 08:12

## 2025-08-10 RX ADMIN — ACETAMINOPHEN 975 MG: 325 TABLET ORAL at 13:29

## 2025-08-10 RX ADMIN — ACETAMINOPHEN 975 MG: 325 TABLET ORAL at 08:12

## 2025-08-10 RX ADMIN — Medication 1 MG: at 21:32

## 2025-08-10 RX ADMIN — INSULIN ASPART 1 UNITS: 100 INJECTION, SOLUTION INTRAVENOUS; SUBCUTANEOUS at 17:06

## 2025-08-10 RX ADMIN — LATANOPROST 1 DROP: 50 SOLUTION/ DROPS OPHTHALMIC at 20:55

## 2025-08-10 RX ADMIN — SENNOSIDES AND DOCUSATE SODIUM 1 TABLET: 50; 8.6 TABLET ORAL at 20:47

## 2025-08-10 RX ADMIN — CEFTRIAXONE 1 G: 1 INJECTION, POWDER, FOR SOLUTION INTRAMUSCULAR; INTRAVENOUS at 20:50

## 2025-08-10 RX ADMIN — PREGABALIN 150 MG: 75 CAPSULE ORAL at 08:12

## 2025-08-10 RX ADMIN — INSULIN ASPART 1 UNITS: 100 INJECTION, SOLUTION INTRAVENOUS; SUBCUTANEOUS at 13:29

## 2025-08-10 RX ADMIN — SIMVASTATIN 20 MG: 20 TABLET, FILM COATED ORAL at 20:47

## 2025-08-10 RX ADMIN — FERROUS SULFATE TAB 325 MG (65 MG ELEMENTAL FE) 325 MG: 325 (65 FE) TAB at 13:29

## 2025-08-10 RX ADMIN — NIFEDIPINE 30 MG: 30 TABLET, FILM COATED, EXTENDED RELEASE ORAL at 20:47

## 2025-08-10 RX ADMIN — CITALOPRAM HYDROBROMIDE 20 MG: 20 TABLET ORAL at 08:12

## 2025-08-10 RX ADMIN — ACETAMINOPHEN 975 MG: 325 TABLET ORAL at 21:32

## 2025-08-10 RX ADMIN — SENNOSIDES AND DOCUSATE SODIUM 1 TABLET: 50; 8.6 TABLET ORAL at 08:12

## 2025-08-10 RX ADMIN — PREGABALIN 75 MG: 75 CAPSULE ORAL at 20:47

## 2025-08-10 RX ADMIN — LEVOTHYROXINE SODIUM 112 MCG: 0.11 TABLET ORAL at 08:12

## 2025-08-10 ASSESSMENT — ACTIVITIES OF DAILY LIVING (ADL)
ADLS_ACUITY_SCORE: 61
ADLS_ACUITY_SCORE: 60
ADLS_ACUITY_SCORE: 61
ADLS_ACUITY_SCORE: 61
ADLS_ACUITY_SCORE: 60
ADLS_ACUITY_SCORE: 65
ADLS_ACUITY_SCORE: 60
ADLS_ACUITY_SCORE: 65
ADLS_ACUITY_SCORE: 60
ADLS_ACUITY_SCORE: 65
ADLS_ACUITY_SCORE: 60
ADLS_ACUITY_SCORE: 60
ADLS_ACUITY_SCORE: 61
ADLS_ACUITY_SCORE: 61
ADLS_ACUITY_SCORE: 60
ADLS_ACUITY_SCORE: 65

## 2025-08-11 VITALS
SYSTOLIC BLOOD PRESSURE: 152 MMHG | OXYGEN SATURATION: 95 % | HEIGHT: 64 IN | BODY MASS INDEX: 27.49 KG/M2 | HEART RATE: 58 BPM | RESPIRATION RATE: 12 BRPM | WEIGHT: 161 LBS | DIASTOLIC BLOOD PRESSURE: 71 MMHG | TEMPERATURE: 98.2 F

## 2025-08-11 LAB
GLUCOSE BLDC GLUCOMTR-MCNC: 143 MG/DL (ref 70–99)
GLUCOSE BLDC GLUCOMTR-MCNC: 144 MG/DL (ref 70–99)
GLUCOSE BLDC GLUCOMTR-MCNC: 254 MG/DL (ref 70–99)
HOLD SPECIMEN: NORMAL
MAGNESIUM SERPL-MCNC: 1.9 MG/DL (ref 1.7–2.3)

## 2025-08-11 PROCEDURE — 250N000013 HC RX MED GY IP 250 OP 250 PS 637: Performed by: INTERNAL MEDICINE

## 2025-08-11 PROCEDURE — 97116 GAIT TRAINING THERAPY: CPT | Mod: GP | Performed by: PHYSICAL THERAPIST

## 2025-08-11 PROCEDURE — 83735 ASSAY OF MAGNESIUM: CPT | Performed by: INTERNAL MEDICINE

## 2025-08-11 PROCEDURE — 250N000013 HC RX MED GY IP 250 OP 250 PS 637: Performed by: PHYSICIAN ASSISTANT

## 2025-08-11 PROCEDURE — 97530 THERAPEUTIC ACTIVITIES: CPT | Mod: GP | Performed by: PHYSICAL THERAPIST

## 2025-08-11 PROCEDURE — 36415 COLL VENOUS BLD VENIPUNCTURE: CPT | Performed by: INTERNAL MEDICINE

## 2025-08-11 RX ORDER — LACTOBACILLUS RHAMNOSUS GG 10B CELL
1 CAPSULE ORAL 2 TIMES DAILY
DISCHARGE
Start: 2025-08-11 | End: 2025-08-16

## 2025-08-11 RX ORDER — LACTOBACILLUS RHAMNOSUS GG 10B CELL
1 CAPSULE ORAL 2 TIMES DAILY
Status: DISCONTINUED | OUTPATIENT
Start: 2025-08-11 | End: 2025-08-11 | Stop reason: HOSPADM

## 2025-08-11 RX ORDER — PREGABALIN 75 MG/1
75 CAPSULE ORAL 2 TIMES DAILY
Qty: 60 CAPSULE | Refills: 0 | Status: SHIPPED | DISCHARGE
Start: 2025-08-11

## 2025-08-11 RX ORDER — AMOXICILLIN 250 MG
1 CAPSULE ORAL 2 TIMES DAILY PRN
DISCHARGE
Start: 2025-08-11

## 2025-08-11 RX ORDER — HYDROXYZINE HYDROCHLORIDE 10 MG/1
10 TABLET, FILM COATED ORAL EVERY 6 HOURS PRN
DISCHARGE
Start: 2025-08-11

## 2025-08-11 RX ORDER — CEFPODOXIME PROXETIL 200 MG/1
200 TABLET, FILM COATED ORAL EVERY 12 HOURS
Qty: 3 TABLET | Refills: 0 | DISCHARGE
Start: 2025-08-11 | End: 2025-08-13

## 2025-08-11 RX ORDER — ACETAMINOPHEN 325 MG/1
975 TABLET ORAL 3 TIMES DAILY
DISCHARGE
Start: 2025-08-11

## 2025-08-11 RX ORDER — CEFPODOXIME PROXETIL 200 MG/1
200 TABLET, FILM COATED ORAL EVERY 12 HOURS SCHEDULED
Status: DISCONTINUED | OUTPATIENT
Start: 2025-08-11 | End: 2025-08-11 | Stop reason: HOSPADM

## 2025-08-11 RX ADMIN — FERROUS SULFATE TAB 325 MG (65 MG ELEMENTAL FE) 325 MG: 325 (65 FE) TAB at 11:36

## 2025-08-11 RX ADMIN — INSULIN ASPART 3 UNITS: 100 INJECTION, SOLUTION INTRAVENOUS; SUBCUTANEOUS at 11:36

## 2025-08-11 RX ADMIN — ACETAMINOPHEN 975 MG: 325 TABLET ORAL at 08:35

## 2025-08-11 RX ADMIN — PREGABALIN 75 MG: 75 CAPSULE ORAL at 08:35

## 2025-08-11 RX ADMIN — ASPIRIN 325 MG: 325 TABLET, COATED ORAL at 08:36

## 2025-08-11 RX ADMIN — POLYETHYLENE GLYCOL 3350 17 G: 17 POWDER, FOR SOLUTION ORAL at 08:35

## 2025-08-11 RX ADMIN — INSULIN ASPART 1 UNITS: 100 INJECTION, SOLUTION INTRAVENOUS; SUBCUTANEOUS at 08:35

## 2025-08-11 RX ADMIN — ACETAMINOPHEN 975 MG: 325 TABLET ORAL at 13:39

## 2025-08-11 RX ADMIN — Medication 1 CAPSULE: at 13:39

## 2025-08-11 RX ADMIN — CITALOPRAM HYDROBROMIDE 20 MG: 20 TABLET ORAL at 08:36

## 2025-08-11 RX ADMIN — LEVOTHYROXINE SODIUM 112 MCG: 0.11 TABLET ORAL at 06:45

## 2025-08-11 RX ADMIN — SENNOSIDES AND DOCUSATE SODIUM 1 TABLET: 50; 8.6 TABLET ORAL at 08:35

## 2025-08-11 ASSESSMENT — ACTIVITIES OF DAILY LIVING (ADL)
ADLS_ACUITY_SCORE: 60
ADLS_ACUITY_SCORE: 58
ADLS_ACUITY_SCORE: 60
ADLS_ACUITY_SCORE: 60
ADLS_ACUITY_SCORE: 58
ADLS_ACUITY_SCORE: 60
ADLS_ACUITY_SCORE: 58
ADLS_ACUITY_SCORE: 60
ADLS_ACUITY_SCORE: 58
ADLS_ACUITY_SCORE: 60
ADLS_ACUITY_SCORE: 58
ADLS_ACUITY_SCORE: 60
ADLS_ACUITY_SCORE: 60

## 2025-08-13 ENCOUNTER — PATIENT OUTREACH (OUTPATIENT)
Dept: CARE COORDINATION | Facility: CLINIC | Age: OVER 89
End: 2025-08-13
Payer: COMMERCIAL

## 2025-08-13 ENCOUNTER — TRANSITIONAL CARE UNIT VISIT (OUTPATIENT)
Dept: GERIATRICS | Facility: CLINIC | Age: OVER 89
End: 2025-08-13
Payer: COMMERCIAL

## 2025-08-13 ENCOUNTER — DOCUMENTATION ONLY (OUTPATIENT)
Dept: GERIATRICS | Facility: CLINIC | Age: OVER 89
End: 2025-08-13

## 2025-08-13 VITALS
DIASTOLIC BLOOD PRESSURE: 73 MMHG | BODY MASS INDEX: 28.34 KG/M2 | SYSTOLIC BLOOD PRESSURE: 124 MMHG | WEIGHT: 166 LBS | TEMPERATURE: 98.4 F | OXYGEN SATURATION: 93 % | HEIGHT: 64 IN | HEART RATE: 78 BPM | RESPIRATION RATE: 18 BRPM

## 2025-08-13 VITALS
HEIGHT: 64 IN | RESPIRATION RATE: 18 BRPM | TEMPERATURE: 98.4 F | BODY MASS INDEX: 28.34 KG/M2 | WEIGHT: 166 LBS | OXYGEN SATURATION: 93 % | DIASTOLIC BLOOD PRESSURE: 73 MMHG | SYSTOLIC BLOOD PRESSURE: 124 MMHG | HEART RATE: 78 BPM

## 2025-08-13 DIAGNOSIS — N39.0 URINARY TRACT INFECTION WITHOUT HEMATURIA, SITE UNSPECIFIED: ICD-10-CM

## 2025-08-13 DIAGNOSIS — R41.89 COGNITIVE IMPAIRMENT: ICD-10-CM

## 2025-08-13 DIAGNOSIS — K59.03 DRUG-INDUCED CONSTIPATION: ICD-10-CM

## 2025-08-13 DIAGNOSIS — E11.43 TYPE 2 DIABETES MELLITUS WITH AUTONOMIC NEUROPATHY, UNSPECIFIED WHETHER LONG TERM INSULIN USE (H): ICD-10-CM

## 2025-08-13 DIAGNOSIS — S72.001D TRAUMATIC FRACTURE OF RIGHT HIP WITH ROUTINE HEALING: Primary | ICD-10-CM

## 2025-08-13 DIAGNOSIS — I10 BENIGN ESSENTIAL HYPERTENSION: ICD-10-CM

## 2025-08-13 DIAGNOSIS — R53.81 PHYSICAL DECONDITIONING: ICD-10-CM

## 2025-08-13 DIAGNOSIS — D62 ANEMIA DUE TO BLOOD LOSS, ACUTE: ICD-10-CM

## 2025-08-13 ASSESSMENT — ACTIVITIES OF DAILY LIVING (ADL): DEPENDENT_IADLS:: TRANSPORTATION;MEDICATION MANAGEMENT

## 2025-08-14 ENCOUNTER — TRANSITIONAL CARE UNIT VISIT (OUTPATIENT)
Dept: GERIATRICS | Facility: CLINIC | Age: OVER 89
End: 2025-08-14
Payer: COMMERCIAL

## 2025-08-14 DIAGNOSIS — R41.89 COGNITIVE IMPAIRMENT: ICD-10-CM

## 2025-08-14 DIAGNOSIS — R53.81 PHYSICAL DECONDITIONING: ICD-10-CM

## 2025-08-14 DIAGNOSIS — N39.0 URINARY TRACT INFECTION WITHOUT HEMATURIA, SITE UNSPECIFIED: ICD-10-CM

## 2025-08-14 DIAGNOSIS — S72.001D TRAUMATIC FRACTURE OF RIGHT HIP WITH ROUTINE HEALING: Primary | ICD-10-CM

## 2025-08-14 DIAGNOSIS — D62 ANEMIA DUE TO BLOOD LOSS, ACUTE: ICD-10-CM

## 2025-08-14 DIAGNOSIS — E11.43 TYPE 2 DIABETES MELLITUS WITH AUTONOMIC NEUROPATHY, UNSPECIFIED WHETHER LONG TERM INSULIN USE (H): ICD-10-CM

## 2025-08-14 DIAGNOSIS — K59.03 DRUG-INDUCED CONSTIPATION: ICD-10-CM

## 2025-08-14 DIAGNOSIS — I10 BENIGN ESSENTIAL HYPERTENSION: ICD-10-CM

## 2025-08-18 ENCOUNTER — TRANSITIONAL CARE UNIT VISIT (OUTPATIENT)
Dept: GERIATRICS | Facility: CLINIC | Age: OVER 89
End: 2025-08-18
Payer: COMMERCIAL

## 2025-08-18 ENCOUNTER — TELEPHONE (OUTPATIENT)
Dept: GERIATRICS | Facility: CLINIC | Age: OVER 89
End: 2025-08-18

## 2025-08-18 VITALS
DIASTOLIC BLOOD PRESSURE: 82 MMHG | OXYGEN SATURATION: 93 % | RESPIRATION RATE: 16 BRPM | BODY MASS INDEX: 28.34 KG/M2 | HEART RATE: 80 BPM | SYSTOLIC BLOOD PRESSURE: 138 MMHG | WEIGHT: 166 LBS | TEMPERATURE: 98.6 F | HEIGHT: 64 IN

## 2025-08-18 DIAGNOSIS — N39.0 URINARY TRACT INFECTION WITHOUT HEMATURIA, SITE UNSPECIFIED: ICD-10-CM

## 2025-08-18 DIAGNOSIS — R53.81 PHYSICAL DECONDITIONING: ICD-10-CM

## 2025-08-18 DIAGNOSIS — D62 ANEMIA DUE TO BLOOD LOSS, ACUTE: ICD-10-CM

## 2025-08-18 DIAGNOSIS — S72.001D TRAUMATIC FRACTURE OF RIGHT HIP WITH ROUTINE HEALING: Primary | ICD-10-CM

## 2025-08-18 DIAGNOSIS — I10 BENIGN ESSENTIAL HYPERTENSION: ICD-10-CM

## 2025-08-18 DIAGNOSIS — K59.03 DRUG-INDUCED CONSTIPATION: ICD-10-CM

## 2025-08-18 DIAGNOSIS — E11.43 TYPE 2 DIABETES MELLITUS WITH AUTONOMIC NEUROPATHY, UNSPECIFIED WHETHER LONG TERM INSULIN USE (H): ICD-10-CM

## 2025-08-18 DIAGNOSIS — R41.89 COGNITIVE IMPAIRMENT: ICD-10-CM

## 2025-08-18 PROCEDURE — 99310 SBSQ NF CARE HIGH MDM 45: CPT | Performed by: NURSE PRACTITIONER

## 2025-08-21 ENCOUNTER — TRANSITIONAL CARE UNIT VISIT (OUTPATIENT)
Dept: GERIATRICS | Facility: CLINIC | Age: OVER 89
End: 2025-08-21
Payer: COMMERCIAL

## 2025-08-21 VITALS
TEMPERATURE: 97.5 F | SYSTOLIC BLOOD PRESSURE: 133 MMHG | DIASTOLIC BLOOD PRESSURE: 80 MMHG | WEIGHT: 166 LBS | OXYGEN SATURATION: 93 % | BODY MASS INDEX: 28.34 KG/M2 | HEART RATE: 62 BPM | RESPIRATION RATE: 18 BRPM | HEIGHT: 64 IN

## 2025-08-21 DIAGNOSIS — R53.81 PHYSICAL DECONDITIONING: ICD-10-CM

## 2025-08-21 DIAGNOSIS — S72.001D TRAUMATIC FRACTURE OF RIGHT HIP WITH ROUTINE HEALING: Primary | ICD-10-CM

## 2025-08-21 DIAGNOSIS — D62 ANEMIA DUE TO BLOOD LOSS, ACUTE: ICD-10-CM

## 2025-08-21 DIAGNOSIS — R41.89 COGNITIVE IMPAIRMENT: ICD-10-CM

## 2025-08-25 ENCOUNTER — DOCUMENTATION ONLY (OUTPATIENT)
Dept: GERIATRICS | Facility: CLINIC | Age: OVER 89
End: 2025-08-25
Payer: COMMERCIAL

## 2025-08-26 ENCOUNTER — TRANSITIONAL CARE UNIT VISIT (OUTPATIENT)
Dept: GERIATRICS | Facility: CLINIC | Age: OVER 89
End: 2025-08-26
Payer: COMMERCIAL

## 2025-08-26 ENCOUNTER — TELEPHONE (OUTPATIENT)
Dept: GERIATRICS | Facility: CLINIC | Age: OVER 89
End: 2025-08-26

## 2025-08-26 VITALS
OXYGEN SATURATION: 93 % | WEIGHT: 161 LBS | HEART RATE: 62 BPM | RESPIRATION RATE: 16 BRPM | TEMPERATURE: 97.8 F | BODY MASS INDEX: 27.49 KG/M2 | DIASTOLIC BLOOD PRESSURE: 82 MMHG | HEIGHT: 64 IN | SYSTOLIC BLOOD PRESSURE: 158 MMHG

## 2025-08-26 DIAGNOSIS — I10 BENIGN ESSENTIAL HYPERTENSION: ICD-10-CM

## 2025-08-26 DIAGNOSIS — K59.03 DRUG-INDUCED CONSTIPATION: ICD-10-CM

## 2025-08-26 DIAGNOSIS — D62 ANEMIA DUE TO BLOOD LOSS, ACUTE: ICD-10-CM

## 2025-08-26 DIAGNOSIS — N39.0 URINARY TRACT INFECTION WITHOUT HEMATURIA, SITE UNSPECIFIED: ICD-10-CM

## 2025-08-26 DIAGNOSIS — E11.43 TYPE 2 DIABETES MELLITUS WITH AUTONOMIC NEUROPATHY, UNSPECIFIED WHETHER LONG TERM INSULIN USE (H): ICD-10-CM

## 2025-08-26 DIAGNOSIS — S72.001D TRAUMATIC FRACTURE OF RIGHT HIP WITH ROUTINE HEALING: Primary | ICD-10-CM

## 2025-08-26 DIAGNOSIS — R41.89 COGNITIVE IMPAIRMENT: ICD-10-CM

## 2025-08-26 DIAGNOSIS — R53.81 PHYSICAL DECONDITIONING: ICD-10-CM

## 2025-08-26 PROCEDURE — 99310 SBSQ NF CARE HIGH MDM 45: CPT | Performed by: NURSE PRACTITIONER

## 2025-08-26 RX ORDER — TAMSULOSIN HYDROCHLORIDE 0.4 MG/1
0.4 CAPSULE ORAL AT BEDTIME
COMMUNITY
Start: 2025-08-26

## 2025-08-27 VITALS
SYSTOLIC BLOOD PRESSURE: 149 MMHG | TEMPERATURE: 97.2 F | WEIGHT: 161 LBS | RESPIRATION RATE: 18 BRPM | HEIGHT: 64 IN | BODY MASS INDEX: 27.49 KG/M2 | DIASTOLIC BLOOD PRESSURE: 83 MMHG | HEART RATE: 83 BPM | OXYGEN SATURATION: 94 %

## 2025-08-28 ENCOUNTER — TRANSITIONAL CARE UNIT VISIT (OUTPATIENT)
Dept: GERIATRICS | Facility: CLINIC | Age: OVER 89
End: 2025-08-28
Payer: COMMERCIAL

## 2025-08-29 VITALS
OXYGEN SATURATION: 94 % | DIASTOLIC BLOOD PRESSURE: 81 MMHG | HEIGHT: 64 IN | WEIGHT: 161.6 LBS | HEART RATE: 68 BPM | SYSTOLIC BLOOD PRESSURE: 135 MMHG | RESPIRATION RATE: 18 BRPM | BODY MASS INDEX: 27.59 KG/M2 | TEMPERATURE: 97 F

## 2025-08-30 ENCOUNTER — TRANSITIONAL CARE UNIT VISIT (OUTPATIENT)
Dept: GERIATRICS | Facility: CLINIC | Age: OVER 89
End: 2025-08-30
Payer: COMMERCIAL

## 2025-08-30 DIAGNOSIS — E11.43 TYPE 2 DIABETES MELLITUS WITH AUTONOMIC NEUROPATHY, UNSPECIFIED WHETHER LONG TERM INSULIN USE (H): ICD-10-CM

## 2025-08-30 DIAGNOSIS — D62 ANEMIA DUE TO BLOOD LOSS, ACUTE: ICD-10-CM

## 2025-08-30 DIAGNOSIS — R41.89 COGNITIVE IMPAIRMENT: ICD-10-CM

## 2025-08-30 DIAGNOSIS — I10 BENIGN ESSENTIAL HYPERTENSION: ICD-10-CM

## 2025-08-30 DIAGNOSIS — S72.001D TRAUMATIC FRACTURE OF RIGHT HIP WITH ROUTINE HEALING: Primary | ICD-10-CM

## 2025-08-30 PROCEDURE — 99305 1ST NF CARE MODERATE MDM 35: CPT | Performed by: INTERNAL MEDICINE

## 2025-09-01 ENCOUNTER — PATIENT OUTREACH (OUTPATIENT)
Dept: CARE COORDINATION | Facility: CLINIC | Age: OVER 89
End: 2025-09-01
Payer: COMMERCIAL

## 2025-09-02 ENCOUNTER — DISCHARGE SUMMARY NURSING HOME (OUTPATIENT)
Dept: GERIATRICS | Facility: CLINIC | Age: OVER 89
End: 2025-09-02
Payer: COMMERCIAL

## 2025-09-02 VITALS
BODY MASS INDEX: 26.29 KG/M2 | HEIGHT: 64 IN | TEMPERATURE: 97.7 F | SYSTOLIC BLOOD PRESSURE: 126 MMHG | DIASTOLIC BLOOD PRESSURE: 78 MMHG | HEART RATE: 88 BPM | OXYGEN SATURATION: 94 % | WEIGHT: 154 LBS | RESPIRATION RATE: 16 BRPM

## 2025-09-02 DIAGNOSIS — D62 ANEMIA DUE TO BLOOD LOSS, ACUTE: ICD-10-CM

## 2025-09-02 DIAGNOSIS — G62.9 SMALL FIBER NEUROPATHY: ICD-10-CM

## 2025-09-02 DIAGNOSIS — I10 BENIGN ESSENTIAL HYPERTENSION: ICD-10-CM

## 2025-09-02 DIAGNOSIS — R41.89 COGNITIVE IMPAIRMENT: ICD-10-CM

## 2025-09-02 DIAGNOSIS — N39.0 URINARY TRACT INFECTION WITHOUT HEMATURIA, SITE UNSPECIFIED: ICD-10-CM

## 2025-09-02 DIAGNOSIS — S72.001D TRAUMATIC FRACTURE OF RIGHT HIP WITH ROUTINE HEALING: Primary | ICD-10-CM

## 2025-09-02 DIAGNOSIS — R33.9 URINARY RETENTION: ICD-10-CM

## 2025-09-02 DIAGNOSIS — R53.81 PHYSICAL DECONDITIONING: ICD-10-CM

## 2025-09-02 DIAGNOSIS — E11.43 TYPE 2 DIABETES MELLITUS WITH AUTONOMIC NEUROPATHY, UNSPECIFIED WHETHER LONG TERM INSULIN USE (H): ICD-10-CM

## 2025-09-02 DIAGNOSIS — K59.03 DRUG-INDUCED CONSTIPATION: ICD-10-CM

## 2025-09-02 PROCEDURE — 99316 NF DSCHRG MGMT 30 MIN+: CPT | Performed by: NURSE PRACTITIONER

## 2025-09-02 RX ORDER — PREGABALIN 75 MG/1
75 CAPSULE ORAL 2 TIMES DAILY
Qty: 40 CAPSULE | Refills: 0 | Status: SHIPPED | OUTPATIENT
Start: 2025-09-02

## 2025-12-03 ENCOUNTER — OFFICE VISIT (OUTPATIENT)
Dept: UROLOGY | Facility: CLINIC | Age: OVER 89
End: 2025-12-03
Attending: NURSE PRACTITIONER
Payer: COMMERCIAL

## 2025-12-03 DIAGNOSIS — R33.9 URINARY RETENTION: ICD-10-CM

## (undated) DEVICE — SOL NACL 0.9% IRRIG 1000ML BOTTLE 2F7124

## (undated) DEVICE — SU ETHIBOND 0 CT-1 CR 8X18" CX21D

## (undated) DEVICE — BAG CLEAR TRASH 1.3M 39X33" P4040C

## (undated) DEVICE — GLOVE PROTEXIS POWDER FREE 8.0 ORTHO LIME 2D73ET80

## (undated) DEVICE — PACK TOTAL HIP RIDGES LATEX PO15HIFSG

## (undated) DEVICE — BONE CLEANING TIP INTERPULSE FEMORAL CANAL 0210-007-000

## (undated) DEVICE — PAD FOAM MCGUIRE KIT 0814-0150

## (undated) DEVICE — PREP CHLORAPREP 26ML TINTED HI-LITE ORANGE 930815

## (undated) DEVICE — LINEN HALF SHEET 5512

## (undated) DEVICE — LINEN FULL SHEET 5511

## (undated) DEVICE — SUTURE VICRYL+ 2-0 36IN CT-1 UND VCP945H

## (undated) DEVICE — LINEN ORTHO ACL PACK 5447

## (undated) DEVICE — IMM PILLOW ABDUCT HIP MED M60-025-M

## (undated) DEVICE — DRAPE STERI TOWEL LG 1010

## (undated) DEVICE — SU DERMABOND PRINEO 22CM CLR222US

## (undated) DEVICE — BONE CLEANING TIP INTERPULSE  0210-010-000

## (undated) DEVICE — SUTURE VICRYL+ 0 CT-1 36IN VLT VCP346H

## (undated) DEVICE — SOLUTION IV IRRIGATION 0.9% NACL 3L R8206

## (undated) DEVICE — DRAPE CONVERTORS U-DRAPE 60X72" 8476

## (undated) DEVICE — DRSG GAUZE 4X4" TRAY

## (undated) DEVICE — SU ETHIBOND 2 V-37 4X30" MX69G

## (undated) DEVICE — BLADE SAW SAGITTAL STRK 25X90X1.27MM HD SYS 6 6125-127-090

## (undated) DEVICE — BONE CLEANING TIP INTERPULSE FEMORAL CANAL 0210-008-000

## (undated) DEVICE — DRSG ADAPTIC 3X8" 6113

## (undated) DEVICE — GLOVE PROTEXIS BLUE W/NEU-THERA 8.0  2D73EB80

## (undated) DEVICE — GLOVE PROTEXIS POWDER FREE 7.5 ORTHO LIME 2D73ET75

## (undated) DEVICE — LINEN DRAPE 54X72" 5467

## (undated) DEVICE — DRSG ABDOMINAL 07 1/2X8" 7197D

## (undated) DEVICE — ESU ELEC BLADE 6" COATED E1450-6

## (undated) DEVICE — SUCTION MANIFOLD NEPTUNE 2 SYS 4 PORT 0702-020-000

## (undated) DEVICE — DRSG AQUACEL AG HYDROFIBER  3.5X10" 422605

## (undated) DEVICE — SU STRATAFIX PDS PLUS 0 CT-1 18" SXPP1A401

## (undated) DEVICE — SU VICRYL+ 0 MO-4 8X18IN VIO VCP701D

## (undated) DEVICE — DRAPE IOBAN INCISE 23X17" 6650EZ

## (undated) RX ORDER — ONDANSETRON 2 MG/ML
INJECTION INTRAMUSCULAR; INTRAVENOUS
Status: DISPENSED
Start: 2025-08-06

## (undated) RX ORDER — GLYCOPYRROLATE 0.2 MG/ML
INJECTION, SOLUTION INTRAMUSCULAR; INTRAVENOUS
Status: DISPENSED
Start: 2025-08-06

## (undated) RX ORDER — LIDOCAINE HYDROCHLORIDE 10 MG/ML
INJECTION, SOLUTION EPIDURAL; INFILTRATION; INTRACAUDAL; PERINEURAL
Status: DISPENSED
Start: 2025-08-06

## (undated) RX ORDER — FENTANYL CITRATE 50 UG/ML
INJECTION, SOLUTION INTRAMUSCULAR; INTRAVENOUS
Status: DISPENSED
Start: 2025-08-06

## (undated) RX ORDER — FENTANYL CITRATE-0.9 % NACL/PF 10 MCG/ML
PLASTIC BAG, INJECTION (ML) INTRAVENOUS
Status: DISPENSED
Start: 2025-08-06

## (undated) RX ORDER — DEXAMETHASONE SODIUM PHOSPHATE 4 MG/ML
INJECTION, SOLUTION INTRA-ARTICULAR; INTRALESIONAL; INTRAMUSCULAR; INTRAVENOUS; SOFT TISSUE
Status: DISPENSED
Start: 2025-08-06

## (undated) RX ORDER — METHADONE HYDROCHLORIDE 10 MG/ML
INJECTION, SOLUTION INTRAMUSCULAR; INTRAVENOUS; SUBCUTANEOUS
Status: DISPENSED
Start: 2025-08-06

## (undated) RX ORDER — PROPOFOL 10 MG/ML
INJECTION, EMULSION INTRAVENOUS
Status: DISPENSED
Start: 2025-08-06

## (undated) RX ORDER — VANCOMYCIN HYDROCHLORIDE 1 G/20ML
INJECTION, POWDER, LYOPHILIZED, FOR SOLUTION INTRAVENOUS
Status: DISPENSED
Start: 2025-08-06

## (undated) RX ORDER — TRANEXAMIC ACID 10 MG/ML
INJECTION, SOLUTION INTRAVENOUS
Status: DISPENSED
Start: 2025-08-06

## (undated) RX ORDER — CEFAZOLIN SODIUM/WATER 2 G/20 ML
SYRINGE (ML) INTRAVENOUS
Status: DISPENSED
Start: 2025-08-06